# Patient Record
Sex: MALE | Race: WHITE | NOT HISPANIC OR LATINO | Employment: UNEMPLOYED | URBAN - METROPOLITAN AREA
[De-identification: names, ages, dates, MRNs, and addresses within clinical notes are randomized per-mention and may not be internally consistent; named-entity substitution may affect disease eponyms.]

---

## 2019-11-04 ENCOUNTER — ANCILLARY PROCEDURE (OUTPATIENT)
Dept: GENERAL RADIOLOGY | Facility: CLINIC | Age: 24
End: 2019-11-04
Attending: FAMILY MEDICINE
Payer: COMMERCIAL

## 2019-11-04 ENCOUNTER — OFFICE VISIT (OUTPATIENT)
Dept: ORTHOPEDICS | Facility: CLINIC | Age: 24
End: 2019-11-04
Payer: COMMERCIAL

## 2019-11-04 VITALS — BODY MASS INDEX: 33.33 KG/M2 | HEIGHT: 69 IN | WEIGHT: 225 LBS

## 2019-11-04 DIAGNOSIS — M79.645 THUMB PAIN, LEFT: Primary | ICD-10-CM

## 2019-11-04 DIAGNOSIS — M79.645 THUMB PAIN, LEFT: ICD-10-CM

## 2019-11-04 RX ORDER — METHYLPHENIDATE HYDROCHLORIDE 54 MG/1
72 TABLET ORAL
COMMUNITY
End: 2022-01-05

## 2019-11-04 ASSESSMENT — MIFFLIN-ST. JEOR: SCORE: 2005.97

## 2019-11-04 NOTE — PROGRESS NOTES
SPORTS & ORTHOPEDIC WALK-IN VISIT 11/4/2019    Primary Care Physician: Dr. Dunn      Here today for left MCP pain.  10 years ago he fell while snowboarding and feels like he dislocated the thumb at the MCP.  Does not recall any significant swelling or bruising at that time.  He did have some soreness for a few weeks afterwards but has had no problems since that time.  However a few weeks ago he felt a shifting in the MCP while leaning back on his left hand.  Since that time he has had some very mild soreness in the MCP of the left thumb.  However he is more bothered by the feeling of instability in the joint.  He does not feel like he can put full pressure on the thumb and has some apprehension with grasping.  He has not had any swelling or bruising.  He has not tried any bracing, home exercise or OTC medications.      Reason for visit:     What part of your body is injured / painful?  left hand    What caused the injury /pain? Unsure    How long ago did your injury occur or pain begin? several years ago    What are your most bothersome symptoms? Feels like it could dislocate again    How would you characterize your symptom?      What makes your symptoms better? Nothing    What makes your symptoms worse? Other: Gripping heavy objects    Have you been previously seen for this problem? No    Medical History:    Any recent changes to your medical history? No    Any new medication prescribed since last visit? No    Have you had surgery on this body part before? No    Social History:    Occupation: Student    Handedness: Right    Exercise: 4-5 days/week    Review of Systems:    Do you have fever, chills, weight loss? No    Do you have any vision problems? No    Do you have any chest pain or edema? No    Do you have any shortness of breath or wheezing?  No    Do you have stomach problems? No    Do you have any numbness or focal weakness? No    Do you have diabetes? No    Do you have problems with bleeding or  "clotting? No    Do you have an rashes or other skin lesions? No         Past Medical History, Current Medications, and Allergies are reviewed in the electronic medical record as appropriate.       EXAM:Ht 1.753 m (5' 9\")   Wt 102.1 kg (225 lb)   BMI 33.23 kg/m    General  - alert, pleasant, no distress  CV  - normal radial pulse, cap refill brisk  Musculoskeletal - left thumb  - inspection: no atrophy, normal joint alignment, no swelling  - palpation: no CMC tenderness, no soft tissue tenderness, no tenderness at the anatomical snuffbox  - ROM:  MCP 70 deg flexion   0 deg extension   IP 90 deg flexion   0 deg extension  - strength: 5/5  strength, 5/5 wrist abduction, 5/5 flexion, extension, adduction  - special tests:  Reports some discomfort with UCL stress but no laxity is noted.  Neuro  - no numbness, no motor deficit, grossly normal coordination, normal muscle tone  Skin  - no ecchymosis, erythema, warmth, or induration, no obvious rash      Imaging: X-rays of the left, performed and reviewed independently demonstrating no acute fracture dislocation.  No significant degenerative disease.  See EMR for formal radiology report.      Assessment: Patient is a 24 year old male with left thumb MCP sprain.  No significant structural injury suspected acutely, though possibly has some residual instability because of injury 10 years ago.  Nonetheless he has had relatively few symptoms for the last 10 years until recently.    Recommendations:   Reviewed imaging and assessment the patient detail  I recommended splinting from hand therapy as well as hand therapy visits if he desires.  Have given home exercises.  If he continues to have recurrent episodes of MCP pain or instability could consider MRI.    Sanchez Barry MD                "

## 2020-03-11 ENCOUNTER — TELEPHONE (OUTPATIENT)
Dept: INTERNAL MEDICINE | Facility: CLINIC | Age: 25
End: 2020-03-11

## 2020-03-11 DIAGNOSIS — Z00.00 ROUTINE HISTORY AND PHYSICAL EXAMINATION OF ADULT: Primary | ICD-10-CM

## 2020-03-11 NOTE — TELEPHONE ENCOUNTER
[facility] Call Center    Phone Message    May a detailed message be left on voicemail: yes     Reason for Call: Other:   Pt says that his whole family sees Too and pt does have an appt with Too in June but is wondering if he can come in to be seen sooner than that before he leaves the country for the next couple of years. Pt will be leaving sometime between May 10-20th.     Please call pt back if able to accomodate. Pt will continue to call in to check for cancellations.     Action Taken: Other:  pcc    Travel Screening: Not Applicable   Pt called and informed that due to the coronavirus Dr appointments are not being made. Encourage pt to call 1st part of April and May for updates.  Lucy Perera RN 11:36 AM on 3/17/2020.

## 2020-06-16 ENCOUNTER — OFFICE VISIT (OUTPATIENT)
Dept: INTERNAL MEDICINE | Facility: CLINIC | Age: 25
End: 2020-06-16
Payer: COMMERCIAL

## 2020-06-16 VITALS
HEART RATE: 70 BPM | SYSTOLIC BLOOD PRESSURE: 117 MMHG | WEIGHT: 243.1 LBS | DIASTOLIC BLOOD PRESSURE: 76 MMHG | BODY MASS INDEX: 35.9 KG/M2 | OXYGEN SATURATION: 98 %

## 2020-06-16 DIAGNOSIS — R23.9 SKIN CHANGE: Primary | ICD-10-CM

## 2020-06-16 DIAGNOSIS — Z11.3 SCREEN FOR STD (SEXUALLY TRANSMITTED DISEASE): ICD-10-CM

## 2020-06-16 DIAGNOSIS — R23.9 SKIN CHANGE: ICD-10-CM

## 2020-06-16 LAB
ALBUMIN SERPL-MCNC: 4.1 G/DL (ref 3.4–5)
ALP SERPL-CCNC: 81 U/L (ref 40–150)
ALT SERPL W P-5'-P-CCNC: 113 U/L (ref 0–70)
ANION GAP SERPL CALCULATED.3IONS-SCNC: 4 MMOL/L (ref 3–14)
AST SERPL W P-5'-P-CCNC: 43 U/L (ref 0–45)
BASOPHILS # BLD AUTO: 0.1 10E9/L (ref 0–0.2)
BASOPHILS NFR BLD AUTO: 0.8 %
BILIRUB SERPL-MCNC: 0.5 MG/DL (ref 0.2–1.3)
BUN SERPL-MCNC: 16 MG/DL (ref 7–30)
CALCIUM SERPL-MCNC: 8.8 MG/DL (ref 8.5–10.1)
CHLORIDE SERPL-SCNC: 110 MMOL/L (ref 94–109)
CHOLEST SERPL-MCNC: 190 MG/DL
CO2 SERPL-SCNC: 28 MMOL/L (ref 20–32)
CREAT SERPL-MCNC: 1.04 MG/DL (ref 0.66–1.25)
DIFFERENTIAL METHOD BLD: NORMAL
EOSINOPHIL # BLD AUTO: 0.4 10E9/L (ref 0–0.7)
EOSINOPHIL NFR BLD AUTO: 5.6 %
ERYTHROCYTE [DISTWIDTH] IN BLOOD BY AUTOMATED COUNT: 12.7 % (ref 10–15)
GFR SERPL CREATININE-BSD FRML MDRD: >90 ML/MIN/{1.73_M2}
GLUCOSE SERPL-MCNC: 99 MG/DL (ref 70–99)
HCT VFR BLD AUTO: 47.5 % (ref 40–53)
HDLC SERPL-MCNC: 52 MG/DL
HGB BLD-MCNC: 16 G/DL (ref 13.3–17.7)
IMM GRANULOCYTES # BLD: 0 10E9/L (ref 0–0.4)
IMM GRANULOCYTES NFR BLD: 0.6 %
LDLC SERPL CALC-MCNC: 125 MG/DL
LYMPHOCYTES # BLD AUTO: 2.8 10E9/L (ref 0.8–5.3)
LYMPHOCYTES NFR BLD AUTO: 43.2 %
MCH RBC QN AUTO: 29.5 PG (ref 26.5–33)
MCHC RBC AUTO-ENTMCNC: 33.7 G/DL (ref 31.5–36.5)
MCV RBC AUTO: 88 FL (ref 78–100)
MONOCYTES # BLD AUTO: 0.5 10E9/L (ref 0–1.3)
MONOCYTES NFR BLD AUTO: 7 %
NEUTROPHILS # BLD AUTO: 2.8 10E9/L (ref 1.6–8.3)
NEUTROPHILS NFR BLD AUTO: 42.8 %
NONHDLC SERPL-MCNC: 138 MG/DL
NRBC # BLD AUTO: 0 10*3/UL
NRBC BLD AUTO-RTO: 0 /100
PLATELET # BLD AUTO: 236 10E9/L (ref 150–450)
POTASSIUM SERPL-SCNC: 4.3 MMOL/L (ref 3.4–5.3)
PROT SERPL-MCNC: 7.1 G/DL (ref 6.8–8.8)
RBC # BLD AUTO: 5.43 10E12/L (ref 4.4–5.9)
SODIUM SERPL-SCNC: 142 MMOL/L (ref 133–144)
TRIGL SERPL-MCNC: 62 MG/DL
WBC # BLD AUTO: 6.6 10E9/L (ref 4–11)

## 2020-06-16 RX ORDER — ALBUTEROL SULFATE 90 UG/1
AEROSOL, METERED RESPIRATORY (INHALATION)
COMMUNITY
Start: 2020-03-17 | End: 2020-08-03

## 2020-06-16 RX ORDER — ALBUTEROL SULFATE 90 UG/1
1-2 AEROSOL, METERED RESPIRATORY (INHALATION)
COMMUNITY
Start: 2018-10-05 | End: 2022-10-04

## 2020-06-16 RX ORDER — DEXAMETHASONE 4 MG/1
TABLET ORAL
COMMUNITY
Start: 2020-03-17 | End: 2022-10-04

## 2020-06-16 ASSESSMENT — PAIN SCALES - GENERAL: PAINLEVEL: NO PAIN (0)

## 2020-06-16 NOTE — LETTER
Patient:  Miguel Angel Hernandez  :   1995  MRN:     [id number]        Mr. Miguel Angel Hernandez  [address] [address]        2020    Dear Mr. Hernandez,    Thank you for choosing the [facility] for your healthcare needs.  We appreciate the opportunity to serve you.    The following are your recent test results.     Dear Miguel Angel;     This testing is negative     GENE Gage     Resulted Orders   N. gonorrhea PCR - Urine, Clinic Collect   Result Value Ref Range    Specimen Descrip Urine     N Gonorrhea PCR Negative NEG^Negative      Comment:      Negative for N. gonorrhoeae rRNA by transcription mediated amplification.  A negative result by transcription mediated amplification does not preclude   the presence of N. gonorrhoeae infection because results are dependent on   proper and adequate collection, absence of inhibitors, and sufficient rRNA to   be detected.         ***    Please contact your provider if you have any questions or concerns.  We look forward to serving your needs in the future.      Sincerely,        Primary Care Center Staff

## 2020-06-16 NOTE — NURSING NOTE
Chief Complaint   Patient presents with     Establish Care     Pt would like to establish care today      JUAN Rizo at 11:09 AM sign on 6/16/2020

## 2020-06-16 NOTE — PROGRESS NOTES
HPI:    Pt. Comes into establish care. He does not abuse EtOH. He does not use tobacco but smokes marijuana. Family h/o for HTN. He may have had slightly elevated glucose in the past. He has some skin lesions on his waist line for several months. He tries to exercise but has gained some wt. Recently. He has been on Concerta for many years and sees an outside provider for this. He uses PRN inhalers but does not have any recent acute pulmonary sxs. He states he is probably up to date on immunizations but does not have information in front of him.  Otherwise, no other HEENT, cardiopulmonary, abdominal, , neurological, systemic, psychiatric, lymphatic, vascular endocrine complaints. No hernias or testicular masses.     PE:    Vitals noted, gen, nad, cooperative alert, HEENT, oropharynx clear no exudate, he came in wearing a mask, neck supple, nl rom, lungs with good air  Movement, RRR, S1, S2, no MRG, abdomen, no acute findings. He has follicular like skin findings on his waist line more on L than R. Grossly normal neurological exam    A/P:    1. ADHD; he remain on Concerta and followed by another provider  2. No real sxs. Of RAD but if worse could consider formal PFT's and will continue to renew MDI's  3. Will check fasting glucose and lipids  4. He would like to be screened for STD's but considers his risk low  5. Referred to dermatology for skin examination  6. He will check to see his immunization record. If he needs update he can make a nurse visit    Total time spent 30  minutes.  More than 50% of the time spent with Mr. Hernandez on counseling / coordinating his care

## 2020-06-16 NOTE — LETTER
6/16/2020      RE: Miguel Angel Hernandez  [address] [address]       HPI:    Pt. Comes into establish care. He does not abuse EtOH. He does not use tobacco but smokes marijuana. Family h/o for HTN. He may have had slightly elevated glucose in the past. He has some skin lesions on his waist line for several months. He tries to exercise but has gained some wt. Recently. He has been on Concerta for many years and sees an outside provider for this. He uses PRN inhalers but does not have any recent acute pulmonary sxs. He states he is probably up to date on immunizations but does not have information in front of him.  Otherwise, no other HEENT, cardiopulmonary, abdominal, , neurological, systemic, psychiatric, lymphatic, vascular endocrine complaints. No hernias or testicular masses.     PE:    Vitals noted, gen, nad, cooperative alert, HEENT, oropharynx clear no exudate, he came in wearing a mask, neck supple, nl rom, lungs with good air  Movement, RRR, S1, S2, no MRG, abdomen, no acute findings. He has follicular like skin findings on his waist line more on L than R. Grossly normal neurological exam    A/P:    1. ADHD; he remain on Concerta and followed by another provider  2. No real sxs. Of RAD but if worse could consider formal PFT's and will continue to renew MDI's  3. Will check fasting glucose and lipids  4. He would like to be screened for STD's but considers his risk low  5. Referred to dermatology for skin examination  6. He will check to see his immunization record. If he needs update he can make a nurse visit    Total time spent 30  minutes.  More than 50% of the time spent with Mr. Hernandez on counseling / coordinating his care          Layo Gage MD

## 2020-06-17 LAB
C TRACH DNA SPEC QL NAA+PROBE: NEGATIVE
HCV AB SERPL QL IA: NONREACTIVE
HIV 1+2 AB+HIV1 P24 AG SERPL QL IA: NONREACTIVE
N GONORRHOEA DNA SPEC QL NAA+PROBE: NEGATIVE
SPECIMEN SOURCE: NORMAL
SPECIMEN SOURCE: NORMAL

## 2020-06-18 ENCOUNTER — TELEPHONE (OUTPATIENT)
Dept: INTERNAL MEDICINE | Facility: CLINIC | Age: 25
End: 2020-06-18

## 2020-06-18 DIAGNOSIS — K76.9 DISORDER OF LIVER: Primary | ICD-10-CM

## 2020-06-18 NOTE — TELEPHONE ENCOUNTER
"Placed future lab orders this encounter      Dear Miguel Angel;    Your laboratory tests are mostly normal. One of the \"liver tests\" is slightly elevated and I recommend we just repeat the test in about one month. I placed a future laboratory order for this and you can call [phone] to schedule this appointment.    GENE Gage MD    "

## 2020-06-26 ENCOUNTER — VIRTUAL VISIT (OUTPATIENT)
Dept: INTERNAL MEDICINE | Facility: CLINIC | Age: 25
End: 2020-06-26
Payer: COMMERCIAL

## 2020-06-26 DIAGNOSIS — N52.8 OTHER MALE ERECTILE DYSFUNCTION: Primary | ICD-10-CM

## 2020-06-26 NOTE — NURSING NOTE
Chief Complaint   Patient presents with     Recheck Medication     discuss medications. Concerta Kimberly Nissen, EMT at 12:34 PM on 6/26/2020

## 2020-06-26 NOTE — PROGRESS NOTES
Telephone visit    Mr. Hernandez agrees to a telephone visit. Last in person visit with me was 6/16/2020. He had laboratory testing on that day and slight elevation in ALT (113); no other abnormalities. Will repeat liver tests and if abnormal further lab and imaging testing. He is not abusing EtOH or other drugs. He has some more recent erectile dysfunction. He has been on methylphenidate for about 12 years. Will review medication with [facility] pharmacy and have him evaluated by Dr. De Oliveira, Urology    GENE Gage MD    Total time more than 5 mintues

## 2020-06-27 ENCOUNTER — TELEPHONE (OUTPATIENT)
Dept: INTERNAL MEDICINE | Facility: CLINIC | Age: 25
End: 2020-06-27

## 2020-06-27 DIAGNOSIS — N52.8 OTHER MALE ERECTILE DYSFUNCTION: Primary | ICD-10-CM

## 2020-06-27 NOTE — TELEPHONE ENCOUNTER
Dear Lucy;    I had telephone visit with Mr. Hernandez yesterday and he needs Urology referral to Dr. De Oliveira. Order placed this encounter    ThanksGENE    Referral to Urology for scheduling.  Lucy Perera RN 11:28 AM on 6/29/2020.

## 2020-06-29 ENCOUNTER — TELEPHONE (OUTPATIENT)
Dept: INTERNAL MEDICINE | Facility: CLINIC | Age: 25
End: 2020-06-29

## 2020-06-30 ENCOUNTER — NURSE TRIAGE (OUTPATIENT)
Dept: NURSING | Facility: CLINIC | Age: 25
End: 2020-06-30

## 2020-06-30 ENCOUNTER — TELEPHONE (OUTPATIENT)
Dept: INTERNAL MEDICINE | Facility: CLINIC | Age: 25
End: 2020-06-30

## 2020-06-30 NOTE — TELEPHONE ENCOUNTER
"Patient has been trying to leave a voice mail for Dr. Layo Gage.  Offered to leave a routed message for provider to call patient back, but patient declined and said he would \"try a different number.\"    Routed to [facility] primary care [facility]    Jennifer Ramirez RN  [facility]      "

## 2020-06-30 NOTE — TELEPHONE ENCOUNTER
"Patient has been trying to leave a voice mail for Dr. Layo Gage.  Offered to leave a routed message for provider to call patient back, but patient declined and said he would \"try a different number.\"    Routed to [facility] primary care [facility]    Jennifer Ramirez RN  [facility]        "

## 2020-07-07 NOTE — TELEPHONE ENCOUNTER
MEDICAL RECORDS REQUEST   [facility]  Urology Clinic  [address] [place] [address]  PHONE: [phone]  Fax: [phone]        FUTURE VISIT INFORMATION                                                   Miguel Angel Hernandez, : 1995 scheduled for future visit at [facility]    APPOINTMENT INFORMATION:    Date: 20 1:15PM    Provider:  Celestino De Oliveira MD    Reason for Visit/Diagnosis: ED Issues     REFERRAL INFORMATION:    Referring provider:  LAUREN GUO    Specialty: MD    Referring providers clinic:  [facility]    Clinic contact number:  [phone]    RECORDS REQUESTED FOR VISIT                                                     NOTES  STATUS/DETAILS   OFFICE NOTE from referring provider  yes   OFFICE NOTE from other specialist  no   DISCHARGE SUMMARY from hospital  no   DISCHARGE REPORT from the ER  no   OPERATIVE REPORT  no   MEDICATION LIST  no     PRE-VISIT CHECKLIST      Record collection complete Yes   Appointment appropriately scheduled           (right time/right provider) Yes   MyChart activation Yes   Questionnaire complete If no, please explain: In process      Completed by: Taylor Savage

## 2020-07-08 ENCOUNTER — PRE VISIT (OUTPATIENT)
Dept: UROLOGY | Facility: CLINIC | Age: 25
End: 2020-07-08

## 2020-07-08 NOTE — TELEPHONE ENCOUNTER
Reason for Visit: Consult    Diagnosis: Erectile Dysfunction    Orders/Procedures/Records: in system    Contact Patient: n/a    Rooming Requirements: Dank Cage LPN  07/08/20  1:18 PM

## 2020-07-16 ENCOUNTER — VIRTUAL VISIT (OUTPATIENT)
Dept: UROLOGY | Facility: CLINIC | Age: 25
End: 2020-07-16
Payer: COMMERCIAL

## 2020-07-16 ENCOUNTER — PRE VISIT (OUTPATIENT)
Dept: UROLOGY | Facility: CLINIC | Age: 25
End: 2020-07-16

## 2020-07-16 DIAGNOSIS — N52.9 ERECTILE DYSFUNCTION, UNSPECIFIED ERECTILE DYSFUNCTION TYPE: Primary | ICD-10-CM

## 2020-07-16 RX ORDER — SILDENAFIL CITRATE 20 MG/1
20-40 TABLET ORAL DAILY PRN
Qty: 30 TABLET | Refills: 12 | Status: SHIPPED | OUTPATIENT
Start: 2020-07-16 | End: 2022-06-20

## 2020-07-16 NOTE — NURSING NOTE
"Miguel Angel Hernandez has given verbal permission for a phone visit 07/16/20 1:08 PM and would like to be reached at # [phone]     Called the pt 07/16/20, and 1:08 PM and reviewed their medications, history, and allergies. I then asked that they be in a quiet location with minimal distractions so they can hear the provider well.    Chief Complaint   Patient presents with     Consult For     ED       There were no vitals taken for this visit. There is no height or weight on file to calculate BMI.    There is no problem list on file for this patient.      Allergies   Allergen Reactions     Seasonal Allergies        Current Outpatient Medications   Medication Sig Dispense Refill     albuterol (PROAIR HFA/PROVENTIL HFA/VENTOLIN HFA) 108 (90 Base) MCG/ACT inhaler Inhale 1-2 puffs into the lungs       FLOVENT  MCG/ACT inhaler INHALE 2 PUFFS TWICE A DAY FOR TWO WEEKS AT ONSET OF URI. RINSE AFTER USE.       methylphenidate (CONCERTA) 54 MG CR tablet Take 72 mg by mouth       VENTOLIN  (90 Base) MCG/ACT inhaler INL 1 TO 2 PFS VIA SPACER Q 4 TO 6 H PRN         Social History     Tobacco Use     Smoking status: Current Some Day Smoker     Types: Other     Smokeless tobacco: Never Used     Tobacco comment: \"recreational smoker--marijuana\"    Substance Use Topics     Alcohol use: Not on file     Drug use: Not on file       Vaslie Lloyd, EMT,  7/16/2020  1:08 PM      "

## 2020-07-16 NOTE — LETTER
"7/16/2020       RE: Miguel Angel Hernandez  [address] [address]     Dear Colleague,    Thank you for referring your patient, Miguel Angel Hernandez, to the [facility] UROLOGY AND INST FOR PROSTATE AND UROLOGIC CANCERS at [facility]. Please see a copy of my visit note below.    Miguel Angel Hernandez is a 24 year old male who is being evaluated via a billable telephone visit.      The patient has been notified of following:     \"This telephone visit will be conducted via a call between you and your physician/provider. We have found that certain health care needs can be provided without the need for a physical exam.  This service lets us provide the care you need with a short phone conversation.  If a prescription is necessary we can send it directly to your pharmacy.  If lab work is needed we can place an order for that and you can then stop by our lab to have the test done at a later time.    Telephone visits are billed at different rates depending on your insurance coverage. During this emergency period, for some insurers they may be billed the same as an in-person visit.  Please reach out to your insurance provider with any questions.    If during the course of the call the physician/provider feels a telephone visit is not appropriate, you will not be charged for this service.\"    Patient has given verbal consent for Telephone visit?  Yes    What phone number would you like to be contacted at? [phone]    How would you like to obtain your AVS? Melania      I am seeing Miguel Angel Hernandez in consultation from Dr. Gage for evaluation of erectile dysfunction.    HPI:  Miguel Angel Hernandez is a 24 year old male with history of ED.  He's noticed issues maintaining erections with intercourse.  This has been last year or so. He's wondering if this is side effect from ADHD medications.  ED is distressing for him.      ED/Vascular disease risk factors:  HTN:   No  Hyperlipidemia: " "no  Smoking: no   DM: no  Cardiovascular disease: None  known  Meds associated with ED that he's taking: maybe ADHD medication.  Anxiety/anger/depression:  No    Occasionally marijuana- advised he abstain.      REVIEW OF SYSTEMS:  General: negative  Skin: negative  Eyes: negative  Ears/Nose/Throat: negative  Respiratory: negative  Cardiovascular: negative  Gastrointestinal: negative  Genitourinary: see HPI  Musculoskeletal: negative  Neurologic: negative  Psychiatric: negative  Hematologic/Lymphatic/Immunologic: negative  Endocrine: negative    PAST MEDICAL HX:  ADHD    PAST SURG HX:  No history of surgery, other than tonsils out.    FAMILY HX:  DM  HTN      SOCIAL HX:  Social History     Tobacco Use     Smoking status: Former Smoker     Types: Other     Smokeless tobacco: Never Used     Tobacco comment: \"recreational smoker--marijuana\"    Substance Use Topics     Alcohol use: None     Drug use: None       MEDICATIONS:  Current Outpatient Medications   Medication Sig     albuterol (PROAIR HFA/PROVENTIL HFA/VENTOLIN HFA) 108 (90 Base) MCG/ACT inhaler Inhale 1-2 puffs into the lungs     FLOVENT  MCG/ACT inhaler INHALE 2 PUFFS TWICE A DAY FOR TWO WEEKS AT ONSET OF URI. RINSE AFTER USE.     methylphenidate (CONCERTA) 54 MG CR tablet Take 72 mg by mouth     VENTOLIN  (90 Base) MCG/ACT inhaler INL 1 TO 2 PFS VIA SPACER Q 4 TO 6 H PRN     No current facility-administered medications for this visit.        ALLERGIES:  Seasonal allergies      GENERAL PHYSICAL EXAM:     Exam deferred over the phone.  Attitude and demeanor is pleasant, alert & oriented and conversant.  No gross thought process defects during casual conversation.     Imaging/labs:  Lab Results   Component Value Date    CR 1.04 06/16/2020       ASSESSMENT:     Trouble to maintain erections.    PLAN:    Sildenafil prescription, 20mg dose.  Discussed side effects and how to take.    Future labs for testosterone, E2, prolactin., he has an upcomming " blood draw and will add on to that draw.    PRN follow-up with urology. I'll contact him on MyChart with lab results.    Celestino De Oliveira MD     Urological Surgeon      16min phone call, ending 1:40 PM

## 2020-07-16 NOTE — PROGRESS NOTES
"Miguel Angel Hernandez is a 24 year old male who is being evaluated via a billable telephone visit.      The patient has been notified of following:     \"This telephone visit will be conducted via a call between you and your physician/provider. We have found that certain health care needs can be provided without the need for a physical exam.  This service lets us provide the care you need with a short phone conversation.  If a prescription is necessary we can send it directly to your pharmacy.  If lab work is needed we can place an order for that and you can then stop by our lab to have the test done at a later time.    Telephone visits are billed at different rates depending on your insurance coverage. During this emergency period, for some insurers they may be billed the same as an in-person visit.  Please reach out to your insurance provider with any questions.    If during the course of the call the physician/provider feels a telephone visit is not appropriate, you will not be charged for this service.\"    Patient has given verbal consent for Telephone visit?  Yes    What phone number would you like to be contacted at? [phone]    How would you like to obtain your AVS? Melania      I am seeing Miguel Angel Hernandez in consultation from Dr. Gage for evaluation of erectile dysfunction.    HPI:  Miguel Angel Hernandez is a 24 year old male with history of ED.  He's noticed issues maintaining erections with intercourse.  This has been last year or so. He's wondering if this is side effect from ADHD medications.  ED is distressing for him.      ED/Vascular disease risk factors:  HTN:   No  Hyperlipidemia: no  Smoking: no   DM: no  Cardiovascular disease: None  known  Meds associated with ED that he's taking: maybe ADHD medication.  Anxiety/anger/depression:  No    Occasionally marijuana- advised he abstain.      REVIEW OF SYSTEMS:  General: negative  Skin: negative  Eyes: negative  Ears/Nose/Throat: negative  Respiratory: " "negative  Cardiovascular: negative  Gastrointestinal: negative  Genitourinary: see HPI  Musculoskeletal: negative  Neurologic: negative  Psychiatric: negative  Hematologic/Lymphatic/Immunologic: negative  Endocrine: negative    PAST MEDICAL HX:  ADHD    PAST SURG HX:  No history of surgery, other than tonsils out.    FAMILY HX:  DM  HTN      SOCIAL HX:  Social History     Tobacco Use     Smoking status: Former Smoker     Types: Other     Smokeless tobacco: Never Used     Tobacco comment: \"recreational smoker--marijuana\"    Substance Use Topics     Alcohol use: None     Drug use: None       MEDICATIONS:  Current Outpatient Medications   Medication Sig     albuterol (PROAIR HFA/PROVENTIL HFA/VENTOLIN HFA) 108 (90 Base) MCG/ACT inhaler Inhale 1-2 puffs into the lungs     FLOVENT  MCG/ACT inhaler INHALE 2 PUFFS TWICE A DAY FOR TWO WEEKS AT ONSET OF URI. RINSE AFTER USE.     methylphenidate (CONCERTA) 54 MG CR tablet Take 72 mg by mouth     VENTOLIN  (90 Base) MCG/ACT inhaler INL 1 TO 2 PFS VIA SPACER Q 4 TO 6 H PRN     No current facility-administered medications for this visit.        ALLERGIES:  Seasonal allergies      GENERAL PHYSICAL EXAM:     Exam deferred over the phone.  Attitude and demeanor is pleasant, alert & oriented and conversant.  No gross thought process defects during casual conversation.     Imaging/labs:  Lab Results   Component Value Date    CR 1.04 06/16/2020       ASSESSMENT:     Trouble to maintain erections.    PLAN:    Sildenafil prescription, 20mg dose.  Discussed side effects and how to take.    Future labs for testosterone, E2, prolactin., he has an upcomming blood draw and will add on to that draw.    PRN follow-up with urology. I'll contact him on MyChart with lab results.      Celestino De Oliveira MD     Urological Surgeon      16min phone call, ending 1:40 PM   "

## 2020-07-16 NOTE — PATIENT INSTRUCTIONS
Prescription has been sent to your pharmacy.    Schedule lab appointment.  Dr. De Oliveira will notify you of the results.    It was a pleasure meeting with you today.  Thank you for allowing me and my team the privilege of caring for you today.  YOU are the reason we are here, and I truly hope we provided you with the excellent service you deserve.  Please let us know if there is anything else we can do for you so that we can be sure you are leaving completely satisfied with your care experience.        Laura Washington, CMA

## 2020-07-21 DIAGNOSIS — K76.9 DISORDER OF LIVER: ICD-10-CM

## 2020-07-21 DIAGNOSIS — N52.9 ERECTILE DYSFUNCTION, UNSPECIFIED ERECTILE DYSFUNCTION TYPE: ICD-10-CM

## 2020-07-21 LAB
ALBUMIN SERPL-MCNC: 4.1 G/DL (ref 3.4–5)
ALP SERPL-CCNC: 84 U/L (ref 40–150)
ALT SERPL W P-5'-P-CCNC: 160 U/L (ref 0–70)
AST SERPL W P-5'-P-CCNC: 88 U/L (ref 0–45)
BILIRUB DIRECT SERPL-MCNC: 0.1 MG/DL (ref 0–0.2)
BILIRUB SERPL-MCNC: 0.4 MG/DL (ref 0.2–1.3)
PROLACTIN SERPL-MCNC: 7 UG/L (ref 2–18)
PROT SERPL-MCNC: 7.4 G/DL (ref 6.8–8.8)

## 2020-07-22 ENCOUNTER — TELEPHONE (OUTPATIENT)
Dept: UROLOGY | Facility: CLINIC | Age: 25
End: 2020-07-22

## 2020-07-23 ENCOUNTER — TELEPHONE (OUTPATIENT)
Dept: INTERNAL MEDICINE | Facility: CLINIC | Age: 25
End: 2020-07-23

## 2020-07-23 DIAGNOSIS — R74.8 ELEVATED LIVER ENZYMES: Primary | ICD-10-CM

## 2020-07-23 LAB
SHBG SERPL-SCNC: 36 NMOL/L (ref 11–80)
TESTOST FREE SERPL-MCNC: 11.96 NG/DL (ref 4.7–24.4)
TESTOST SERPL-MCNC: 585 NG/DL (ref 240–950)

## 2020-07-23 NOTE — TELEPHONE ENCOUNTER
Dear Lucy;    Please see results note I sent to Miguel Angel and help coordinate future labs, abdominal U/S, and hepatic GI appt. (all ordered this encounter)    Thanks, GENE Gage    Dear Miguel Angel;    Your liver tests are still abnormal and I recommend we perform some additional testing. I placed orders today for laboratory tests, an liver ultrasound, and a visit with the hepatologist.  My nurse Lucy will help coordinate this.    GENE Gage MD    Pt called and plan of care discussed. Clinic coordinators to assist in scheduling appts. Clinic numbers given for questions or concerns.  Lucy Perera RN 12:40 PM on 7/23/2020.

## 2020-07-27 DIAGNOSIS — R74.8 ELEVATED LIVER ENZYMES: ICD-10-CM

## 2020-07-27 LAB
HBV CORE AB SERPL QL IA: NONREACTIVE
HBV SURFACE AB SERPL IA-ACNC: 0.51 M[IU]/ML
HBV SURFACE AG SERPL QL IA: NONREACTIVE

## 2020-07-27 NOTE — TELEPHONE ENCOUNTER
RECORDS RECEIVED FROM: Elevated liver enzymes [R74.8]   Appt Date: 08.03.2020   NOTES STATUS DETAILS   OFFICE NOTE from referring provider Internal 07.23.2020 Consult Layo Gage MD [facility]   OFFICE NOTES from other specialists N/A    DISCHARGE SUMMARY from hospital N/A    MEDICATION LIST Internal    LIVER BIOSPY (IF APPLICABLE)      PATHOLOGY REPORTS  N/A    IMAGING     ENDOSCOPY (IF AVAILABLE) N/A    COLONOSCOPY (IF AVAILABLE) N/A    ULTRASOUND LIVER Scheduled  08.01.2020 US Abdomen Complete      CT OF ABDOMEN N/A    MRI OF LIVER N/A    FIBROSCAN, US ELASTOGRAPHY, FIBROSIS SCAN, MR ELASTOGRAPHY N/A    LABS     HEPATIC PANEL (LIVER PANEL) Internal 07.21.2020   BASIC METABOLIC PANEL N/A    COMPLETE METABOLIC PANEL Internal 06.16.2020   COMPLETE BLOOD COUNT (CBC) Internal 065.16.2020   INTERNATIONAL NORMALIZED RATIO (INR) N/A    HEPATITIS C ANTIBODY N/A    HEPATITIS C VIRAL LOAD/PCR N/A    HEPATITIS C GENOTYPE N/A    HEPATITIS B SURFACE ANTIGEN In process 07.27.2020   HEPATITIS B SURFACE ANTIBODY In process 07.27.2020   HEPATITIS B DNA QUANT LEVEL N/A    HEPATITIS B CORE ANTIBODY In process 07.27.2020

## 2020-07-28 LAB
ANA SER QL IF: NEGATIVE
ESTRADIOL SERPL HS-MCNC: 27 PG/ML (ref 10–40)
SMA IGG SER-ACNC: 3 UNITS (ref 0–19)

## 2020-08-01 ENCOUNTER — ANCILLARY PROCEDURE (OUTPATIENT)
Dept: ULTRASOUND IMAGING | Facility: CLINIC | Age: 25
End: 2020-08-01
Attending: INTERNAL MEDICINE
Payer: COMMERCIAL

## 2020-08-01 DIAGNOSIS — R74.8 ELEVATED LIVER ENZYMES: ICD-10-CM

## 2020-08-03 ENCOUNTER — VIRTUAL VISIT (OUTPATIENT)
Dept: GASTROENTEROLOGY | Facility: CLINIC | Age: 25
End: 2020-08-03
Attending: INTERNAL MEDICINE
Payer: COMMERCIAL

## 2020-08-03 ENCOUNTER — PRE VISIT (OUTPATIENT)
Dept: GASTROENTEROLOGY | Facility: CLINIC | Age: 25
End: 2020-08-03

## 2020-08-03 DIAGNOSIS — R74.8 ELEVATED LIVER ENZYMES: ICD-10-CM

## 2020-08-03 ASSESSMENT — PAIN SCALES - GENERAL: PAINLEVEL: NO PAIN (0)

## 2020-08-03 NOTE — PATIENT INSTRUCTIONS
Plan  1.  Additional blood work- this can be done at your next visit  2.  Follow-up on liver ultrasound report- I will send you a [employer] message  3.  Weight loss with diet and exercise  4.  Follow-up in 6 months    Lisandro Cotto MD  Hepatology  [facility]

## 2020-08-03 NOTE — PROGRESS NOTES
Left voicemail for patient to call back to set up telemedicine visit, will call again before appointment time.  Cindy Rogers CMA on 8/3/2020 at 8:18 AM

## 2020-08-03 NOTE — LETTER
"    8/3/2020         RE: Miguel Angel Hernandez  [address] [address]        Dear Colleague,    Thank you for referring your patient, Miguel Angel Hernandez, to the [facility] HEPATOLOGY. Please see a copy of my visit note below.    Left voicemail for patient to call back to set up telemedicine visit, will call again before appointment time.  Cindy Rogers CMA on 8/3/2020 at 8:18 AM      Left voicemail for patient to call back to set up telemedicine visit, will call again before appointment time.  Cindy Rogers CMA on 8/3/2020 at 8:38 AM      Miguel Angel Hernandez is a 24 year old male who is being evaluated via a billable telephone visit.      The patient has been notified of following:     \"This telephone visit will be conducted via a call between you and your physician/provider. We have found that certain health care needs can be provided without the need for a physical exam.  This service lets us provide the care you need with a short phone conversation.  If a prescription is necessary we can send it directly to your pharmacy.  If lab work is needed we can place an order for that and you can then stop by our lab to have the test done at a later time.    Telephone visits are billed at different rates depending on your insurance coverage. During this emergency period, for some insurers they may be billed the same as an in-person visit.  Please reach out to your insurance provider with any questions.    If during the course of the call the physician/provider feels a telephone visit is not appropriate, you will not be charged for this service.\"    Patient has given verbal consent for Telephone visit?  Yes    What phone number would you like to be contacted at? [phone]    How would you like to obtain your AVS? Mail a copy    Phone call duration: 25 minutes  _______________________________________________________________________________    [facility]    Hepatology New Patient " Visit    Referring provider:  Layo Gage      24 year old male    Chief complaint:  Abnormal liver function tests     HPI:  The patient presents for further evaluation and management of abnormal liver function tests.  The patient recently transitioned from pediatric to adult primary care when routine liver function tests were checked and noted to be abnormal.  Testing for hepatitis B, C and autoimmune hepatitis returned negative.  Abdominal ultrasound was performed on Friday with no report back yet.      The patient is well today.  He denies any symptoms specific to liver disease.      The patient denies any jaundice, itch, lower extremity edema, abdominal distention, lethargy or confusion.      The patient denies melena, hematemesis or hematochezia.      The patient denies any fevers, sweats or chills.  No cough or dyspnea.      The patient denies any recent weight gain.  He is overweight- patient weighs 235lbs and is 5 feet 8 inches in height.  Appetite is normal.      The patient drinks alcohol once to twice per week and will drink 2-4 beers on these occasions.  He denies any history of alcohol abuse or DUIs.      The patient has never smoked.  He currently uses marijuana 2-3 times per week.  No history of intranasal or intravenous drug use.       Medical hx Surgical hx   Past Medical History:   Diagnosis Date     ADHD      Asthma      Dyslipidemia      Obesity       Past Surgical History:   Procedure Laterality Date     TONSILLECTOMY            Medications  Prior to Admission medications    Medication Sig Start Date End Date Taking? Authorizing Provider   albuterol (PROAIR HFA/PROVENTIL HFA/VENTOLIN HFA) 108 (90 Base) MCG/ACT inhaler Inhale 1-2 puffs into the lungs 10/5/18  Yes Reported, Patient   FLOVENT  MCG/ACT inhaler INHALE 2 PUFFS TWICE A DAY FOR TWO WEEKS AT ONSET OF URI. RINSE AFTER USE. 3/17/20  Yes Reported, Patient   methylphenidate (CONCERTA) 54 MG CR tablet Take 72 mg by mouth   Yes  "Reported, Patient   sildenafil (REVATIO) 20 MG tablet Take 1-2 tablets (20-40 mg) by mouth daily as needed (take 1 hour before intercourse as needed.) May increase up to 100mg dose ( 5 tablets) if needed.  Use lowest effective dose.  Patient not taking: Reported on 8/3/2020 7/16/20   Celestino De Oliveira MD       Allergies  Allergies   Allergen Reactions     Seasonal Allergies        Family hx Social hx   Family History   Problem Relation Age of Onset     Diabetes Maternal Grandmother      Liver Disease No family hx of      Colon Cancer No family hx of       Social History     Tobacco Use     Smoking status: Former Smoker     Types: Other     Smokeless tobacco: Never Used     Tobacco comment: \"recreational smoker--marijuana\"    Substance Use Topics     Alcohol use: None     Drug use: None     Lives in [place] with parents.  2 younger brothers, both healthy.  No children.  Works as analyst at hep desk.       Review of systems  A 10-point review of systems was negative.    Examination  N/A    Laboratory  Lab Results   Component Value Date     06/16/2020    POTASSIUM 4.3 06/16/2020    CHLORIDE 110 06/16/2020    CO2 28 06/16/2020    BUN 16 06/16/2020    CR 1.04 06/16/2020       Lab Results   Component Value Date    BILITOTAL 0.4 07/21/2020     07/21/2020    AST 88 07/21/2020    ALKPHOS 84 07/21/2020       Lab Results   Component Value Date    ALBUMIN 4.1 07/21/2020    PROTTOTAL 7.4 07/21/2020        Lab Results   Component Value Date    WBC 6.6 06/16/2020    HGB 16.0 06/16/2020    MCV 88 06/16/2020     06/16/2020       Radiology  Abd U/S 7/31/2020 (P)    Assessment  24-year-old male with a history of metabolic syndrome who presents for further evaluation and management of abnormal liver function tests most likely secondary to non-alcoholic fatty liver disease.  No biochemical evidence of cirrhosis.  Will rule out other etiologies of chronic liver disease including celiac disease and " hemochromatosis.  We discussed the natural history, complications and management of non-alcoholic fatty liver disease, which is primarily through weight loss via diet and exercise.     Plan  1.  Check TTG Ab, iron studies, HAV Ab  2.  Follow-up on pending abd U/S  3.  Weight loss with diet and exercise  4.  Follow-up in 6 months    Lisandro Cotto MD  Hepatology  [facility]

## 2020-08-03 NOTE — PROGRESS NOTES
"Miguel Angel Hernandez is a 24 year old male who is being evaluated via a billable telephone visit.      The patient has been notified of following:     \"This telephone visit will be conducted via a call between you and your physician/provider. We have found that certain health care needs can be provided without the need for a physical exam.  This service lets us provide the care you need with a short phone conversation.  If a prescription is necessary we can send it directly to your pharmacy.  If lab work is needed we can place an order for that and you can then stop by our lab to have the test done at a later time.    Telephone visits are billed at different rates depending on your insurance coverage. During this emergency period, for some insurers they may be billed the same as an in-person visit.  Please reach out to your insurance provider with any questions.    If during the course of the call the physician/provider feels a telephone visit is not appropriate, you will not be charged for this service.\"    Patient has given verbal consent for Telephone visit?  Yes    What phone number would you like to be contacted at? [phone]    How would you like to obtain your AVS? Mail a copy    Phone call duration: 25 minutes  _______________________________________________________________________________    [facility]    Hepatology New Patient Visit    Referring provider:  Layo Gage      24 year old male    Chief complaint:  Abnormal liver function tests     HPI:  The patient presents for further evaluation and management of abnormal liver function tests.  The patient recently transitioned from pediatric to adult primary care when routine liver function tests were checked and noted to be abnormal.  Testing for hepatitis B, C and autoimmune hepatitis returned negative.  Abdominal ultrasound was performed on Friday with no report back yet.      The patient is well today.  He denies any " symptoms specific to liver disease.      The patient denies any jaundice, itch, lower extremity edema, abdominal distention, lethargy or confusion.      The patient denies melena, hematemesis or hematochezia.      The patient denies any fevers, sweats or chills.  No cough or dyspnea.      The patient denies any recent weight gain.  He is overweight- patient weighs 235lbs and is 5 feet 8 inches in height.  Appetite is normal.      The patient drinks alcohol once to twice per week and will drink 2-4 beers on these occasions.  He denies any history of alcohol abuse or DUIs.      The patient has never smoked.  He currently uses marijuana 2-3 times per week.  No history of intranasal or intravenous drug use.       Medical hx Surgical hx   Past Medical History:   Diagnosis Date     ADHD      Asthma      Dyslipidemia      Obesity       Past Surgical History:   Procedure Laterality Date     TONSILLECTOMY            Medications  Prior to Admission medications    Medication Sig Start Date End Date Taking? Authorizing Provider   albuterol (PROAIR HFA/PROVENTIL HFA/VENTOLIN HFA) 108 (90 Base) MCG/ACT inhaler Inhale 1-2 puffs into the lungs 10/5/18  Yes Reported, Patient   FLOVENT  MCG/ACT inhaler INHALE 2 PUFFS TWICE A DAY FOR TWO WEEKS AT ONSET OF URI. RINSE AFTER USE. 3/17/20  Yes Reported, Patient   methylphenidate (CONCERTA) 54 MG CR tablet Take 72 mg by mouth   Yes Reported, Patient   sildenafil (REVATIO) 20 MG tablet Take 1-2 tablets (20-40 mg) by mouth daily as needed (take 1 hour before intercourse as needed.) May increase up to 100mg dose ( 5 tablets) if needed.  Use lowest effective dose.  Patient not taking: Reported on 8/3/2020 7/16/20   Celestino De Oliveira MD       Allergies  Allergies   Allergen Reactions     Seasonal Allergies        Family hx Social hx   Family History   Problem Relation Age of Onset     Diabetes Maternal Grandmother      Liver Disease No family hx of      Colon Cancer No family hx of   "     Social History     Tobacco Use     Smoking status: Former Smoker     Types: Other     Smokeless tobacco: Never Used     Tobacco comment: \"recreational smoker--marijuana\"    Substance Use Topics     Alcohol use: None     Drug use: None     Lives in [place] with parents.  2 younger brothers, both healthy.  No children.  Works as analyst at hep desk.       Review of systems  A 10-point review of systems was negative.    Examination  N/A    Laboratory  Lab Results   Component Value Date     06/16/2020    POTASSIUM 4.3 06/16/2020    CHLORIDE 110 06/16/2020    CO2 28 06/16/2020    BUN 16 06/16/2020    CR 1.04 06/16/2020       Lab Results   Component Value Date    BILITOTAL 0.4 07/21/2020     07/21/2020    AST 88 07/21/2020    ALKPHOS 84 07/21/2020       Lab Results   Component Value Date    ALBUMIN 4.1 07/21/2020    PROTTOTAL 7.4 07/21/2020        Lab Results   Component Value Date    WBC 6.6 06/16/2020    HGB 16.0 06/16/2020    MCV 88 06/16/2020     06/16/2020       Radiology  Abd U/S 7/31/2020 (P)    Assessment  24-year-old male with a history of metabolic syndrome who presents for further evaluation and management of abnormal liver function tests most likely secondary to non-alcoholic fatty liver disease.  No biochemical evidence of cirrhosis.  Will rule out other etiologies of chronic liver disease including celiac disease and hemochromatosis.  We discussed the natural history, complications and management of non-alcoholic fatty liver disease, which is primarily through weight loss via diet and exercise.     Plan  1.  Check TTG Ab, iron studies, HAV Ab  2.  Follow-up on pending abd U/S  3.  Weight loss with diet and exercise  4.  Follow-up in 6 months    Lisandro Cotto MD  Hepatology  [facility]      "

## 2020-08-03 NOTE — PROGRESS NOTES
Left voicemail for patient to call back to set up telemedicine visit, will call again before appointment time.  Cindy Rogers CMA on 8/3/2020 at 8:38 AM

## 2020-10-29 ENCOUNTER — OFFICE VISIT (OUTPATIENT)
Dept: UROLOGY | Facility: CLINIC | Age: 25
End: 2020-10-29
Payer: COMMERCIAL

## 2020-10-29 VITALS
BODY MASS INDEX: 34.07 KG/M2 | HEIGHT: 69 IN | SYSTOLIC BLOOD PRESSURE: 148 MMHG | WEIGHT: 230 LBS | DIASTOLIC BLOOD PRESSURE: 76 MMHG | HEART RATE: 92 BPM

## 2020-10-29 DIAGNOSIS — N50.812 PAIN IN LEFT TESTICLE: Primary | ICD-10-CM

## 2020-10-29 PROCEDURE — 99213 OFFICE O/P EST LOW 20 MIN: CPT | Performed by: UROLOGY

## 2020-10-29 ASSESSMENT — PAIN SCALES - GENERAL: PAINLEVEL: NO PAIN (0)

## 2020-10-29 ASSESSMENT — MIFFLIN-ST. JEOR: SCORE: 2023.65

## 2020-10-29 NOTE — PROGRESS NOTES
"Pt was using a constriction band and felt a pull in the left testis when removing the ring.  Feels a dull ache in the left groin. That he wanted to get checked out.  I saw him 7/2020 on virtual visit for ED.  Had recommended sildenafil at that time.    Component      Latest Ref Rng & Units 7/21/2020   Testosterone Total      240 - 950 ng/dL 585   Sex Hormone Binding Globulin      11 - 80 nmol/L 36   Free Testosterone Calculated      4.7 - 24.4 ng/dL 11.96   Estradiol Ultrasensitive      10 - 40 pg/mL 27   Prolactin      2 - 18 ug/L 7       BP (!) 148/76   Pulse 92   Ht 1.753 m (5' 9\")   Wt 104.3 kg (230 lb)   BMI 33.97 kg/m      General: Alert, oriented, nad  Eyes: anicteric, EOMI.  Pulse: regular  Resps: normal, non-labored.  Abdomen:  nondistended.   exam   Normal circ'ed phallus.  Testes ++, anodular, nontender.  Vas and epididymis present and normal bilaterally.  Cord structures not remarkable.  No inguinal hernias     A-  Normal genital exam.    Plan  PRN follow-up.  No tests or medication recommendations at this time.  Observe pain symptoms which seem to be improving.  Declines fertility testing at this time.    Sherly STEWARD      15min visit, over 50% face to face in counseling/discussion of above issues.       "

## 2020-10-29 NOTE — NURSING NOTE
"Chief Complaint   Patient presents with     Consult     testicular injury       Blood pressure (!) 148/76, pulse 92, height 1.753 m (5' 9\"), weight 104.3 kg (230 lb). Body mass index is 33.97 kg/m .    There is no problem list on file for this patient.      Allergies   Allergen Reactions     Seasonal Allergies        Current Outpatient Medications   Medication Sig Dispense Refill     albuterol (PROAIR HFA/PROVENTIL HFA/VENTOLIN HFA) 108 (90 Base) MCG/ACT inhaler Inhale 1-2 puffs into the lungs       FLOVENT  MCG/ACT inhaler INHALE 2 PUFFS TWICE A DAY FOR TWO WEEKS AT ONSET OF URI. RINSE AFTER USE.       methylphenidate (CONCERTA) 54 MG CR tablet Take 72 mg by mouth       sildenafil (REVATIO) 20 MG tablet Take 1-2 tablets (20-40 mg) by mouth daily as needed (take 1 hour before intercourse as needed.) May increase up to 100mg dose ( 5 tablets) if needed.  Use lowest effective dose. (Patient not taking: Reported on 8/3/2020) 30 tablet 12       Social History     Tobacco Use     Smoking status: Former Smoker     Types: Other     Smokeless tobacco: Never Used     Tobacco comment: \"recreational smoker--marijuana\"    Substance Use Topics     Alcohol use: None     Drug use: None       Tammy Cage LPN  10/29/2020  8:24 AM  "

## 2020-10-29 NOTE — LETTER
"10/29/2020       RE: Miguel Angel Hernandez  [address] [address]     Dear Colleague,    Thank you for referring your patient, Miguel Angel Hernandez, to the [facility] [place] at [facility]. Please see a copy of my visit note below.    Pt was using a constriction band and felt a pull in the left testis when removing the ring.  Feels a dull ache in the left groin. That he wanted to get checked out.  I saw him 7/2020 on virtual visit for ED.  Had recommended sildenafil at that time.    Component      Latest Ref Rng & Units 7/21/2020   Testosterone Total      240 - 950 ng/dL 585   Sex Hormone Binding Globulin      11 - 80 nmol/L 36   Free Testosterone Calculated      4.7 - 24.4 ng/dL 11.96   Estradiol Ultrasensitive      10 - 40 pg/mL 27   Prolactin      2 - 18 ug/L 7       BP (!) 148/76   Pulse 92   Ht 1.753 m (5' 9\")   Wt 104.3 kg (230 lb)   BMI 33.97 kg/m      General: Alert, oriented, nad  Eyes: anicteric, EOMI.  Pulse: regular  Resps: normal, non-labored.  Abdomen:  nondistended.   exam   Normal circ'ed phallus.  Testes ++, anodular, nontender.  Vas and epididymis present and normal bilaterally.  Cord structures not remarkable.  No inguinal hernias     A-  Normal genital exam.    Plan  PRN follow-up.  No tests or medication recommendations at this time.  Observe pain symptoms which seem to be improving.  Declines fertility testing at this time.    Sherly STEWARD      15min visit, over 50% face to face in counseling/discussion of above issues.     "

## 2020-11-29 ENCOUNTER — HEALTH MAINTENANCE LETTER (OUTPATIENT)
Age: 25
End: 2020-11-29

## 2021-04-26 ENCOUNTER — IMMUNIZATION (OUTPATIENT)
Dept: NURSING | Facility: CLINIC | Age: 26
End: 2021-04-26
Payer: COMMERCIAL

## 2021-04-26 PROCEDURE — 0001A PR COVID VAC PFIZER DIL RECON 30 MCG/0.3 ML IM: CPT

## 2021-04-26 PROCEDURE — 91300 PR COVID VAC PFIZER DIL RECON 30 MCG/0.3 ML IM: CPT

## 2021-05-17 ENCOUNTER — IMMUNIZATION (OUTPATIENT)
Dept: NURSING | Facility: CLINIC | Age: 26
End: 2021-05-17
Attending: INTERNAL MEDICINE
Payer: COMMERCIAL

## 2021-05-17 PROCEDURE — 0002A PR COVID VAC PFIZER DIL RECON 30 MCG/0.3 ML IM: CPT

## 2021-05-17 PROCEDURE — 91300 PR COVID VAC PFIZER DIL RECON 30 MCG/0.3 ML IM: CPT

## 2021-09-13 ENCOUNTER — TELEPHONE (OUTPATIENT)
Dept: INTERNAL MEDICINE | Facility: CLINIC | Age: 26
End: 2021-09-13

## 2021-09-13 NOTE — TELEPHONE ENCOUNTER
[facility] Call Center    Phone Message    May a detailed message be left on voicemail: yes     Reason for Call: Other: Patient is calling to see about getting a referral or suggestion on a new psychiatrist.      Action Taken: Message routed to:  [facility] OLIVIA    Travel Screening: Not Applicable                                                                       Follow Up:  right leg cellulitis    Inverval History/ROS: slight improvement in right lower leg pain    Allergies  No Known Allergies        ANTIMICROBIALS:  vancomycin  IVPB 1250 every 12 hours      OTHER MEDS:  metoprolol 50 milliGRAM(s) Oral two times a day  amLODIPine   Tablet 5 milliGRAM(s) Oral daily  heparin  Injectable 5000 Unit(s) SubCutaneous every 8 hours  acetaminophen   Tablet. 650 milliGRAM(s) Oral every 6 hours PRN  oxyCODONE    IR 5 milliGRAM(s) Oral every 6 hours PRN  furosemide    Tablet 20 milliGRAM(s) Oral daily  polyethylene glycol 3350 17 Gram(s) Oral daily  senna 2 Tablet(s) Oral at bedtime      Vital Signs Last 24 Hrs  T(C): 36.7 (11 Jul 2017 15:50), Max: 37.6 (10 Jul 2017 22:03)  T(F): 98.1 (11 Jul 2017 15:50), Max: 99.6 (10 Jul 2017 22:03)  HR: 71 (11 Jul 2017 15:50) (70 - 92)  BP: 108/56 (11 Jul 2017 15:50) (108/56 - 159/78)  BP(mean): --  RR: 18 (11 Jul 2017 15:50) (16 - 18)  SpO2: 97% (11 Jul 2017 15:50) (97% - 100%)    PHYSICAL EXAM:  General: [x ] non-toxic  HEAD/EYES: [ ] PERRL [x ] white sclera   ENT:  [ ] normal [x ] supple   Cardiovascular:   [ ] murmur [x ] normal   Respiratory:  [x ] clear to ausculation bilaterally  GI:  [x ] soft, non-tender, normal bowel sounds  :  [ ] lowe [x ] no CVA tenderness   Musculoskeletal:  right lower leg with tenderness around shin slight less warm around ankle hyperpigmentation both lower legs  Neurologic:  x[ ] non-focal exam   Skin:  [x ] no rash  Lymph: [ ] no lymphadenopathy  Psychiatric:  [ ] appropriate affect [ ] alert & oriented  Lines:  [x ] no phlebitis [ ] central line                                10.1   8.09  )-----------( 180      ( 10 Jul 2017 06:17 )             29.6       07-10    141  |  104  |  10  ----------------------------<  102<H>  3.9   |  25  |  0.73    Ca    9.2      10 Jul 2017 06:17  Phos  3.2     07-10  Mg     2.1     07-10            MICROBIOLOGY:  Vancomycin Level, Trough: 9.0 ug/mL (07-11-17 @ 01:26)  Vanco dose adjusted    Culture - Blood (07.09.17 @ 14:05)    Culture - Blood:   NO ORGANISMS ISOLATED  NO ORGANISMS ISOLATED AT 48 HRS.    Specimen Source: BLOOD VENOUS    Culture - Blood (07.09.17 @ 14:05)    Culture - Blood:   NO ORGANISMS ISOLATED  NO ORGANISMS ISOLATED AT 48 HRS.    Specimen Source: BLOOD VENOUS    Culture - Blood (07.09.17 @ 14:05)    Culture - Blood:   NO ORGANISMS ISOLATED  NO ORGANISMS ISOLATED AT 48 HRS.    Specimen Source: BLOOD PERIPHERAL      < from: NM 3-Phase Bone Scan (07.11.17 @ 13:33) >    IMPRESSION:  Abnormal three phase bone scan. In light of the patient   historyof recent fall, focally increased radiopharmaceutical   accumulation in the medial malleolar region of the right ankle raises the   possibility of trauma at this site. In view of negative x-rays, CT of the   ankle may be helpful for further evaluation.    The findings are not suggestive of complex regional pain syndrome.    < end of copied text >

## 2021-09-14 NOTE — TELEPHONE ENCOUNTER
Current mental health referral not accepting patients due to high wait time (8+ months) back up is Behavioral access number- [phone]    Previous psychiatry clinic have an obligation to continue care until they are able to see a prescriber per Jana. There are not enough psych providers in existence at this time to be able to get to all of the people who need services which is why there is a wait.      I spoke with Dr. Gage, who said he would be willing to bridge the patient until he is able to get in to see another provider with the understanding that he would have previous 2 psych locations send records to us, regular check ins, as well as possible short term consultation with Dr. Trinidad.     The patient was very upset with this, states he cannot believe we would tell this to a psychiatric patient. I let him know that this is unfortunately what we have to work with and the options are out of our hands.     I suggested that he has the option of staying with his current practice until he is able to re-establish, they have a responsibility to cover the prescriptions until he is able to find a new provider. He does not want to do this.     He is welcome to look for other options before going this route, however it may be just as difficult to find a provider elsewhere.     If he decides to go with us on this he will need:  1. A visit with Dr. Gage in person prior to any prescriptions  2. Records sent from his previous providers  3. Make an appointment for first available through behavioral access number [phone]    Patient did not want to continue with the call, states he is feeling overwhelmed. He requested that I call tomorrow at 10AM. Will call and offer options again tomorrow.   Nava Dickens, EMT at 3:49 PM on 9/14/2021.  [facility]  [facility]  [place]  [phone]

## 2021-09-14 NOTE — TELEPHONE ENCOUNTER
I spoke with the patient on the phone about previous call below.     Patient previously saw ADHD psych provider for x10 years, could not see her anymore as of 4 months ago due to changes in covered conditions in their clinic. He then established with a new provider x4 months ago and says his experience has been horrible, very expensive with insurance and he is distrustful of the provider and will not be returning.     I advised the patient that it may be some time to get in to see a psych provider, patient became very upset, states he cannot wait x6 months to see a provider and asked if his case could be prioritized as it had previously with other referrals. I explained that unfortunately psych services are a bit different due to the limited resources and strain on the system during the pandemic. We do not have control over this scheduling and I could not guarantee that he could get in to see a provider in the next few weeks.     I asked if it was a possiblility to see another provider in his current provider practice as it may be some time before he can get in to see a new one. Patient was dissatisfied with the suggestion, stating he would not be willing to return due to distrusting everyone at that practice. He expresses frustration that getting mental health resources is not straightforward.     Patient asking if Dr. Gage will prescribe his controlled medication while he is waiting to get in to a provider, states he will be out of medication before his October visit currently scheduled. I let him know this is provider specific and would have to be discussed with Dr. Gage. Patient asking for sooner add on appointment with Dr. Gage for this.     Nava Dickens, EMT at 12:51 PM on 9/14/2021.  [facility]  [facility]  [place]  [phone]

## 2021-09-14 NOTE — TELEPHONE ENCOUNTER
Called and left patient VM.  If patient is requesting a referral to a new psychiatrist for ADHD, let us know.      If patient requesting a referral to a psychiatrist for something else, patient will need to schedule a telephone visit with Dr. Gage to discuss.      Bry Savage CMA ([facility]) at 12:03 PM on 9/14/2021

## 2021-09-15 NOTE — TELEPHONE ENCOUNTER
Called, unable to reach patient. Will send mychart with all relevant information   Nava Dickens, EMT at 9:58 AM on 9/15/2021.  [facility]  [facility]  [place]  [phone]

## 2021-09-19 ENCOUNTER — HEALTH MAINTENANCE LETTER (OUTPATIENT)
Age: 26
End: 2021-09-19

## 2021-10-17 NOTE — PROGRESS NOTES
HPI:    Pt. Comes in for a physical today. Last telephone visit with me 6/26/2020. Overall he is doing well. Somewhat elevated BP that he would like to follow. He does snore and would like sleep Medicine evaluation and possibly ENT (he did have tonsillectomy). He does not smoke nor abuse EtOH. Family h/o HTN. He would like to be checked for STD's but considers he is low risk. No other HEENT, cardiopulmonary, abdominal, , neurological, systemic, psychiatric, lymphatic, endocrine complaints.     Past Medical History:   Diagnosis Date     ADHD      Asthma      Dyslipidemia      Obesity      Past Surgical History:   Procedure Laterality Date     TONSILLECTOMY       PE:    Vitals noted, gen, nad, cooperative alert, neck supple, oropharynx clear, no tonsil hypertrophy, nl rom, lungs with good air movement, RRR, S1, S2, no MRG, abdomen, no acute findings. Grossly normal neurological exam.     A/P:    1. Immunizations; he has gotten the Pfizer COVID vaccination series. Influenza vaccine today 10/18/2021.   2. On Flovent and Albuterol on as needed when he has an URI. Does not need to uses this routinely.   3. On  for ADHD and sees an outside psychiatrist. He is on a new medication for about 3 weeks.   4. Seen Dr. De Oliveira, Urology for ED and testicular/groin complaints. 10/29/2020  5. Seen GI 8/3/2020, Dr. Cotto for abnormal liver tests. He had abdominal U/S 8/1/2020 with hepatic steatosis. Ordered labs today.   6. Ordered STD tests today.   7. Placed Sleep Medicine referral He does snore. He had his tonsils removed.   8. Borderline HTN; he does exercise.Some family h/o HTN. Recommend checking at home. Discussed could do a 24 hour BP monitor as well.    I will see him back in about 3 months follow up on BP, labs, liver tests and sleep medicine appt.

## 2021-10-18 ENCOUNTER — LAB (OUTPATIENT)
Dept: LAB | Facility: CLINIC | Age: 26
End: 2021-10-18
Payer: COMMERCIAL

## 2021-10-18 ENCOUNTER — OFFICE VISIT (OUTPATIENT)
Dept: INTERNAL MEDICINE | Facility: CLINIC | Age: 26
End: 2021-10-18
Payer: COMMERCIAL

## 2021-10-18 VITALS
HEART RATE: 89 BPM | SYSTOLIC BLOOD PRESSURE: 148 MMHG | WEIGHT: 238.2 LBS | OXYGEN SATURATION: 98 % | HEIGHT: 69 IN | BODY MASS INDEX: 35.28 KG/M2 | DIASTOLIC BLOOD PRESSURE: 88 MMHG

## 2021-10-18 DIAGNOSIS — R74.8 ELEVATED LIVER ENZYMES: Primary | ICD-10-CM

## 2021-10-18 DIAGNOSIS — Z11.3 SCREEN FOR STD (SEXUALLY TRANSMITTED DISEASE): ICD-10-CM

## 2021-10-18 DIAGNOSIS — R74.8 ELEVATED LIVER ENZYMES: ICD-10-CM

## 2021-10-18 DIAGNOSIS — R06.83 SNORING: ICD-10-CM

## 2021-10-18 LAB
ALBUMIN SERPL-MCNC: 4.4 G/DL (ref 3.4–5)
ALP SERPL-CCNC: 77 U/L (ref 40–150)
ALT SERPL W P-5'-P-CCNC: 113 U/L (ref 0–70)
ANION GAP SERPL CALCULATED.3IONS-SCNC: 5 MMOL/L (ref 3–14)
AST SERPL W P-5'-P-CCNC: 44 U/L (ref 0–45)
BASOPHILS # BLD AUTO: 0.1 10E3/UL (ref 0–0.2)
BASOPHILS NFR BLD AUTO: 1 %
BILIRUB SERPL-MCNC: 0.4 MG/DL (ref 0.2–1.3)
BUN SERPL-MCNC: 18 MG/DL (ref 7–30)
CALCIUM SERPL-MCNC: 9.2 MG/DL (ref 8.5–10.1)
CHLORIDE BLD-SCNC: 109 MMOL/L (ref 94–109)
CHOLEST SERPL-MCNC: 209 MG/DL
CO2 SERPL-SCNC: 29 MMOL/L (ref 20–32)
CREAT SERPL-MCNC: 1.04 MG/DL (ref 0.66–1.25)
EOSINOPHIL # BLD AUTO: 0.5 10E3/UL (ref 0–0.7)
EOSINOPHIL NFR BLD AUTO: 6 %
ERYTHROCYTE [DISTWIDTH] IN BLOOD BY AUTOMATED COUNT: 12.8 % (ref 10–15)
FASTING STATUS PATIENT QL REPORTED: ABNORMAL
GFR SERPL CREATININE-BSD FRML MDRD: >90 ML/MIN/1.73M2
GLUCOSE BLD-MCNC: 100 MG/DL (ref 70–99)
HCT VFR BLD AUTO: 47.2 % (ref 40–53)
HDLC SERPL-MCNC: 55 MG/DL
HGB BLD-MCNC: 16.4 G/DL (ref 13.3–17.7)
IMM GRANULOCYTES # BLD: 0 10E3/UL
IMM GRANULOCYTES NFR BLD: 0 %
LDLC SERPL CALC-MCNC: 141 MG/DL
LYMPHOCYTES # BLD AUTO: 3 10E3/UL (ref 0.8–5.3)
LYMPHOCYTES NFR BLD AUTO: 39 %
MCH RBC QN AUTO: 29.5 PG (ref 26.5–33)
MCHC RBC AUTO-ENTMCNC: 34.7 G/DL (ref 31.5–36.5)
MCV RBC AUTO: 85 FL (ref 78–100)
MONOCYTES # BLD AUTO: 0.5 10E3/UL (ref 0–1.3)
MONOCYTES NFR BLD AUTO: 7 %
NEUTROPHILS # BLD AUTO: 3.6 10E3/UL (ref 1.6–8.3)
NEUTROPHILS NFR BLD AUTO: 47 %
NONHDLC SERPL-MCNC: 154 MG/DL
NRBC # BLD AUTO: 0 10E3/UL
NRBC BLD AUTO-RTO: 0 /100
PLATELET # BLD AUTO: 273 10E3/UL (ref 150–450)
POTASSIUM BLD-SCNC: 3.8 MMOL/L (ref 3.4–5.3)
PROT SERPL-MCNC: 7.5 G/DL (ref 6.8–8.8)
RBC # BLD AUTO: 5.55 10E6/UL (ref 4.4–5.9)
SODIUM SERPL-SCNC: 143 MMOL/L (ref 133–144)
TRIGL SERPL-MCNC: 64 MG/DL
WBC # BLD AUTO: 7.6 10E3/UL (ref 4–11)

## 2021-10-18 PROCEDURE — 36415 COLL VENOUS BLD VENIPUNCTURE: CPT | Performed by: PATHOLOGY

## 2021-10-18 PROCEDURE — 85025 COMPLETE CBC W/AUTO DIFF WBC: CPT | Performed by: PATHOLOGY

## 2021-10-18 PROCEDURE — 80053 COMPREHEN METABOLIC PANEL: CPT | Performed by: PATHOLOGY

## 2021-10-18 PROCEDURE — 86803 HEPATITIS C AB TEST: CPT | Mod: 90 | Performed by: PATHOLOGY

## 2021-10-18 PROCEDURE — 86780 TREPONEMA PALLIDUM: CPT | Mod: 90 | Performed by: PATHOLOGY

## 2021-10-18 PROCEDURE — 99000 SPECIMEN HANDLING OFFICE-LAB: CPT | Performed by: PATHOLOGY

## 2021-10-18 PROCEDURE — 90686 IIV4 VACC NO PRSV 0.5 ML IM: CPT | Performed by: INTERNAL MEDICINE

## 2021-10-18 PROCEDURE — 87591 N.GONORRHOEAE DNA AMP PROB: CPT | Mod: 90 | Performed by: PATHOLOGY

## 2021-10-18 PROCEDURE — 87389 HIV-1 AG W/HIV-1&-2 AB AG IA: CPT | Mod: 90 | Performed by: PATHOLOGY

## 2021-10-18 PROCEDURE — 90471 IMMUNIZATION ADMIN: CPT | Performed by: INTERNAL MEDICINE

## 2021-10-18 PROCEDURE — 99395 PREV VISIT EST AGE 18-39: CPT | Mod: 25 | Performed by: INTERNAL MEDICINE

## 2021-10-18 PROCEDURE — 80061 LIPID PANEL: CPT | Performed by: PATHOLOGY

## 2021-10-18 PROCEDURE — 87491 CHLMYD TRACH DNA AMP PROBE: CPT | Mod: 90 | Performed by: PATHOLOGY

## 2021-10-18 ASSESSMENT — MIFFLIN-ST. JEOR: SCORE: 2055.85

## 2021-10-18 ASSESSMENT — PAIN SCALES - GENERAL: PAINLEVEL: NO PAIN (0)

## 2021-10-18 NOTE — NURSING NOTE
Miguel Angel Hernandez is a 26 year old male patient that presents today in clinic for the following:    Chief Complaint   Patient presents with     Physical     The patient's allergies and medications were reviewed as noted. A set of vitals were recorded as noted without incident. The patient does not have any other questions for the provider.    Jose Plascencia, EMT at 3:30 PM on 10/18/2021

## 2021-10-18 NOTE — PROGRESS NOTES
At the request of Dr. Layo Gage, Miguel Angel Hernandez received the Influenza Vaccine Fluzone High-Dose Quadrivalent [id number] Formula For 65 yrs of age and older vaccine today in clinic. The flu shot was given under the supervision of Dr. Layo Gage if assistance was needed. The immunization site was cleaned with an alcohol prep wipe. The flu shot was given without incident--see immunization list for administration details. No swelling or redness was observed at the site of injection after the immunization was given. The patient reported to the first floor laboratory of the [facility] for further evaluation.    Andrew Canas, EMT at 4:14 PM on 10/18/2021

## 2021-10-19 LAB
C TRACH DNA SPEC QL NAA+PROBE: NEGATIVE
HCV AB SERPL QL IA: NONREACTIVE
HIV 1+2 AB+HIV1 P24 AG SERPL QL IA: NONREACTIVE
N GONORRHOEA DNA SPEC QL NAA+PROBE: NEGATIVE
T PALLIDUM AB SER QL: NONREACTIVE

## 2021-11-14 ENCOUNTER — HEALTH MAINTENANCE LETTER (OUTPATIENT)
Age: 26
End: 2021-11-14

## 2022-01-05 ENCOUNTER — VIRTUAL VISIT (OUTPATIENT)
Dept: SLEEP MEDICINE | Facility: CLINIC | Age: 27
End: 2022-01-05
Payer: COMMERCIAL

## 2022-01-05 VITALS — HEIGHT: 68 IN | BODY MASS INDEX: 33.34 KG/M2 | WEIGHT: 220 LBS

## 2022-01-05 DIAGNOSIS — R06.83 SNORING: Primary | ICD-10-CM

## 2022-01-05 DIAGNOSIS — R03.0 BORDERLINE BLOOD PRESSURE: ICD-10-CM

## 2022-01-05 DIAGNOSIS — E66.09 CLASS 1 OBESITY DUE TO EXCESS CALORIES WITH BODY MASS INDEX (BMI) OF 33.0 TO 33.9 IN ADULT, UNSPECIFIED WHETHER SERIOUS COMORBIDITY PRESENT: ICD-10-CM

## 2022-01-05 DIAGNOSIS — E66.811 CLASS 1 OBESITY DUE TO EXCESS CALORIES WITH BODY MASS INDEX (BMI) OF 33.0 TO 33.9 IN ADULT, UNSPECIFIED WHETHER SERIOUS COMORBIDITY PRESENT: ICD-10-CM

## 2022-01-05 DIAGNOSIS — R53.83 OTHER FATIGUE: ICD-10-CM

## 2022-01-05 PROCEDURE — 99204 OFFICE O/P NEW MOD 45 MIN: CPT | Mod: 95 | Performed by: INTERNAL MEDICINE

## 2022-01-05 RX ORDER — GUANFACINE 1 MG/1
TABLET ORAL
COMMUNITY
Start: 2021-12-30 | End: 2024-06-04

## 2022-01-05 RX ORDER — METHYLPHENIDATE HYDROCHLORIDE 36 MG/1
72 TABLET, EXTENDED RELEASE ORAL
COMMUNITY
End: 2024-06-10

## 2022-01-05 RX ORDER — ZOLPIDEM TARTRATE 10 MG/1
TABLET ORAL
Qty: 1 TABLET | Refills: 0 | Status: SHIPPED | OUTPATIENT
Start: 2022-01-05 | End: 2022-01-18

## 2022-01-05 ASSESSMENT — SLEEP AND FATIGUE QUESTIONNAIRES
HOW LIKELY ARE YOU TO NOD OFF OR FALL ASLEEP WHILE SITTING INACTIVE IN A PUBLIC PLACE: WOULD NEVER DOZE
HOW LIKELY ARE YOU TO NOD OFF OR FALL ASLEEP WHILE LYING DOWN TO REST IN THE AFTERNOON WHEN CIRCUMSTANCES PERMIT: WOULD NEVER DOZE
HOW LIKELY ARE YOU TO NOD OFF OR FALL ASLEEP WHILE SITTING AND TALKING TO SOMEONE: WOULD NEVER DOZE
HOW LIKELY ARE YOU TO NOD OFF OR FALL ASLEEP IN A CAR, WHILE STOPPED FOR A FEW MINUTES IN TRAFFIC: WOULD NEVER DOZE
HOW LIKELY ARE YOU TO NOD OFF OR FALL ASLEEP WHEN YOU ARE A PASSENGER IN A CAR FOR AN HOUR WITHOUT A BREAK: WOULD NEVER DOZE
HOW LIKELY ARE YOU TO NOD OFF OR FALL ASLEEP WHILE WATCHING TV: WOULD NEVER DOZE
HOW LIKELY ARE YOU TO NOD OFF OR FALL ASLEEP WHILE SITTING AND READING: WOULD NEVER DOZE
HOW LIKELY ARE YOU TO NOD OFF OR FALL ASLEEP WHILE SITTING QUIETLY AFTER LUNCH WITHOUT ALCOHOL: WOULD NEVER DOZE

## 2022-01-05 ASSESSMENT — ENCOUNTER SYMPTOMS
SORE THROAT: 1
SMELL DISTURBANCE: 0
TROUBLE SWALLOWING: 0
HOARSE VOICE: 0
NECK MASS: 0
TASTE DISTURBANCE: 0
SINUS CONGESTION: 1
SINUS PAIN: 0

## 2022-01-05 ASSESSMENT — MIFFLIN-ST. JEOR: SCORE: 1957.41

## 2022-01-05 ASSESSMENT — PAIN SCALES - GENERAL: PAINLEVEL: NO PAIN (0)

## 2022-01-05 NOTE — LETTER
1/5/2022         RE: Miguel Angel Hernandez  [address] [id number]        Dear Colleague,    Thank you for referring your patient, Miguel Angel Hernandez, to the [facility]. Please see a copy of my visit note below.    Lenard is a 26 year old who is being evaluated via a billable video visit.      How would you like to obtain your AVS? MyChart  If the video visit is dropped, the invitation should be resent by: Text to cell phone: [phone]  Will anyone else be joining your video visit? No      Video Start Time: 12:43 PM  Video-Visit Details    Type of service:  Video Visit    Video End Time:1:29 PM    Originating Location (pt. Location): Home    Distant Location (provider location):  [facility]     Platform used 26-year-old for Video Visit: Damian     Chief complaint: Referred by Dr. Gage for evaluation of snoring, elevated blood pressure measurements    History of Present Illness: 26-year-old gentleman with long history of snoring, obesity, elevated blood pressure measurements without diagnosis of hypertension.  He had tonsillectomy as a child but he is not sure the indication.  Otherwise he reports  that he has snored for very long time.  He has been told by multiple people about loud snoring and it is often disruptive to anybody else in the room.  Snoring can be heard outside the room.  No one has offered that they have observed apneas.  However, he has not actually asked.  He has woken himself up with a snort on occasion.  He denies waking up with headaches or jaw pain.      Typical bedtime is between 11 om and 1 AM with rise time between 7 and 9 AM.  He is not taking any sleep aids at this time.  He is not taking naps.  He is little fatigue during the day but takes stimulants for ADHD.  If he tries to lay down he notes he cannot fall asleep.  He has been taking the stimulants for at least 15 years.  He also drinks 1 to 2 cups of  coffee in the morning.  His weight has been stable.      No history of sleepwalking as an adult.  He did do some sleepwalking as a child.  He also has been told about sleep talking.  Otherwise, no restless legs, dream enactment behavior nightmares.  He recently is recovering from an asthma exacerbation.  At baseline he does not need to routinely use albuterol inhaler at night.    [place] Sleepiness Scale   Sitting and reading: Would never doze   Watching TV: Would never doze   Sitting, inactive in a public place (e.g. a theatre or a meeting): Would never doze   As a passenger in a car for an hour without a break: Would never doze   Lying down to rest in the afternoon when circumstances permit: Would never doze   Sitting and talking to someone: Would never doze   Sitting quietly after a lunch without alcohol: Would never doze   In a car, while stopped for a few minutes in traffic: Would never doze   Total score - [place]: 0   (Less than 10 normal)    Insomnia Severity Scale  KENNETH Total Score: 10  Total score categories:  0-7 = No clinically significant insomnia   8-14 = Subthreshold insomnia   15-21 = Clinical insomnia (moderate severity)  22-28 = Clinical insomnia (severe)    STOP-BANG  Loud Snore   1  Excessively Tired/Sleepy   0  Observed apnea   0  Hypertension   0-1  BMI> 35 kg/m2   0  Age >50   0  Neck >16 in/40cm   0  Male Gender   1  Total =   2-3  (0-2 low, 3-4 intermediate, 5-8 high risk of DANIELLA)      Past Medical History:   Diagnosis Date     ADHD      Asthma      Dyslipidemia      Obesity        Allergies   Allergen Reactions     Seasonal Allergies        Current Outpatient Medications   Medication     albuterol (PROAIR HFA/PROVENTIL HFA/VENTOLIN HFA) 108 (90 Base) MCG/ACT inhaler     FLOVENT  MCG/ACT inhaler     methylphenidate (CONCERTA) 54 MG CR tablet     sildenafil (REVATIO) 20 MG tablet     No current facility-administered medications for this visit.       Social History     Socioeconomic  "History     Marital status: Single     Spouse name: Not on file     Number of children: Not on file     Years of education: Not on file     Highest education level: Not on file   Occupational History     Not on file   Tobacco Use     Smoking status: Former Smoker     Types: Other     Smokeless tobacco: Never Used     Tobacco comment: \"recreational smoker--marijuana\"    Substance and Sexual Activity     Alcohol use: Not on file     Drug use: Not on file     Sexual activity: Not on file   Other Topics Concern     Not on file   Social History Narrative     Not on file     Social Determinants of Health     Financial Resource Strain: Not on file   Food Insecurity: Not on file   Transportation Needs: Not on file   Physical Activity: Not on file   Stress: Not on file   Social Connections: Not on file   Intimate Partner Violence: Not on file   Housing Stability: Not on file       Family History   Problem Relation Age of Onset     Diabetes Maternal Grandmother      Liver Disease No family hx of      Colon Cancer No family hx of        Review of Systems:   Answers for HPI/ROS submitted by the patient on 1/5/2022  General Symptoms: No  Skin Symptoms: No  HENT Symptoms: Yes  EYE SYMPTOMS: No  HEART SYMPTOMS: No  LUNG SYMPTOMS: No  INTESTINAL SYMPTOMS: No  URINARY SYMPTOMS: No  REPRODUCTIVE SYMPTOMS: No  SKELETAL SYMPTOMS: No  BLOOD SYMPTOMS: No  NERVOUS SYSTEM SYMPTOMS: No  MENTAL HEALTH SYMPTOMS: No  Ear pain: No  Ear discharge: No  Hearing loss: No  Tinnitus: No  Nosebleeds: No  Congestion: Yes  Sinus pain: No  Trouble swallowing: No   Voice hoarseness: No  Mouth sores: No  Sore throat: Yes  Tooth pain: No  Gum tenderness: No  Bleeding gums: No  Change in taste: No  Change in sense of smell: No  Dry mouth: No  Hearing aid used: No  Neck lump: No        EXAM:  Ht 1.727 m (5' 8\")   Wt 99.8 kg (220 lb)   BMI 33.45 kg/m    GENERAL: Healthy, alert and no distress  EYES: Eyes grossly normal to inspection.  No discharge or " erythema, or obvious scleral/conjunctival abnormalities.  RESP: No audible wheeze, cough, or visible cyanosis.  No visible retractions or increased work of breathing.    SKIN: Visible skin clear. No significant rash, abnormal pigmentation or lesions.  NEURO: Cranial nerves grossly intact.  Mentation and speech appropriate for age.  PSYCH: Mentation appears normal, affect normal, judgement and insight intact, normal speech and appearance well-groomed.     No results found for: TSH      ASSESSMENT:  26-year-old gentleman with history of ADHD on stimulants, loud disruptive snoring with snort arousals, daytime fatigue but not drowsiness.  Stimulants could be masking some drowsiness associated with untreated sleep disordered breathing.  It also may be challenge to identify whether fatigue is related to any sleep disruption associated with arousals from medication versus sleep disordered breathing, therefore in lab sleep study would be beneficial.    PLAN:  Recommended in lab PSG for better recognition of arousals and their etiology.  We reviewed treatment options of obstructive sleep apnea.  Patient would find appliances more acceptable.  We reviewed the pathophysiology of obstructive sleep apnea and the importance of treating it should he have it.  Also reviewed the potential benefits of weight loss.  If no sleep apnea identified treatments for snoring were also reviewed.  Please see patient instructions for further details of counseling provided.  On the day of the sleep study recommended patient take less stimulants if possible and a prescription was generated for a sleep aid to take at the lab if needed.  Patient prefers video visit for follow-up of results.      49 minutes spent on the date of the encounter doing chart review, history and exam, documentation and further activities per the note    Marleni Zuniga M.D.  Pulmonary/Critical Care/Sleep Medicine    [facility]  Professional Building   Floor 1, [address]   [address] [place] [id number]   Appointments: [phone]    The above note was dictated using voice recognition software and may include typographical errors. Please contact the author for any clarifications.                Again, thank you for allowing me to participate in the care of your patient.        Sincerely,        Marleni Zuniga MD

## 2022-01-05 NOTE — PATIENT INSTRUCTIONS
"      MY TREATMENT INFORMATION FOR SLEEP APNEA-  Miguel Angel Hernandez    DOCTOR : Marlein Zuniga MD    Am I having a sleep study at a sleep center? ordered  --->Due to insurance clearance delays, you will be contacted within 2-4 weeks to schedule    Am I having a home sleep study? Not ordered  --->Watch the video for the device you are using:    -/drop off device-   https://www.TheFriendMail.com/watch?v=yGGFBdELGhk    -Disposable device sent out require phone/computer application-   https://www.TheFriendMail.com/watch?v=BCce_vbiwxE      Frequently asked questions:  1. What is Obstructive Sleep Apnea (DANIELLA)? DANIELLA is the most common type of sleep apnea. Apnea means, \"without breath.\"  Apnea is most often caused by narrowing or collapse of the upper airway as muscles relax during sleep.   Almost everyone has occasional apneas. Most people with sleep apnea have had brief interruptions at night frequently for many years.  The severity of sleep apnea is related to how frequent and severe the events are.   2. What are the consequences of DANIELLA? Symptoms include: feeling sleepy during the day, snoring loudly, gasping or stopping of breathing, trouble sleeping, and occasionally morning headaches or heartburn at night.  Sleepiness can be serious and even increase the risk of falling asleep while driving. Other health consequences may include development of high blood pressure and other cardiovascular disease in persons who are susceptible. Untreated DANIELLA  can contribute to heart disease, stroke and diabetes.   3. What are the treatment options? In most situations, sleep apnea is a lifelong disease that must be managed with daily therapy. Medications are not effective for sleep apnea and surgery is generally not considered until other therapies have been tried. Your treatment is your choice . Continuous Positive Airway (CPAP) works right away and is the therapy that is effective in nearly everyone. An oral device to hold your jaw " forward is usually the next most reliable option. Other options include postioning devices (to keep you off your back), weight loss, and surgery including a tongue pacing device. There is more detail about some of these options below.  4. Are my sleep studies covered by insurance? Although we will request verification of coverage, we advise you also check in advance of the study to ensure there is coverage.    Important tips for those choosing CPAP and similar devices   Know your equipment:  CPAP is continuous positive airway pressure that prevents obstructive sleep apnea by keeping the throat from collapsing while you are sleeping. In most cases, the device is  smart  and can slowly self-adjusts if your throat collapses and keeps a record every day of how well you are treated-this information is available to you and your care team.  BPAP is bilevel positive airway pressure that keeps your throat open and also assists each breath with a pressure boost to maintain adequate breathing.  Special kinds of BPAP are used in patients who have inadequate breathing from lung or heart disease. In most cases, the device is  smart  and can slowly self-adjusts to assist breathing. Like CPAP, the device keeps a record of how well you are treated.  Your mask is your connection to the device. You get to choose what feels most comfortable and the staff will help to make sure if fits. Here: are some examples of the different masks that are available:       Key points to remember on your journey with sleep apnea:  1. Sleep study.  PAP devices often need to be adjusted during a sleep study to show that they are effective and adjusted right.  2. Good tips to remember: Try wearing just the mask during a quiet time during the day so your body adapts to wearing it. A humidifier is recommended for comfort in most cases to prevent drying of your nose and throat. Allergy medication from your provider may help you if you are having nasal  congestion.  3. Getting settled-in. It takes more than one night for most of us to get used to wearing a mask. Try wearing just the mask during a quiet time during the day so your body adapts to wearing it. A humidifier is recommended for comfort in most cases. Our team will work with you carefully on the first day and will be in contact within 4 days and again at 2 and 4 weeks for advice and remote device adjustments. Your therapy is evaluated by the device each day.   4. Use it every night. The more you are able to sleep naturally for 7-8 hours, the more likely you will have good sleep and to prevent health risks or symptoms from sleep apnea. Even if you use it 4 hours it helps. Occasionally all of us are unable to use a medical therapy, in sleep apnea, it is not dangerous to miss one night.   5. Communicate. Call our skilled team on the number provided on the first day if your visit for problems that make it difficult to wear the device. Over 2 out of 3 patients can learn to wear the device long-term with help from our team. Remember to call our team or your sleep providers if you are unable to wear the device as we may have other solutions for those who cannot adapt to mask CPAP therapy. It is recommended that you sleep your sleep provider within the first 3 months and yearly after that if you are not having problems.   6. Use it for your health. We encourage use of CPAP masks during daytime quiet periods to allow your face and brain to adapt to the sensation of CPAP so that it will be a more natural sensation to awaken to at night or during naps. This can be very useful during the first few weeks or months of adapting to CPAP though it does not help medically to wear CPAP during wakefulness and  should not be used as a strategy just to meet guidelines.  7. Take care of your equipment. Make sure you clean your mask and tubing using directions every day and that your filter and mask are replaced as recommended or  if they are not working.     BESIDES CPAP, WHAT OTHER THERAPIES ARE THERE?    Positioning Device  Positioning devices are generally used when sleep apnea is mild and only occurs on your back.This example shows a pillow that straps around the waist. It may be appropriate for those whose sleep study shows milder sleep apnea that occurs primarily when lying flat on one's back. Preliminary studies have shown benefit but effectiveness at home may need to be verified by a home sleep test. These devices are generally not covered by medical insurance.  Examples of devices that maintain sleeping on the back to prevent snoring and mild sleep apnea.    Belt type body positioner  http://[employer]/    Electronic reminder  http://nightshifttherapy.com/  http://www.[employer].EventRadar.au/      Oral Appliance  What is oral appliance therapy?  An oral appliance device fits on your teeth at night like a retainer used after having braces. The device is made by a specialized dentist and requires several visits over 1-2 months before a manufactured device is made to fit your teeth and is adjusted to prevent your sleep apnea. Once an oral device is working properly, snoring should be improved. A home sleep test may be recommended at that time if to determine whether the sleep apnea is adequately treated.       Some things to remember:  -Oral devices are often, but not always, covered by your medical insurance. Be sure to check with your insurance provider.   -If you are referred for oral therapy, you will be given a list of specialized dentists to consider or you may choose to visit the Web site of the American Academy of Dental Sleep Medicine  -Oral devices are less likely to work if you have severe sleep apnea or are extremely overweight.     More detailed information  An oral appliance is a small acrylic device that fits over the upper and lower teeth  (similar to a retainer or a mouth guard). This device slightly moves jaw forward, which  moves the base of the tongue forward, opens the airway, improves breathing for effective treat snoring and obstructive sleep apnea in perhaps 7 out of 10 people .  The best working devices are custom-made by a dental device  after a mold is made of the teeth 1, 2, 3.  When is an oral appliance indicated?  Oral appliance therapy is recommended as a first-line treatment for patients with primary snoring, mild sleep apnea, and for patients with moderate sleep apnea who prefer appliance therapy to use of CPAP4, 5. Severity of sleep apnea is determined by sleep testing and is based on the number of respiratory events per hour of sleep.   How successful is oral appliance therapy?  The success rate of oral appliance therapy in patients with mild sleep apnea is 75-80% while in patients with moderate sleep apnea it is 50-70%. The chance of success in patients with severe sleep apnea is 40-50%. The research also shows that oral appliances have a beneficial effect on the cardiovascular health of DANIELLA patients at the same magnitude as CPAP therapy7.  Oral appliances should be a second-line treatment in cases of severe sleep apnea, but if not completely successful then a combination therapy utilizing CPAP plus oral appliance therapy may be effective. Oral appliances tend to be effective in a broad range of patients although studies show that the patients who have the highest success are females, younger patients, those with milder disease, and less severe obesity. 3, 6.   Finding a dentist that practices dental sleep medicine  Specific training is available through the American Academy of Dental Sleep Medicine for dentists interested in working in the field of sleep. To find a dentist who is educated in the field of sleep and the use of oral appliances, near you, visit the Web site of the American Academy of Dental Sleep Medicine.    References  1. akanksha Crocker al. Objectively measured vs self-reported compliance  during oral appliance therapy for sleep-disordered breathing. Chest 2013; 144(5): 7172-6010.  2. Oracio, et al. Objective measurement of compliance during oral appliance therapy for sleep-disordered breathing. Thorax 2013; 68(1): 91-96.  3. Roselyn et al. Mandibular advancement devices in 620 men and women with DANIELLA and snoring: tolerability and predictors of treatment success. Chest 2004; 125: 2378-1954.  4. Lyric et al. Oral appliances for snoring and DANIELLA: a review. Sleep 2006; 29: 244-262.  5. Naeem et al. Oral appliance treatment for DANIELLA: an update. J Clin Sleep Med 2014; 10(2): 215-227.  6. Werner et al. Predictors of OSAH treatment outcome. J Dent Res 2007; 86: 1239-3031.      Weight Loss:    Weight loss is a long-term strategy that may improve sleep apnea in some patients.    Weight management is a personal decision and the decision should be based on your interest and the potential benefits.  If you are interested in exploring weight loss strategies, the following discussion covers the impact on weight loss on sleep apnea and the approaches that may be successful.    Being overweight does not necessarily mean you will have health consequences.  Those who have BMI over 35 or over 27 with existing medical conditions carries greater risk.   Weight loss decreases severity of sleep apnea in most people with obesity. For those with mild obesity who have developed snoring with weight gain, even 15-30 pound weight loss can improve and occasionally eliminate sleep apnea.  Structured and life-long dietary and health habits are necessary to lose weight and keep healthier weight levels.     Though there may be significant health benefits from weight loss, long-term weight loss is very difficult to achieve- studies show success with dietary management in less than 10% of people. In addition, substantial weight loss may require years of dietary control and may be difficult if patients have severe  obesity. In these cases, surgical management may be considered.  Finally, older individuals who have tolerated obesity without health complications may be less likely to benefit from weight loss strategies.        Your BMI is Body mass index is 33.45 kg/m .  Weight management is a personal decision.  If you are interested in exploring weight loss strategies, the following discussion covers the approaches that may be successful. Body mass index (BMI) is one way to tell whether you are at a healthy weight, overweight, or obese. It measures your weight in relation to your height.  A BMI of 18.5 to 24.9 is in the healthy range. A person with a BMI of 25 to 29.9 is considered overweight, and someone with a BMI of 30 or greater is considered obese. More than two-thirds of American adults are considered overweight or obese.  Being overweight or obese increases the risk for further weight gain. Excess weight may lead to heart disease and diabetes.  Creating and following plans for healthy eating and physical activity may help you improve your health.  Weight control is part of healthy lifestyle and includes exercise, emotional health, and healthy eating habits. Careful eating habits lifelong are the mainstay of weight control. Though there are significant health benefits from weight loss, long-term weight loss with diet alone may be very difficult to achieve- studies show long-term success with dietary management in less than 10% of people. Attaining a healthy weight may be especially difficult to achieve in those with severe obesity. In some cases, medications, devices and surgical management might be considered.  What can you do?  If you are overweight or obese and are interested in methods for weight loss, you should discuss this with your provider.     Consider reducing daily calorie intake by 500 calories.     Keep a food journal.     Avoiding skipping meals, consider cutting portions instead.    Diet combined with  exercise helps maintain muscle while optimizing fat loss. Strength training is particularly important for building and maintaining muscle mass. Exercise helps reduce stress, increase energy, and improves fitness. Increasing exercise without diet control, however, may not burn enough calories to loose weight.       Start walking three days a week 10-20 minutes at a time    Work towards walking thirty minutes five days a week     Eventually, increase the speed of your walking for 1-2 minutes at time      And look into health and wellness programs that may be available through your health insurance provider, employer, local community center, or [employer] club.    Weight management plan: Patient was referred to their PCP to discuss a diet and exercise plan.      Surgery:    Surgery for obstructive sleep apnea is considered generally only when other therapies fail to work. Surgery may be discussed with you if you are having a difficult time tolerating CPAP and or when there is an abnormal structure that requires surgical correction.  Nose and throat surgeries often enlarge the airway to prevent collapse.  Most of these surgeries create pain for 1-2 weeks and up to half of the most common surgeries are not effective throughout life.  You should carefully discuss the benefits and drawbacks to surgery with your sleep provider and surgeon to determine if it is the best solution for you.   More information  Surgery for DANIELLA is directed at areas that are responsible for narrowing or complete obstruction of the airway during sleep.  There are a wide range of procedures available to enlarge and/or stabilize the airway to prevent blockage of breathing in the three major areas where it can occur: the palate, tongue, and nasal regions.  Successful surgical treatment depends on the accurate identification of the factors responsible for obstructive sleep apnea in each person.  A personalized approach is required because there is no single  treatment that works well for everyone.  Because of anatomic variation, consultation with an examination by a sleep surgeon is a critical first step in determining what surgical options are best for each patient.  In some cases, examination during sedation may be recommended in order to guide the selection of procedures.  Patients will be counseled about risks and benefits as well as the typical recovery course after surgery. Surgery is typically not a cure for a person s DANIELLA.  However, surgery will often significantly improve one s DANIELLA severity (termed  success rate ).  Even in the absence of a cure, surgery will decrease the cardiovascular risk associated with OSA7; improve overall quality of life8 (sleepiness, functionality, sleep quality, etc).      Palate Procedures:  Patients with DANIELLA often have narrowing of their airway in the region of their tonsils and uvula.  The goals of palate procedures are to widen the airway in this region as well as to help the tissues resist collapse.  Modern palate procedure techniques focus on tissue conservation and soft tissue rearrangement, rather than tissue removal.  Often the uvula is preserved in this procedure. Residual sleep apnea is common in patient after pharyngoplasty with an average reduction in sleep apnea events of 33%2.      Tongue Procedures:  ExamWhile patients are awake, the muscles that surround the throat are active and keep this region open for breathing. These muscles relax during sleep, allowing the tongue and other structures to collapse and block breathing.  There are several different tongue procedures available.  Selection of a tongue base procedure depends on characteristics seen on physical exam.  Generally, procedures are aimed at removing bulky tissues in this area or preventing the back of the tongue from falling back during sleep.  Success rates for tongue surgery range from 50-62%3.    Hypoglossal Nerve Stimulation:  Hypoglossal nerve  stimulation has recently received approval from the [place] Food and Drug Administration for the treatment of obstructive sleep apnea.  This is based on research showing that the system was safe and effective in treating sleep apnea6.  Results showed that the median AHI score decreased 68%, from 29.3 to 9.0. This therapy uses an implant system that senses breathing patterns and delivers mild stimulation to airway muscles, which keeps the airway open during sleep.  The system consists of three fully implanted components: a small generator (similar in size to a pacemaker), a breathing sensor, and a stimulation lead.  Using a small handheld remote, a patient turns the therapy on before bed and off upon awakening.    Candidates for this device must be greater than 22 years of age, have moderate to severe DANIELLA (AHI between 20-65), BMI less than 32, have tried CPAP/oral appliance without success, and have appropriate upper airway anatomy (determined by a sleep endoscopy performed by Dr. Slaughter).    Hypoglossal Nerve Stimulation Pathway:    The sleep surgeon s office will work with the patient through the insurance prior-authorization process (including communications and appeals).    Nasal Procedures:  Nasal obstruction can interfere with nasal breathing during the day and night.  Studies have shown that relief of nasal obstruction can improve the ability of some patients to tolerate positive airway pressure therapy for obstructive sleep apnea1.  Treatment options include medications such as nasal saline, topical corticosteroid and antihistamine sprays, and oral medications such as antihistamines or decongestants. Non-surgical treatments can include external nasal dilators for selected patients. If these are not successful by themselves, surgery can improve the nasal airway either alone or in combination with these other options.      Combination Procedures:  Combination of surgical procedures and other treatments may  be recommended, particularly if patients have more than one area of narrowing or persistent positional disease.  The success rate of combination surgery ranges from 66-80%2,3.    References  1. Reyes PAYNE. The Role of the Nose in Snoring and Obstructive Sleep Apnoea: An Update.  Eur Arch Otorhinolaryngol. 2011; 268: 1365-73.  2.  Marga SM; Remington JA; Manoj JR; Pallanch JF; Maxwell MB; Tommy SG; Leif CADENA. Surgical modifications of the upper airway for obstructive sleep apnea in adults: a systematic review and meta-analysis. SLEEP 2010;33(10):6816-1548. Octavia ECHAVARRIA. Hypopharyngeal surgery in obstructive sleep apnea: an evidence-based medicine review.  Arch Otolaryngol Head Neck Surg. 2006 Feb;132(2):206-13.  3. José YH1, Michael Y, Leobardo CASIMIRO. The efficacy of anatomically based multilevel surgery for obstructive sleep apnea. Otolaryngol Head Neck Surg. 2003 Oct;129(4):327-35.  4. Octavia ECHAVARRIA, Goldberg A. Hypopharyngeal Surgery in Obstructive Sleep Apnea: An Evidence-Based Medicine Review. Arch Otolaryngol Head Neck Surg. 2006 Feb;132(2):206-13.  5. Cristina LÓPEZ et al. Upper-Airway Stimulation for Obstructive Sleep Apnea.  N Engl J Med. 2014 Jan 9;370(2):139-49.  6. Nir Y et al. Increased Incidence of Cardiovascular Disease in Middle-aged Men with Obstructive Sleep Apnea. Am J Respir Crit Care Med; 2002 166: 159-165  7. Jefferson EM et al. Studying Life Effects and Effectiveness of Palatopharyngoplasty (SLEEP) study: Subjective Outcomes of Isolated Uvulopalatopharyngoplasty. Otolaryngol Head Neck Surg. 2011; 144: 623-631.        WHAT IF I ONLY HAVE SNORING?      Mandibular advancement devices, lateral sleep positioning, long-term weight loss and treatment of nasal allergies has been shown to improve snoring.    Exercising tongue muscles with a game (https://www.ncbi.nlm.nih.gov/pubmed/51696416) or stimulating the tongue during the day with a device (https://doi.org/10.[facility]0/aur21239642) have t snoring    Remember to Drive  Safe... Drive Alive     Sleep health profoundly affects your health, mood, and your safety.  Thirty three percent of the population (one in three of us) is not getting enough sleep and many have a sleep disorder. Not getting enough sleep or having an untreated / undertreated sleep condition may make us sleepy without even knowing it. In fact, our driving could be dramatically impaired due to our sleep health. As your provider, here are some things I would like you to know about driving:     Here are some warning signs for impairment and dangerous drowsy driving:              -Having been awake more than 16 hours               -Looking tired               -Eyelid drooping              -Head nodding (it could be too late at this point)              -Driving for more than 30 minutes     Some things you could do to make the driving safer if you are experiencing some drowsiness:              -Stop driving and rest              -Call for transportation              -Make sure your sleep disorder is adequately treated     Some things that have been shown NOT to work when experiencing drowsiness while driving:              -Turning on the radio              -Opening windows              -Eating any  distracting  /  entertaining  foods (e.g., sunflower seeds, candy, or any other)              -Talking on the phone      Your decision may not only impact your life, but also the life of others. Please, remember to drive safe for yourself and all of us.

## 2022-01-05 NOTE — PROGRESS NOTES
Lenard is a 26 year old who is being evaluated via a billable video visit.      How would you like to obtain your AVS? MyChart  If the video visit is dropped, the invitation should be resent by: Text to cell phone: [phone]  Will anyone else be joining your video visit? No      Video Start Time: 12:43 PM  Video-Visit Details    Type of service:  Video Visit    Video End Time:1:29 PM    Originating Location (pt. Location): Home    Distant Location (provider location):  [facility]     Platform used 26-year-old for Video Visit: Damian     Chief complaint: Referred by Dr. Gage for evaluation of snoring, elevated blood pressure measurements    History of Present Illness: 26-year-old gentleman with long history of snoring, obesity, elevated blood pressure measurements without diagnosis of hypertension.  He had tonsillectomy as a child but he is not sure the indication.  Otherwise he reports  that he has snored for very long time.  He has been told by multiple people about loud snoring and it is often disruptive to anybody else in the room.  Snoring can be heard outside the room.  No one has offered that they have observed apneas.  However, he has not actually asked.  He has woken himself up with a snort on occasion.  He denies waking up with headaches or jaw pain.      Typical bedtime is between 11 om and 1 AM with rise time between 7 and 9 AM.  He is not taking any sleep aids at this time.  He is not taking naps.  He is little fatigue during the day but takes stimulants for ADHD.  If he tries to lay down he notes he cannot fall asleep.  He has been taking the stimulants for at least 15 years.  He also drinks 1 to 2 cups of coffee in the morning.  His weight has been stable.      No history of sleepwalking as an adult.  He did do some sleepwalking as a child.  He also has been told about sleep talking.  Otherwise, no restless legs, dream enactment behavior nightmares.  He recently is  recovering from an asthma exacerbation.  At baseline he does not need to routinely use albuterol inhaler at night.    Lamoni Sleepiness Scale   Sitting and reading: Would never doze   Watching TV: Would never doze   Sitting, inactive in a public place (e.g. a theatre or a meeting): Would never doze   As a passenger in a car for an hour without a break: Would never doze   Lying down to rest in the afternoon when circumstances permit: Would never doze   Sitting and talking to someone: Would never doze   Sitting quietly after a lunch without alcohol: Would never doze   In a car, while stopped for a few minutes in traffic: Would never doze   Total score - Lamoni: 0   (Less than 10 normal)    Insomnia Severity Scale  KENNETH Total Score: 10  Total score categories:  0-7 = No clinically significant insomnia   8-14 = Subthreshold insomnia   15-21 = Clinical insomnia (moderate severity)  22-28 = Clinical insomnia (severe)    STOP-BANG  Loud Snore   1  Excessively Tired/Sleepy   0  Observed apnea   0  Hypertension   0-1  BMI> 35 kg/m2   0  Age >50   0  Neck >16 in/40cm   0  Male Gender   1  Total =   2-3  (0-2 low, 3-4 intermediate, 5-8 high risk of DANIELLA)      Past Medical History:   Diagnosis Date     ADHD      Asthma      Dyslipidemia      Obesity        Allergies   Allergen Reactions     Seasonal Allergies        Current Outpatient Medications   Medication     albuterol (PROAIR HFA/PROVENTIL HFA/VENTOLIN HFA) 108 (90 Base) MCG/ACT inhaler     FLOVENT  MCG/ACT inhaler     methylphenidate (CONCERTA) 54 MG CR tablet     sildenafil (REVATIO) 20 MG tablet     No current facility-administered medications for this visit.       Social History     Socioeconomic History     Marital status: Single     Spouse name: Not on file     Number of children: Not on file     Years of education: Not on file     Highest education level: Not on file   Occupational History     Not on file   Tobacco Use     Smoking status: Former Smoker      "Types: Other     Smokeless tobacco: Never Used     Tobacco comment: \"recreational smoker--marijuana\"    Substance and Sexual Activity     Alcohol use: Not on file     Drug use: Not on file     Sexual activity: Not on file   Other Topics Concern     Not on file   Social History Narrative     Not on file     Social Determinants of Health     Financial Resource Strain: Not on file   Food Insecurity: Not on file   Transportation Needs: Not on file   Physical Activity: Not on file   Stress: Not on file   Social Connections: Not on file   Intimate Partner Violence: Not on file   Housing Stability: Not on file       Family History   Problem Relation Age of Onset     Diabetes Maternal Grandmother      Liver Disease No family hx of      Colon Cancer No family hx of        Review of Systems:   Answers for HPI/ROS submitted by the patient on 1/5/2022  General Symptoms: No  Skin Symptoms: No  HENT Symptoms: Yes  EYE SYMPTOMS: No  HEART SYMPTOMS: No  LUNG SYMPTOMS: No  INTESTINAL SYMPTOMS: No  URINARY SYMPTOMS: No  REPRODUCTIVE SYMPTOMS: No  SKELETAL SYMPTOMS: No  BLOOD SYMPTOMS: No  NERVOUS SYSTEM SYMPTOMS: No  MENTAL HEALTH SYMPTOMS: No  Ear pain: No  Ear discharge: No  Hearing loss: No  Tinnitus: No  Nosebleeds: No  Congestion: Yes  Sinus pain: No  Trouble swallowing: No   Voice hoarseness: No  Mouth sores: No  Sore throat: Yes  Tooth pain: No  Gum tenderness: No  Bleeding gums: No  Change in taste: No  Change in sense of smell: No  Dry mouth: No  Hearing aid used: No  Neck lump: No        EXAM:  Ht 1.727 m (5' 8\")   Wt 99.8 kg (220 lb)   BMI 33.45 kg/m    GENERAL: Healthy, alert and no distress  EYES: Eyes grossly normal to inspection.  No discharge or erythema, or obvious scleral/conjunctival abnormalities.  RESP: No audible wheeze, cough, or visible cyanosis.  No visible retractions or increased work of breathing.    SKIN: Visible skin clear. No significant rash, abnormal pigmentation or lesions.  NEURO: Cranial nerves " grossly intact.  Mentation and speech appropriate for age.  PSYCH: Mentation appears normal, affect normal, judgement and insight intact, normal speech and appearance well-groomed.     No results found for: TSH      ASSESSMENT:  26-year-old gentleman with history of ADHD on stimulants, loud disruptive snoring with snort arousals, daytime fatigue but not drowsiness.  Stimulants could be masking some drowsiness associated with untreated sleep disordered breathing.  It also may be challenge to identify whether fatigue is related to any sleep disruption associated with arousals from medication versus sleep disordered breathing, therefore in lab sleep study would be beneficial.    PLAN:  Recommended in lab PSG for better recognition of arousals and their etiology.  We reviewed treatment options of obstructive sleep apnea.  Patient would find appliances more acceptable.  We reviewed the pathophysiology of obstructive sleep apnea and the importance of treating it should he have it.  Also reviewed the potential benefits of weight loss.  If no sleep apnea identified treatments for snoring were also reviewed.  Please see patient instructions for further details of counseling provided.  On the day of the sleep study recommended patient take less stimulants if possible and a prescription was generated for a sleep aid to take at the lab if needed.  Patient prefers video visit for follow-up of results.      49 minutes spent on the date of the encounter doing chart review, history and exam, documentation and further activities per the note    Marleni Zuniga M.D.  Pulmonary/Critical Care/Sleep Medicine    [facility]   [address], [address]   [address] [address]   Appointments: [phone]    The above note was dictated using voice recognition software and may include typographical errors. Please contact the author for any  clarifications.

## 2022-01-09 NOTE — PROGRESS NOTES
HPI:    Last visit with me 10/18/2021 and additional details in that note. Overall doing well. He has seen Sleep Medicine for snoring. Still mildly elevated liver tests and he has seen RHEA Petersen in the past. No other HEENT, cardiopulmonary, abdominal, , neurological, systemic, psychiatric, lymphatic, endocrine complaints.     Past Medical History:   Diagnosis Date     ADHD      Asthma      Dyslipidemia      Obesity      Past Surgical History:   Procedure Laterality Date     TONSILLECTOMY Bilateral     childhood     PE:    Vitals noted, gen, nad, cooperative, alert, neck supple nl rom, lungs with good air movement, RRR, S1, S2, no MRG, abdomen, no acute findings. Grossly normal neurological exam.     A/P:    1.  Immunizations; Pfizer COVID vaccine x 3. Influenza vaccine 10/18/2021. Tdap 7/13/2015  2. Seen Dr. Zuniga, Sleep Medicine 1/5/2022 for snoring.   3. Lipids 10/18/2021 with  and HDL 55. These labs were non-fasting. Last year LDL was 125. He continues to exercise w/o problems.   4. Labs from 10/18/2021 Still elevated  (was 160 7/21/2020). He was seen by Dr. Yadiel WILKERSON 8/3/2020 for dx of hepatic steatosis and he recommended GI follow up and placed referral 1/9/2022    20 minutes spent on the date of the encounter doing chart review, history and exam, documentation and further activities as noted above

## 2022-01-10 ENCOUNTER — OFFICE VISIT (OUTPATIENT)
Dept: INTERNAL MEDICINE | Facility: CLINIC | Age: 27
End: 2022-01-10
Payer: COMMERCIAL

## 2022-01-10 VITALS
HEIGHT: 68 IN | OXYGEN SATURATION: 95 % | WEIGHT: 233 LBS | BODY MASS INDEX: 35.31 KG/M2 | DIASTOLIC BLOOD PRESSURE: 75 MMHG | RESPIRATION RATE: 16 BRPM | SYSTOLIC BLOOD PRESSURE: 129 MMHG | HEART RATE: 82 BPM

## 2022-01-10 DIAGNOSIS — R74.8 ELEVATED LIVER ENZYMES: Primary | ICD-10-CM

## 2022-01-10 PROCEDURE — 99213 OFFICE O/P EST LOW 20 MIN: CPT | Performed by: INTERNAL MEDICINE

## 2022-01-10 ASSESSMENT — MIFFLIN-ST. JEOR: SCORE: 2016.38

## 2022-01-10 NOTE — NURSING NOTE
"Miguel Angel Hernandez is a 26 year old male patient that presents today in clinic for the following:    Chief Complaint   Patient presents with     RECHECK     Three month follow-up--he reports no new health concerns today.     The patient's allergies and medications were reviewed as noted. A set of vitals were recorded as noted without incident: /75 (BP Location: Right arm, Patient Position: Sitting, Cuff Size: Adult Regular)   Pulse 82   Resp 16   Ht 1.727 m (5' 8\")   Wt 105.7 kg (233 lb)   SpO2 95%   BMI 35.43 kg/m  . The patient was asked if they had any of the following symptoms in the last forty-eight hours: (1) fever or chills, (2) cough, (3) shortness of breath or difficulty breathing, (4) fatigue, (5) muscle or body aches, (6) headache, (7) new loss of taste or smell, (8) sore throat, (9) congestion or runny nose, (10) nausea or vomiting, and (11) diarrhea. Miguel Angel Hernandez denies having any of the following symptoms in the last forty-eight hours: (1) fever or chills, (2) cough, (3) shortness of breath or difficulty breathing, (4) fatigue, (5) muscle or body aches, (6) headache, (7) new loss of taste or smell, (8) sore throat, (9) congestion or runny nose, (10) nausea or vomiting, and (11) diarrhea. The patient does not have any other questions for the provider.    Andrew Canas, EMT at 8:55 AM on 1/10/2022  "

## 2022-01-18 ENCOUNTER — VIRTUAL VISIT (OUTPATIENT)
Dept: GASTROENTEROLOGY | Facility: CLINIC | Age: 27
End: 2022-01-18
Attending: INTERNAL MEDICINE
Payer: COMMERCIAL

## 2022-01-18 DIAGNOSIS — E66.812 OBESITY, CLASS II, BMI 35-39.9: ICD-10-CM

## 2022-01-18 DIAGNOSIS — R74.8 ELEVATED LIVER ENZYMES: ICD-10-CM

## 2022-01-18 DIAGNOSIS — K76.0 NAFLD (NONALCOHOLIC FATTY LIVER DISEASE): Primary | ICD-10-CM

## 2022-01-18 PROCEDURE — 99214 OFFICE O/P EST MOD 30 MIN: CPT | Mod: 95 | Performed by: INTERNAL MEDICINE

## 2022-01-18 PROCEDURE — G0463 HOSPITAL OUTPT CLINIC VISIT: HCPCS | Mod: PN,RTG | Performed by: INTERNAL MEDICINE

## 2022-01-18 ASSESSMENT — PAIN SCALES - GENERAL: PAINLEVEL: NO PAIN (0)

## 2022-01-18 NOTE — LETTER
"  1/18/2022     RE: Miguel Angel Hernandez  [address] [address]    Dear Colleague,    Thank you for referring your patient, Miguel Angel Hernandez, to the [facility] [place]. Please see a copy of my visit note below.    Lenard is a 26 year old who is being evaluated via a billable video visit.      How would you like to obtain your AVS? MyChart  If the video visit is dropped, the invitation should be resent by: Send to e-mail at: [email or web address]  Will anyone else be joining your video visit? No      Video Start Time: 1109  Video-Visit Details    Type of service:  Video Visit    Video End Time:11:22 AM    Originating Location (pt. Location): Home    Distant Location (provider location):  [facility] [place]     Platform used for Video Visit: Couple   ______________________________________________________________________________    [facility] Hepatology    Follow-up Visit    Follow-up visit for NAFLD    Subjective:  26 year old male    Last visit Aug 2020.  Abdominal ultrasound in Aug 2020 showed increased echogenicity.  Patient has been started on guanfacine for ADHD since last visit.    Patient is well today.  He has no symptoms related to liver disease.    Patient denies jaundice, lower extremity edema, abdominal distension, lethargy or confusion.    Patient denies melena, hematemesis or hematochezia.    Patient denies fevers, sweats or chills.  Patient is fully vaccinated and boosted against COVID-19.    Weight is stable.  Patient currently weighs 230lbs and is 5'8\" in height.  (BMI= 35).  Appetite is good.    Patient does not drink alcohol regularly.      Medical hx Surgical hx   Past Medical History:   Diagnosis Date     ADHD      Asthma      Dyslipidemia      Obesity       Past Surgical History:   Procedure Laterality Date     TONSILLECTOMY Bilateral     childhood          Medications  Prior to Admission medications    Medication Sig " Start Date End Date Taking? Authorizing Provider   albuterol (PROAIR HFA/PROVENTIL HFA/VENTOLIN HFA) 108 (90 Base) MCG/ACT inhaler Inhale 1-2 puffs into the lungs 10/5/18  Yes Reported, Patient   FLOVENT  MCG/ACT inhaler INHALE 2 PUFFS TWICE A DAY FOR TWO WEEKS AT ONSET OF URI. RINSE AFTER USE. 3/17/20  Yes Reported, Patient   guanFACINE (TENEX) 1 MG tablet TAKE 1 TABLET BY MOUTH EVERY DAY IN THE MORNING 12/30/21  Yes Reported, Patient   Methylphenidate HCl ER 36 MG 24H tablet Take 72 mg by mouth   Yes Reported, Patient   sildenafil (REVATIO) 20 MG tablet Take 1-2 tablets (20-40 mg) by mouth daily as needed (take 1 hour before intercourse as needed.) May increase up to 100mg dose ( 5 tablets) if needed.  Use lowest effective dose.  Patient not taking: Reported on 8/3/2020 7/16/20   Celestino De Oliveira MD       Allergies  Allergies   Allergen Reactions     Seasonal Allergies        Review of systems  A 10-point review of systems was negative    Examination  Gen- well, NAD, A+Ox3, normal color  Eye- EOMI, no scleral icterus  Skin- no rash or jaundice  Psych- normal mood    Laboratory  Lab Results   Component Value Date     10/18/2021     06/16/2020    POTASSIUM 3.8 10/18/2021    POTASSIUM 4.3 06/16/2020    CHLORIDE 109 10/18/2021    CHLORIDE 110 06/16/2020    CO2 29 10/18/2021    CO2 28 06/16/2020    BUN 18 10/18/2021    BUN 16 06/16/2020    CR 1.04 10/18/2021    CR 1.04 06/16/2020       Lab Results   Component Value Date    BILITOTAL 0.4 10/18/2021    BILITOTAL 0.4 07/21/2020     10/18/2021     07/21/2020    AST 44 10/18/2021    AST 88 07/21/2020    ALKPHOS 77 10/18/2021    ALKPHOS 84 07/21/2020       Lab Results   Component Value Date    ALBUMIN 4.4 10/18/2021    ALBUMIN 4.1 07/21/2020    PROTTOTAL 7.5 10/18/2021    PROTTOTAL 7.4 07/21/2020        Lab Results   Component Value Date    WBC 7.6 10/18/2021    WBC 6.6 06/16/2020    HGB 16.4 10/18/2021    HGB 16.0 06/16/2020    MCV 85  10/18/2021    MCV 88 06/16/2020     10/18/2021     06/16/2020       No results found for: INR    Radiology  Abd U/S Aug 2020 personally reviewed- fatty infiltration    Assessment  26 year old male who presents for follow-up of abnormal liver function tests secondary to non-alcoholic fatty liver disease.  Will need TTG Ab and iron studies to complete serological evaluation of abnormal liver function tests.  Will refer to medical weight management clinic for multidisciplinary approach to weight loss to prevent long-term development of cirrhosis and other complications of metabolic syndrome such as diabetes.    Plan  1.  Check LFTs  2.  Check TTG Ab, iron studies  3.  Weight management clinic referral  4.  Follow-up in 6 months    Lisandro Cotto MD  Hepatology  [facility]

## 2022-01-18 NOTE — PROGRESS NOTES
"Lenard is a 26 year old who is being evaluated via a billable video visit.      How would you like to obtain your AVS? MyChart  If the video visit is dropped, the invitation should be resent by: Send to e-mail at: [email or web address]  Will anyone else be joining your video visit? No      Video Start Time: 1109  Video-Visit Details    Type of service:  Video Visit    Video End Time:11:22 AM    Originating Location (pt. Location): Home    Distant Location (provider location):  [facility] [place]     Platform used for Video Visit: Real [employer]   ______________________________________________________________________________    [facility] Hepatology    Follow-up Visit    Follow-up visit for NAFLD    Subjective:  26 year old male    Last visit Aug 2020.  Abdominal ultrasound in Aug 2020 showed increased echogenicity.  Patient has been started on guanfacine for ADHD since last visit.    Patient is well today.  He has no symptoms related to liver disease.    Patient denies jaundice, lower extremity edema, abdominal distension, lethargy or confusion.    Patient denies melena, hematemesis or hematochezia.    Patient denies fevers, sweats or chills.  Patient is fully vaccinated and boosted against COVID-19.    Weight is stable.  Patient currently weighs 230lbs and is 5'8\" in height.  (BMI= 35).  Appetite is good.    Patient does not drink alcohol regularly.      Medical hx Surgical hx   Past Medical History:   Diagnosis Date     ADHD      Asthma      Dyslipidemia      Obesity       Past Surgical History:   Procedure Laterality Date     TONSILLECTOMY Bilateral     childhood          Medications  Prior to Admission medications    Medication Sig Start Date End Date Taking? Authorizing Provider   albuterol (PROAIR HFA/PROVENTIL HFA/VENTOLIN HFA) 108 (90 Base) MCG/ACT inhaler Inhale 1-2 puffs into the lungs 10/5/18  Yes Reported, Patient   FLOVENT  MCG/ACT inhaler INHALE 2 PUFFS TWICE A DAY FOR TWO " WEEKS AT ONSET OF URI. RINSE AFTER USE. 3/17/20  Yes Reported, Patient   guanFACINE (TENEX) 1 MG tablet TAKE 1 TABLET BY MOUTH EVERY DAY IN THE MORNING 12/30/21  Yes Reported, Patient   Methylphenidate HCl ER 36 MG 24H tablet Take 72 mg by mouth   Yes Reported, Patient   sildenafil (REVATIO) 20 MG tablet Take 1-2 tablets (20-40 mg) by mouth daily as needed (take 1 hour before intercourse as needed.) May increase up to 100mg dose ( 5 tablets) if needed.  Use lowest effective dose.  Patient not taking: Reported on 8/3/2020 7/16/20   Celestino De Oliveira MD       Allergies  Allergies   Allergen Reactions     Seasonal Allergies        Review of systems  A 10-point review of systems was negative    Examination  Gen- well, NAD, A+Ox3, normal color  Eye- EOMI, no scleral icterus  Skin- no rash or jaundice  Psych- normal mood    Laboratory  Lab Results   Component Value Date     10/18/2021     06/16/2020    POTASSIUM 3.8 10/18/2021    POTASSIUM 4.3 06/16/2020    CHLORIDE 109 10/18/2021    CHLORIDE 110 06/16/2020    CO2 29 10/18/2021    CO2 28 06/16/2020    BUN 18 10/18/2021    BUN 16 06/16/2020    CR 1.04 10/18/2021    CR 1.04 06/16/2020       Lab Results   Component Value Date    BILITOTAL 0.4 10/18/2021    BILITOTAL 0.4 07/21/2020     10/18/2021     07/21/2020    AST 44 10/18/2021    AST 88 07/21/2020    ALKPHOS 77 10/18/2021    ALKPHOS 84 07/21/2020       Lab Results   Component Value Date    ALBUMIN 4.4 10/18/2021    ALBUMIN 4.1 07/21/2020    PROTTOTAL 7.5 10/18/2021    PROTTOTAL 7.4 07/21/2020        Lab Results   Component Value Date    WBC 7.6 10/18/2021    WBC 6.6 06/16/2020    HGB 16.4 10/18/2021    HGB 16.0 06/16/2020    MCV 85 10/18/2021    MCV 88 06/16/2020     10/18/2021     06/16/2020       No results found for: INR    Radiology  Abd U/S Aug 2020 personally reviewed- fatty infiltration    Assessment  26 year old male who presents for follow-up of abnormal liver function  tests secondary to non-alcoholic fatty liver disease.  Will need TTG Ab and iron studies to complete serological evaluation of abnormal liver function tests.  Will refer to medical weight management clinic for multidisciplinary approach to weight loss to prevent long-term development of cirrhosis and other complications of metabolic syndrome such as diabetes.    Plan  1.  Check LFTs  2.  Check TTG Ab, iron studies  3.  Weight management clinic referral  4.  Follow-up in 6 months    Lisandro Cotto MD  Hepatology  [facility]

## 2022-01-19 ENCOUNTER — TELEPHONE (OUTPATIENT)
Dept: SLEEP MEDICINE | Facility: CLINIC | Age: 27
End: 2022-01-19
Payer: COMMERCIAL

## 2022-01-19 NOTE — TELEPHONE ENCOUNTER
Reason for call:  Other   Patient called regarding (reason for call): call back  Additional comments: patient calling for sleep study scheduling    Phone number to reach patient:  Home number on file [phone] (home)    Best Time:  Any time    Can we leave a detailed message on this number?  YES    Travel screening: Not Applicable

## 2022-01-25 ENCOUNTER — TELEPHONE (OUTPATIENT)
Dept: GASTROENTEROLOGY | Facility: CLINIC | Age: 27
End: 2022-01-25
Payer: COMMERCIAL

## 2022-01-25 NOTE — TELEPHONE ENCOUNTER
Called [facility] insurance and was told needed to download form to submit prior authorization.  Prior auth submitted and faxed.    [facility]    Phone Message    May a detailed message be left on voicemail: yes     Reason for Call: Other: Pt needs someone from Dr. Cotto's care team to call a # for insurance so they will cover the referral for weight management - they need referring provider (Dr. Cotto) to call this # for requirement verification, phone # [phone], please call Pt with questions, thanks     Action Taken: Other: Hep    Travel Screening: Not Applicable

## 2022-02-10 NOTE — PROGRESS NOTES
"Miguel Angel Hernandez is a 26 year old male who is being evaluated via a billable video visit.      The patient has been notified of following:     \"This video visit will be conducted via a call between you and your physician/provider. We have found that certain health care needs can be provided without the need for an in-person physical exam.  This service lets us provide the care you need with a video conversation.  If a prescription is necessary we can send it directly to your pharmacy.  If lab work is needed we can place an order for that and you can then stop by our lab to have the test done at a later time.    Video visits are billed at different rates depending on your insurance coverage.  Please reach out to your insurance provider with any questions.    If during the course of the call the physician/provider feels a video visit is not appropriate, you will not be charged for this service.\"    Patient has given verbal consent for Video visit? Yes  How would you like to obtain your AVS? MyChart  Will anyone else be joining your video visit? No  {If patient encounters technical issues they should call [phone]      Video-Visit Details    Type of service:  Video Visit    Video Start Time: 9:58 AM   Video End Time: 10:25 AM    Originating Location (pt. Location): Home    Distant Location (provider location):  [facility] [place]     Platform used for Video Visit: MetaCert    During this virtual visit the patient is located in [place], patient verifies this as the location during the entirety of this visit.     New Weight Management Nutrition Consultation    Miguel Angel Hernandez is a 26 year old male presents today for new weight management nutrition consultation.  Patient referred by Dr. Chin on February 14, 2022.    Patient with Co-morbidities of obesity including:  Type II DM no  Renal Failure no  Sleep apnea no  Hypertension no   Dyslipidemia no  Joint pain no  Back pain yes  GERD " "no     H/o prediabetes, fatty liver and asthma    Anthropometrics:  Estimated body mass index is 34.97 kg/m  as calculated from the following:    Height as of an earlier encounter on 2/14/22: 1.727 m (5' 8\").    Weight as of an earlier encounter on 2/14/22: 104.3 kg (230 lb).    Medications for Weight Loss:  None    NUTRITION HISTORY  See MD note for details.  NKFA/intolerances  Previous experiences with diet modification for wt loss: college was able to sustain more structured eating, increased exercise - lost 25-30 lbs. Schedule got really busy afterwards.     Info extracted from questionnaire:   Often skip meals, eat at random times, have no regular eating times. Almost Everyday   Rarely sit down for a meal but snack or graze throughout.  Almost Everyday   Eat extra snacks between meals. A Few Times a Week   Eat most of my food at the end of the day. Almost Everyday     Sometimes wants something convienent, that he doesn't have to prep/cook.  WFH   Exercise after work, then dinner. Long period between lunch and dinner often.     Recent food recall:  Breakfast: 9-10 am Egg, toast, fruit; byerlys across the street egg and cheese croissant   Lunch: skip; sandwich or salad (homemade or )  Dinner: 6-9 pm Good variety, greens, protein (beef chicken fish); sheet pan recipes - chicken/beef + veggies   Snacks: often replaces lunch, and after dinner - lunch meat, gold fish, fruit, sweets  Beverages: coffee, water, sparkling    Alcohol: not very often - couple times   Dining out: [employer] 50/50 for breakfast, couple times for lunch and dinner through out the week.     Pt identified nutritional goals - snack less, especially in the evening.    Physical Activity:  3-5 times per week - snowboarding, yoga, cross country skiing, biking.     Nutrition Prescription  Recommended energy/nutrient modification.    Nutrition Diagnosis  Obesity r/t long history of positive energy balance aeb BMI >30.    Nutrition " "Intervention  Materials/education provided on hypocaloric diet for weight loss. Discussed Volumetric eating to help satiety level on fewer calories; portion control and healthy food choices (Plate Method and Volumetrics handouts), 100 calorie snack choices, meal and snack planning and websites, sample meal plans. Discussed importance of establishing a structured meal pattern. Patient demonstrates understanding.    Expected Engagement: good  Follow-Up Plans: meal/snack planning     Nutrition Goals  1) Reduce night-time snacking  2) Work on consuming 3 structured meal per day    Healthy Recipe Resources:  \"The Volumetrics Eating Plan\" by Aide Briceno, Ph.D.  https://www.[employer]Box [employer].BPG [employer]ks/  www.[employer].BPG Werks  www.[employer].org  Dash Diet Recipes [facility]  www.extension.[facility] - the recipe box  \"Cooking that Counts\" by editors of [facility]  https://www.[facility]/communityculinary/KC_Cookbook_KT_Final_11-6_NoCrops-compressed.pdf  Https://www.choosemyplate.gov/myplatekitchen  https://snaped.fns.usda.gov/recipes-menus - SNAP recipes  https://www.diabetesfoodhub.org/all-recipes.html  https://www.AppCard.BPG [employer]ks/      Avoid snacking if able. If snack is needed use lean protein and/or fruit/vegetable. Examples:   - 2 cup popcorn   - 1 cup mixed berries   - 15 almonds, walnuts, cashews   - carrot/celery sticks and 2 tbsp low-fat ranch   - 1 hard boiled egg   - Part-skim mozzarella cheese stick   - Low-fat, low-sugar greek yogurt with 1/2 cup berries   - Med apple or pear   - sliced bell peppers with 1/2 cup salsa   - 1/2 cup roasted chickpeas   - sliced cucumbers with vinegar    Snack ideas:  - Banana and creamy PB dip (https://www.diabetesfoodhub.org/recipes/sweet-peanut-buttery-dip.html?home-category_id=23)  - Roasted chickpeas (https://www.diabetesfoodhub.org/recipes/roasted-and-spiced-chickpeas.html?home-category_id=23)  - Lemon Raspberry bella seed pudding " (https://www.diabetesfoodPlacelyb.org/recipes/lemon-raspberry- bella-seed-pudding.html?home-category_id=23)  - Black bean hummus with carrot and celery sticks (https://www.diabetes"[employer]Se[employer]".org/recipes/black-bean-hummus.html?home-category_id=23)  - Greek yogurt chocolate mouse (https://www.diabetesfoPedidosYa / PedidosJÃ¡.org/recipes/greek-yogurt-chocolate-mousse.html?home-category_id=23)   - Broccoli Cheese Bites  (https://www.diabetes"[employer]".org/recipes/broccoli-cheese-bites.html?home-category_id=20)  - Chicken Satay with peanut sauce  (https://www.diabetes"[employer]".org/recipes/blueberry-almond-chicken-salad-lettuce-wraps.html?home-category_id=20)  - Deviled Eggs  (https://www.eziCONEX.org/recipes/devilled-eggs.html?home-category_id=20)    The Plate Method:  https://www.cdc.gov/diabetes/images/managing/Diabetes-Manage-Eat-Well-Plate-Graphic_600px.jpg    https://www.cdc.gov/diabetes/managing/eat-well/meal-plan-method.html#plate    Protein Sources   [email or web address]://[email or web address]     Carbohydrates  [email or web address]     Mindful Eating  [email or web address]://[email or web address]     Summary of Volumetrics Eating Plan  [email or web address]     Follow-Up:  1 month, PRN    Time spent with patient: 27 minutes.  Roselyn Morgan RD, LD

## 2022-02-14 ENCOUNTER — VIRTUAL VISIT (OUTPATIENT)
Dept: ENDOCRINOLOGY | Facility: CLINIC | Age: 27
End: 2022-02-14
Payer: COMMERCIAL

## 2022-02-14 VITALS — WEIGHT: 230 LBS | BODY MASS INDEX: 34.86 KG/M2 | HEIGHT: 68 IN

## 2022-02-14 DIAGNOSIS — E66.9 OBESITY: ICD-10-CM

## 2022-02-14 DIAGNOSIS — E66.812 OBESITY, CLASS II, BMI 35-39.9: Primary | ICD-10-CM

## 2022-02-14 DIAGNOSIS — Z71.3 NUTRITIONAL COUNSELING: Primary | ICD-10-CM

## 2022-02-14 PROCEDURE — 99204 OFFICE O/P NEW MOD 45 MIN: CPT | Mod: 95 | Performed by: INTERNAL MEDICINE

## 2022-02-14 PROCEDURE — 97802 MEDICAL NUTRITION INDIV IN: CPT | Mod: 95 | Performed by: DIETITIAN, REGISTERED

## 2022-02-14 ASSESSMENT — PAIN SCALES - GENERAL: PAINLEVEL: NO PAIN (0)

## 2022-02-14 ASSESSMENT — MIFFLIN-ST. JEOR: SCORE: 2002.77

## 2022-02-14 NOTE — PROGRESS NOTES
Lenard is a 26 year old who is being evaluated via a billable video visit.      How would you like to obtain your AVS? MyChart  If the video visit is dropped, the invitation should be resent by: Send to e-mail at: [email or web address]  Will anyone else be joining your video visit? No    Video-Visit Details    Type of service:  Video Visit    Start: 02/14/2022 09:17 am  Stop: 02/14/2022 09:37 am    Originating Location (pt. Location): Home    Distant Location (provider location):  [facility] [place]     Platform used for Video Visit: Nitero

## 2022-02-14 NOTE — LETTER
"2/14/2022       RE: Miguel Angel Hernandez  [address] [address]     Dear Colleague,    Thank you for referring your patient, Miguel Angel Hernandez, to the [facility] [place] [facility]. Please see a copy of my visit note below.    Miguel Angel Hernandez is a 26 year old male who is being evaluated via a billable video visit.      The patient has been notified of following:     \"This video visit will be conducted via a call between you and your physician/provider. We have found that certain health care needs can be provided without the need for an in-person physical exam.  This service lets us provide the care you need with a video conversation.  If a prescription is necessary we can send it directly to your pharmacy.  If lab work is needed we can place an order for that and you can then stop by our lab to have the test done at a later time.    Video visits are billed at different rates depending on your insurance coverage.  Please reach out to your insurance provider with any questions.    If during the course of the call the physician/provider feels a video visit is not appropriate, you will not be charged for this service.\"    Patient has given verbal consent for Video visit? Yes  How would you like to obtain your AVS? MyChart  Will anyone else be joining your video visit? No  {If patient encounters technical issues they should call [phone]      Video-Visit Details    Type of service:  Video Visit    Video Start Time: 9:58 AM   Video End Time: 10:25 AM    Originating Location (pt. Location): Home    Distant Location (provider location):  [facility] [place]     Platform used for Video Visit: I Read Books    During this virtual visit the patient is located in [place], patient verifies this as the location during the entirety of this visit.     New Weight Management Nutrition " "Consultation    Miguel Angel Hernandez is a 26 year old male presents today for new weight management nutrition consultation.  Patient referred by Dr. Chin on February 14, 2022.    Patient with Co-morbidities of obesity including:  Type II DM no  Renal Failure no  Sleep apnea no  Hypertension no   Dyslipidemia no  Joint pain no  Back pain yes  GERD no     H/o prediabetes, fatty liver and asthma    Anthropometrics:  Estimated body mass index is 34.97 kg/m  as calculated from the following:    Height as of an earlier encounter on 2/14/22: 1.727 m (5' 8\").    Weight as of an earlier encounter on 2/14/22: 104.3 kg (230 lb).    Medications for Weight Loss:  None    NUTRITION HISTORY  See MD note for details.  NKFA/intolerances  Previous experiences with diet modification for wt loss: college was able to sustain more structured eating, increased exercise - lost 25-30 lbs. Schedule got really busy afterwards.     Info extracted from questionnaire:   Often skip meals, eat at random times, have no regular eating times. Almost Everyday   Rarely sit down for a meal but snack or graze throughout.  Almost Everyday   Eat extra snacks between meals. A Few Times a Week   Eat most of my food at the end of the day. Almost Everyday     Sometimes wants something convienent, that he doesn't have to prep/cook.  WFH   Exercise after work, then dinner. Long period between lunch and dinner often.     Recent food recall:  Breakfast: 9-10 am Egg, toast, fruit; byerlys across the street egg and cheese croissant   Lunch: skip; sandwich or salad (homemade or )  Dinner: 6-9 pm Good variety, greens, protein (beef chicken fish); sheet pan recipes - chicken/beef + veggies   Snacks: often replaces lunch, and after dinner - lunch meat, gold fish, fruit, sweets  Beverages: coffee, water, sparkling    Alcohol: not very often - couple times   Dining out: [employer] 50/50 for breakfast, couple times for lunch and dinner through out the week.     Pt " "identified nutritional goals - snack less, especially in the evening.    Physical Activity:  3-5 times per week - snowboarding, yoga, cross country skiing, biking.     Nutrition Prescription  Recommended energy/nutrient modification.    Nutrition Diagnosis  Obesity r/t long history of positive energy balance aeb BMI >30.    Nutrition Intervention  Materials/education provided on hypocaloric diet for weight loss. Discussed Volumetric eating to help satiety level on fewer calories; portion control and healthy food choices (Plate Method and Volumetrics handouts), 100 calorie snack choices, meal and snack planning and websites, sample meal plans. Discussed importance of establishing a structured meal pattern. Patient demonstrates understanding.    Expected Engagement: good  Follow-Up Plans: meal/snack planning     Nutrition Goals  1) Reduce night-time snacking  2) Work on consuming 3 structured meal per day    Healthy Recipe Resources:  \"The Volumetrics Eating Plan\" by Aide Briceno, Ph.D.  https://www.[employer].Muut/  www.DZZOM  www.Adduplex.HESIODO  Dash Diet Recipes [facility]  www.extension.[facility] - the recipe box  \"Cooking that Counts\" by editors of [employer]  https://www.[facility]/communityculinary/[facility]_Cookbook_KT_Final_11-6_NoCrops-compressed.pdf  Https://www.choosemyplate.gov/myplatekitchen  https://snaped.fns.usda.gov/recipes-menus - SNAP recipes  https://www.diabetesfoodhub.org/all-recipes.html  https://www.Plura Processing.Muut/      Avoid snacking if able. If snack is needed use lean protein and/or fruit/vegetable. Examples:   - 2 cup popcorn   - 1 cup mixed berries   - 15 almonds, walnuts, cashews   - carrot/celery sticks and 2 tbsp low-fat ranch   - 1 hard boiled egg   - Part-skim mozzarella cheese stick   - Low-fat, low-sugar greek yogurt with 1/2 cup berries   - Med apple or pear   - sliced bell peppers with 1/2 cup salsa   - 1/2 cup roasted chickpeas   - sliced cucumbers with vinegar    Snack " ideas:  - Banana and creamy PB dip (https://www.diabetesfoodGeoLearningb.org/recipes/sweet-peanut-buttery-dip.html?home-category_id=23)  - Roasted chickpeas (https://www.diabetesfoimport2.org/recipes/roasted-and-spiced-chickpeas.html?home-category_id=23)  - Lemon Raspberry bella seed pudding (https://www.diabetesfoodVeebox.org/recipes/lemon-raspberry- bella-seed-pudding.html?home-category_id=23)  - Black bean hummus with carrot and celery sticks (https://www.diabetesfoimport2.org/recipes/black-bean-hummus.html?home-category_id=23)  - Greek yogurt chocolate mouse (https://www.diabetesfoodGeoLearningb.org/recipes/greek-yogurt-chocolate-mousse.html?home-category_id=23)   - Broccoli Cheese Bites  (https://www.diabetesfoodVeebox.org/recipes/broccoli-cheese-bites.html?home-category_id=20)  - Chicken Satay with peanut sauce  (https://www.diabetesfoimport2.org/recipes/blueberry-almond-chicken-salad-lettuce-wraps.html?home-category_id=20)  - Deviled Eggs  (https://www.diabetesfoodVeebox.org/recipes/devilled-eggs.html?home-category_id=20)    The Plate Method:  https://www.cdc.gov/diabetes/images/managing/Diabetes-Manage-Eat-Well-Plate-Graphic_600px.jpg    https://www.cdc.gov/diabetes/managing/eat-well/meal-plan-method.html#plate    Protein Sources   http://Excel Energy/975270.pdf     Carbohydrates  [email or web address]://[email or web address]     Mindful Eating  http://Excel Energy/024742.pdf     Summary of Volumetrics Eating Plan  [email or web address]     Follow-Up:  1 month, PRN    Time spent with patient: 27 minutes.  Roselyn Morgan RD, LD

## 2022-02-14 NOTE — NURSING NOTE
"(   Chief Complaint   Patient presents with     New Patient     New weight management consult.    )    ( Weight: 230 lb )  ( Height: 5' 8\" )  ( BMI (Calculated): 34.97 )  (   )  ( Cumulative weight loss (lbs): 0 )  (   )  (   )  (   )  (   )    ( There is no problem list on file for this patient.   )  (   Current Outpatient Medications   Medication Sig Dispense Refill     albuterol (PROAIR HFA/PROVENTIL HFA/VENTOLIN HFA) 108 (90 Base) MCG/ACT inhaler Inhale 1-2 puffs into the lungs       FLOVENT  MCG/ACT inhaler INHALE 2 PUFFS TWICE A DAY FOR TWO WEEKS AT ONSET OF URI. RINSE AFTER USE.       guanFACINE (TENEX) 1 MG tablet TAKE 1 TABLET BY MOUTH EVERY DAY IN THE MORNING       Methylphenidate HCl ER 36 MG 24H tablet Take 72 mg by mouth       sildenafil (REVATIO) 20 MG tablet Take 1-2 tablets (20-40 mg) by mouth daily as needed (take 1 hour before intercourse as needed.) May increase up to 100mg dose ( 5 tablets) if needed.  Use lowest effective dose. (Patient not taking: Reported on 8/3/2020) 30 tablet 12    )  ( Diabetes Eval:    )    ( Pain Eval:  No Pain (0) )    ( Wound Eval:       )    (   History   Smoking Status     Former Smoker     Types: Other   Smokeless Tobacco     Never Used     Comment: \"recreational smoker--marijuana\"     )    ( Signed By:  Mag Shaffer [facility]; February 14, 2022; 8:57 AM )    "

## 2022-02-14 NOTE — LETTER
2022       RE: Miguel Angel Hernandez  [address] [id number]     Dear Colleague,    Thank you for referring your patient, Miguel Angel Hernandez, to the [facility] [place] at [facility]. Please see a copy of my visit note below.    Lenard is a 26 year old who is being evaluated via a billable video visit.      How would you like to obtain your AVS? MyChart  If the video visit is dropped, the invitation should be resent by: Send to e-mail at: [email or web address]  Will anyone else be joining your video visit? No    Video-Visit Details    Type of service:  Video Visit    Start: 2022 09:17 am  Stop: 2022 09:37 am    Originating Location (pt. Location): Home    Distant Location (provider location):  [facility] [place]     Platform used for Video Visit: [facility] Weight Management Consult    PATIENT:  Miguel Angel Hernandez  MRN:         [id number]  :         1995  SUZANNE:         2022    Dear Layo Gage MD,    I had the pleasure of seeing your patient, Miguel Angel Hernandez.  Full intake/assessment done to determine barriers to weight loss success and develop a treatment plan.  Miguel Angel Hernandez is a 26 year old male interested in treatment of medical problems associated with weight.  His weight today is 230 lbs 0 oz, Body mass index is 34.97 kg/m ., and he has the following co-morbidities:     2022   I have the following health issues associated with obesity: Pre-Diabetes, Asthma   I have the following symptoms associated with obesity: Back Pain       Patient Goals 2022   I am interested in having a healthier weight to diminish current health problems: Yes   I am interested in having a healthier weight in order to prevent future health problems: Yes   I am interested in having a healthier weight in order to have a future surgery: No       Referring  "Provider 2/11/2022   Please name the provider who referred you to Medical Weight Management.  If you do not know, please answer: \"I Don't Know\". Dr Cotto       Wt Readings from Last 4 Encounters:   02/14/22 104.3 kg (230 lb)   01/10/22 105.7 kg (233 lb)   01/05/22 99.8 kg (220 lb)   10/18/21 108 kg (238 lb 3.2 oz)       Weight History 2/11/2022   How concerned are you about your weight? Somewhat Concerned   Would you describe your weight gain as gradual? No   I became overweight: As a Teenager   The following factors have contributed to my weight gain:  Change in Schedule, Eating Wrong Types of Food, Eating Too Much, Genetic (Runs in the Family), Stress   I have tried the following methods to lose weight: Watching Portions or Calories, Exercise   My lowest weight since age 19 was: 200   My highest weight since age 19 was: 245   The most weight I have ever lost was: (lbs) 25   I have the following family history of obesity/being overweight:  My mother is overweight   Has anyone in your family had weight loss surgery? No   How has your weight changed over the last year?  Gained   How many pounds? 8       Diet Recall 2/11/2022   Glass juice/day 0   Glass milk/day 0   Glass sugary drink/day 0   How many cans/bottles of sugar pop/soda/tea/sports drinks do you drink in a day? 0   Diet drink/day 0   Alcohol 2-4 Times a Month   Drinks/day 1 or 2       Eating Habits 2/11/2022   Generally, my meals include foods like these: bread, pasta, rice, potatoes, corn, crackers, sweet dessert, pop, or juice. A Few Times a Week   Generally, my meals include foods like these: fried meats, brats, burgers, french fries, pizza, cheese, chips, or ice cream. A Few Times a Week   Eat fast food (like [employer], [employer], Taco Bell). Less Than Weekly   Eat at a buffet or sit-down restaurant. Once a Week   Eat most of my meals in front of the TV or computer. A Few Times a Week   Often skip meals, eat at random times, have no regular eating " times. Almost Everyday   Rarely sit down for a meal but snack or graze throughout.  Almost Everyday   Eat extra snacks between meals. A Few Times a Week   Eat most of my food at the end of the day. Almost Everyday   Eat in the middle of the night or wake up at night to eat. Never   Eat extra snacks to prevent or correct low blood sugar. Never   Eat to prevent acid reflux or stomach pain. Never   Worry about not having enough food to eat. Never   Have you been to the food shelf at least a few times this year? No   I eat when I am depressed. Less Than Weekly   I eat when I am stressed. Less Than Weekly   I eat when I am bored. Less Than Weekly   I eat when I am anxious. Less Than Weekly   I eat when I am happy or as a reward. Less Than Weekly   I feel hungry all the time even if I just have eaten. Never   Feeling full is important to me. Less Than Weekly   I finish all the food on my plate even if I am already full. Less Than Weekly   I can't resist eating delicious food or walk past the good food/smell. Never   I eat/snack without noticing that I am eating. Never   I eat when I am preparing the meal. Less Than Weekly   I eat more than usual when I see others eating. Never   I have trouble not eating sweets, ice cream, cookies, or chips if they are around the house. A Few Times a Week   I think about food all day. Never       Activity/Exercise History 2/11/2022   How much of a typical 12 hour day do you spend sitting? Most of the Day   How much of a typical 12 hour day do you spend lying down? Less Than Half the Day   How much of a typical day do you spend walking/standing? Half the Day   How many hours (not including work) do you spend on the TV/Video Games/Computer/Tablet/Phone? 6 Hours or More   How many times a week are you active for the purpose of exercise? 4-5 TImes a Week   What keeps you from being more active? Lack of Time   How many total minutes do you spend doing some activity for the purpose of  exercising when you exercise? More Than 30 Minutes       PAST MEDICAL HISTORY:  Past Medical History:   Diagnosis Date     ADHD      Asthma      Dyslipidemia      Obesity        Work/Social History Reviewed With Patient 2/11/2022   My employment status is: Full-Time   My job is:    How much of your job is spent on the computer or phone? 100%   How many hours do you spend commuting to work daily?  0   What is your marital status? Single   If in a relationship, is your significant other overweight? No   Do you have children? No   If you have children, are they overweight? No   Who do you live with?  Alone   Are they supportive of your health goals? Yes   Who does the food shopping?  Me       Mental Health History Reviewed With Patient 2/11/2022   Have you ever been physically or sexually abused? No   If yes, do you feel that the abuse is affecting your weight? N/A   If yes, would you like to talk to a counselor about the abuse? N/A   How often in the past 2 weeks have you felt little interest or pleasure in doing things? Not at all   Over the past 2 weeks how often have you felt down, depressed, or hopeless? Not at all       Sleep History Reviewed With Patient 2/11/2022   How many hours do you sleep at night? 7   Do you think that you snore loudly or has anybody ever heard you snore loudly (louder than talking or so loud it can be heard behind a shut door)? Yes   Has anyone seen or heard you stop breathing during your sleep? No   Do you often feel tired, fatigued, or sleepy during the day? No   Do you have a TV/Computer in your bedroom? No       MEDICATIONS:   Current Outpatient Medications   Medication Sig Dispense Refill     albuterol (PROAIR HFA/PROVENTIL HFA/VENTOLIN HFA) 108 (90 Base) MCG/ACT inhaler Inhale 1-2 puffs into the lungs       FLOVENT  MCG/ACT inhaler INHALE 2 PUFFS TWICE A DAY FOR TWO WEEKS AT ONSET OF URI. RINSE AFTER USE.       guanFACINE (TENEX) 1 MG tablet TAKE 1  "TABLET BY MOUTH EVERY DAY IN THE MORNING       Methylphenidate HCl ER 36 MG 24H tablet Take 72 mg by mouth       sildenafil (REVATIO) 20 MG tablet Take 1-2 tablets (20-40 mg) by mouth daily as needed (take 1 hour before intercourse as needed.) May increase up to 100mg dose ( 5 tablets) if needed.  Use lowest effective dose. (Patient not taking: Reported on 8/3/2020) 30 tablet 12       ALLERGIES:   Allergies   Allergen Reactions     Seasonal Allergies        PHYSICAL EXAM:  Ht 1.727 m (5' 8\")   Wt 104.3 kg (230 lb)   BMI 34.97 kg/m     A & O x 3  HEENT: NCAT, mucous membranes moist  Respirations unlabored  Location of obesity: Central Obesity    ASSESSMENT:  Miguel Angel is a patient with mature onset obesity without significant element of familial/genetic influence and with current health consequences. He does need aggressive weight loss plan due to Fatty Liver disease, Pre-Diabetes, Asthma.  Has tried to lose weight by exercising. Has trouble sticking to diets    Miguel Angel Hernandez eats a high carb diet, eats a high fat diet, eats fast food once or more per week and has a disorganized meal pattern. Breakfast, snacking at lunch and between lunch and dinner and then after dinner. Sometimes healthy, sometimes processed.    His problem is complicated by poor lifestyle choices    His ability to lose weight is impacted by lack of confidence and lack of education on nutrition and dietary needs.    PLAN:    Dietician visit of education    Programmatic/Healthy Living  Ancillary testing:  N/A.  Food Plan:  Volumetrics and High protein/low carbohydrate.  Activity Plan:  Activity journal.  Supplementary:   N/A.    Medication:  Deferred    RTC:    12 weeks.  I spent 45 minutes with this patient face to face and explained the conditions and plans (more than 50% of time was counseling/coordination of weight management).    Sincerely,    Stefan Chin MD      "

## 2022-02-14 NOTE — PROGRESS NOTES
"    New Medical Weight Management Consult    PATIENT:  Miguel Angel Hernandez  MRN:         [id number]  :         1995  SUZANNE:         2022    Dear Layo Gage MD,    I had the pleasure of seeing your patient, Miguel Angel Hernandez.  Full intake/assessment done to determine barriers to weight loss success and develop a treatment plan.  Miguel Angel Hernandez is a 26 year old male interested in treatment of medical problems associated with weight.  His weight today is 230 lbs 0 oz, Body mass index is 34.97 kg/m ., and he has the following co-morbidities:     2022   I have the following health issues associated with obesity: Pre-Diabetes, Asthma   I have the following symptoms associated with obesity: Back Pain       Patient Goals 2022   I am interested in having a healthier weight to diminish current health problems: Yes   I am interested in having a healthier weight in order to prevent future health problems: Yes   I am interested in having a healthier weight in order to have a future surgery: No       Referring Provider 2022   Please name the provider who referred you to Medical Weight Management.  If you do not know, please answer: \"I Don't Know\". Dr Cotto       Wt Readings from Last 4 Encounters:   22 104.3 kg (230 lb)   01/10/22 105.7 kg (233 lb)   22 99.8 kg (220 lb)   10/18/21 108 kg (238 lb 3.2 oz)       Weight History 2022   How concerned are you about your weight? Somewhat Concerned   Would you describe your weight gain as gradual? No   I became overweight: As a Teenager   The following factors have contributed to my weight gain:  Change in Schedule, Eating Wrong Types of Food, Eating Too Much, Genetic (Runs in the Family), Stress   I have tried the following methods to lose weight: Watching Portions or Calories, Exercise   My lowest weight since age 19 was: 200   My highest weight since age 19 was: 245   The most weight I have ever lost was: (lbs) 25   I have the following " family history of obesity/being overweight:  My mother is overweight   Has anyone in your family had weight loss surgery? No   How has your weight changed over the last year?  Gained   How many pounds? 8       Diet Recall 2/11/2022   Glass juice/day 0   Glass milk/day 0   Glass sugary drink/day 0   How many cans/bottles of sugar pop/soda/tea/sports drinks do you drink in a day? 0   Diet drink/day 0   Alcohol 2-4 Times a Month   Drinks/day 1 or 2       Eating Habits 2/11/2022   Generally, my meals include foods like these: bread, pasta, rice, potatoes, corn, crackers, sweet dessert, pop, or juice. A Few Times a Week   Generally, my meals include foods like these: fried meats, brats, burgers, french fries, pizza, cheese, chips, or ice cream. A Few Times a Week   Eat fast food (like [employer]onalds, [employer], Taco Bell). Less Than Weekly   Eat at a buffet or sit-down restaurant. Once a Week   Eat most of my meals in front of the TV or computer. A Few Times a Week   Often skip meals, eat at random times, have no regular eating times. Almost Everyday   Rarely sit down for a meal but snack or graze throughout.  Almost Everyday   Eat extra snacks between meals. A Few Times a Week   Eat most of my food at the end of the day. Almost Everyday   Eat in the middle of the night or wake up at night to eat. Never   Eat extra snacks to prevent or correct low blood sugar. Never   Eat to prevent acid reflux or stomach pain. Never   Worry about not having enough food to eat. Never   Have you been to the food shelf at least a few times this year? No   I eat when I am depressed. Less Than Weekly   I eat when I am stressed. Less Than Weekly   I eat when I am bored. Less Than Weekly   I eat when I am anxious. Less Than Weekly   I eat when I am happy or as a reward. Less Than Weekly   I feel hungry all the time even if I just have eaten. Never   Feeling full is important to me. Less Than Weekly   I finish all the food on my plate even if I am  already full. Less Than Weekly   I can't resist eating delicious food or walk past the good food/smell. Never   I eat/snack without noticing that I am eating. Never   I eat when I am preparing the meal. Less Than Weekly   I eat more than usual when I see others eating. Never   I have trouble not eating sweets, ice cream, cookies, or chips if they are around the house. A Few Times a Week   I think about food all day. Never       Activity/Exercise History 2/11/2022   How much of a typical 12 hour day do you spend sitting? Most of the Day   How much of a typical 12 hour day do you spend lying down? Less Than Half the Day   How much of a typical day do you spend walking/standing? Half the Day   How many hours (not including work) do you spend on the TV/Video Games/Computer/Tablet/Phone? 6 Hours or More   How many times a week are you active for the purpose of exercise? 4-5 TImes a Week   What keeps you from being more active? Lack of Time   How many total minutes do you spend doing some activity for the purpose of exercising when you exercise? More Than 30 Minutes       PAST MEDICAL HISTORY:  Past Medical History:   Diagnosis Date     ADHD      Asthma      Dyslipidemia      Obesity        Work/Social History Reviewed With Patient 2/11/2022   My employment status is: Full-Time   My job is:    How much of your job is spent on the computer or phone? 100%   How many hours do you spend commuting to work daily?  0   What is your marital status? Single   If in a relationship, is your significant other overweight? No   Do you have children? No   If you have children, are they overweight? No   Who do you live with?  Alone   Are they supportive of your health goals? Yes   Who does the food shopping?  Me       Mental Health History Reviewed With Patient 2/11/2022   Have you ever been physically or sexually abused? No   If yes, do you feel that the abuse is affecting your weight? N/A   If yes, would you like  "to talk to a counselor about the abuse? N/A   How often in the past 2 weeks have you felt little interest or pleasure in doing things? Not at all   Over the past 2 weeks how often have you felt down, depressed, or hopeless? Not at all       Sleep History Reviewed With Patient 2/11/2022   How many hours do you sleep at night? 7   Do you think that you snore loudly or has anybody ever heard you snore loudly (louder than talking or so loud it can be heard behind a shut door)? Yes   Has anyone seen or heard you stop breathing during your sleep? No   Do you often feel tired, fatigued, or sleepy during the day? No   Do you have a TV/Computer in your bedroom? No       MEDICATIONS:   Current Outpatient Medications   Medication Sig Dispense Refill     albuterol (PROAIR HFA/PROVENTIL HFA/VENTOLIN HFA) 108 (90 Base) MCG/ACT inhaler Inhale 1-2 puffs into the lungs       FLOVENT  MCG/ACT inhaler INHALE 2 PUFFS TWICE A DAY FOR TWO WEEKS AT ONSET OF URI. RINSE AFTER USE.       guanFACINE (TENEX) 1 MG tablet TAKE 1 TABLET BY MOUTH EVERY DAY IN THE MORNING       Methylphenidate HCl ER 36 MG 24H tablet Take 72 mg by mouth       sildenafil (REVATIO) 20 MG tablet Take 1-2 tablets (20-40 mg) by mouth daily as needed (take 1 hour before intercourse as needed.) May increase up to 100mg dose ( 5 tablets) if needed.  Use lowest effective dose. (Patient not taking: Reported on 8/3/2020) 30 tablet 12       ALLERGIES:   Allergies   Allergen Reactions     Seasonal Allergies        PHYSICAL EXAM:  Ht 1.727 m (5' 8\")   Wt 104.3 kg (230 lb)   BMI 34.97 kg/m     A & O x 3  HEENT: NCAT, mucous membranes moist  Respirations unlabored  Location of obesity: Central Obesity    ASSESSMENT:  Miguel Angel is a patient with mature onset obesity without significant element of familial/genetic influence and with current health consequences. He does need aggressive weight loss plan due to Fatty Liver disease, Pre-Diabetes, Asthma.  Has tried to lose weight " by exercising. Has trouble sticking to diets    Miguel Angel Hernandez eats a high carb diet, eats a high fat diet, eats fast food once or more per week and has a disorganized meal pattern. Breakfast, snacking at lunch and between lunch and dinner and then after dinner. Sometimes healthy, sometimes processed.    His problem is complicated by poor lifestyle choices    His ability to lose weight is impacted by lack of confidence and lack of education on nutrition and dietary needs.    PLAN:    Dietician visit of education    Programmatic/Healthy Living  Ancillary testing:  N/A.  Food Plan:  Volumetrics and High protein/low carbohydrate.  Activity Plan:  Activity journal.  Supplementary:   N/A.    Medication:  Deferred    RTC:    12 weeks.  I spent 45 minutes with this patient face to face and explained the conditions and plans (more than 50% of time was counseling/coordination of weight management).    Sincerely,    Stefan Chin MD

## 2022-02-14 NOTE — PATIENT INSTRUCTIONS
"Jonh Weinberg,    Follow-up with RD in 1 month.     Thank you,    Roselyn Morgan, RD, LD  If you would like to schedule or reschedule an appointment with the RD, please call [phone]    Nutrition Goals  1) Reduce night-time snacking  2) Work on consuming 3 structured meal per day    Healthy Recipe Resources:  \"The Volumetrics Eating Plan\" by Aide Briceno, Ph.D.  https://www.[place]/  www.Ketsu  www.Eachbaby.Evtron  Dash Diet Recipes [facility]  www.extension.[facility] - the recipe box  \"Cooking that Counts\" by editors of Ui Link  https://www.[facility]/communityculinary/[facility]_Cookbook_KT_Final_11-6_NoCrops-compressed.pdf  Https://www.Independent IPmyplate.gov/myplatekitchen  https://snaped.fns.usda.gov/recipes-menus - SNAP recipes  https://www.diabetesfoodhub.org/all-recipes.html  https://www.[employer].Assmbly/      Avoid snacking if able. If snack is needed use lean protein and/or fruit/vegetable. Examples:   - 2 cup popcorn   - 1 cup mixed berries   - 15 almonds, walnuts, cashews   - carrot/celery sticks and 2 tbsp low-fat ranch   - 1 hard boiled egg   - Part-skim mozzarella cheese stick   - Low-fat, low-sugar greek yogurt with 1/2 cup berries   - Med apple or pear   - sliced bell peppers with 1/2 cup salsa   - 1/2 cup roasted chickpeas   - sliced cucumbers with vinegar    Snack ideas:  - Banana and creamy PB dip (https://www.diabetesfoodhub.org/recipes/sweet-peanut-buttery-dip.html?home-category_id=23)  - Roasted chickpeas (https://www.diabetesfoodhub.org/recipes/roasted-and-spiced-chickpeas.html?home-category_id=23)  - Lemon Raspberry bella seed pudding (https://www.diabetesfoodhub.org/recipes/lemon-raspberry- bella-seed-pudding.html?home-category_id=23)  - Black bean hummus with carrot and celery sticks (https://www.diabetesfoodhub.org/recipes/black-bean-hummus.html?home-category_id=23)  - Greek yogurt chocolate mouse " (https://www.diabetesfoodKickerPicker.com.org/recipes/greek-yogurt-chocolate-mousse.html?home-category_id=23)   - Broccoli Cheese Bites  (https://www.Intentio.org/recipes/broccoli-cheese-bites.html?home-category_id=20)  - Chicken Satay with peanut sauce  (https://www.Intentio.org/recipes/blueberry-almond-chicken-salad-lettuce-wraps.html?home-category_id=20)  - Deviled Eggs  (https://www.Intentio.org/recipes/devilled-eggs.html?home-category_id=20)    The Plate Method:  https://www.cdc.gov/diabetes/images/managing/Diabetes-Manage-Eat-Well-Plate-Graphic_600px.jpg    https://www.cdc.gov/diabetes/managing/eat-well/meal-plan-method.html#plate    Protein Sources   http://Sysorex/100122.pdf     Carbohydrates  http://fvfiles.com/703352.pdf     Mindful Eating  http://Sysorex/241117.pdf     Summary of Volumetrics Eating Plan  http://fvfiles.com/511661.pdf         Interested in working with a health ?  Health coaches work with you to improve your overall health and wellbeing.  They look at the whole person, and may involve discussion of different areas of life, including, but not limited to the four pillars of health (sleep, exercise, nutrition, and stress management). Discuss with your care team if you would like to start working a health .    Health Coaching-3 Pack:    $99 for three health coaching visits    Visits may be done in person or via phone    Coaching is a partnership between the  and the client; Coaches do not prescribe or diagnose    Coaching helps inspire the client to reach his/her personal goals      COMPREHENSIVE WEIGHT MANAGEMENT PROGRAM  VIRTUAL SUPPORT GROUPS    For Support Group Information:      We offer support groups for patients who are working on weight loss and considering, preparing for or have had weight loss surgery.   There is no cost for this opportunity.  You are invited to attend the?Virtual Support Groups?provided by any of the following  "locations:    1. [facility] via WeVideo Teams with Diana Rangel RN  2.   [place] via WeVideo Teams with Anjel Eng, PhD, LP  3.   [place] via WeVideo Teams with Marianna Russell RN  4.   [facility] via WeVideo Teams with Marianna Dobbs, [facility]    The following Support Group information can also be found on our website:  https://www.[facility].org/treatments/weight-loss-surgery-support-groups      [facility] Weight Loss Surgery Support Group    [facility] Weight Loss Surgery Support Group  The support group is a patient-lead forum that meets monthly to share experiences, encouragement and education. It is open to those who have had weight loss surgery, are scheduled for surgery, and those who are considering surgery.   WHEN: This group meets on the 3rd Wednesday of each month from 5:00PM - 6:00PM virtually using Microsoft Teams.   FACILITATOR: Led by Diana Rangle RD, JODIE, RN, the program's Clinical Coordinator.   TO REGISTER: Please contact the clinic via [facility] or call the nurse line directly at [phone] to inform our staff that you would like an invite sent to you and to let us know the email you would like the invite sent to. Prior to the meeting, a link with directions on how to join the meeting will be sent to you.    2022 Meetings  January 19: \"Let's Talk\" a time for the group to share.  February 16: \"Let's Talk\" a time for the group to share.  March 16: Guest Speakers: Psychologists, Geraldine Montoya, PhD,LP and Erma Wiley PsyD,  April 20: Guest Speaker: Health , Madie Maciel, [facility],CHES, CPT  May 18: Guest Speaker: DietitianMejia, ZEINA, LP  Sunita 15: \"Let's Talk\" a time for the group to share.  July 20: \"Let's Talk\" a time for the group to share.  August 17: TBA  September 21: TBA  October 19: Guest Speaker: Dr Eleazar Yarbrough MD Pulmonologist and Sleep Medicine Physician, \"Getting a Good Night's Sleep\".  November 16: TBA  December 21: TBA    M " "[facility] Support Groups    Connections: Bariatric Care Support Group?  This is open to all [facility] (and those external to this program) pre- and post- operative bariatric surgery patients as well as their support system.   WHEN: This group meets the 2nd Tuesday of each month from 6:30 PM - 8:00 PM virtually using Microsoft Teams.   FACILITATOR: Led by Ajnel Eng, Ph.D who is a Licensed Psychologist with the [facility] Comprehensive Weight Management Program.   TO REGISTER: Please send an email to Anjel Eng, Ph.D., LP at?[email or web address]?if you would like an invitation to the group and to learn about using Microsoft Teams.    2022 Meetings  January 11: Jesi Kelley, PharmD, Pharmacy Resident at [facility], \"Medications and Bariatric Surgery\".  February 8: Open Forum  March 8  April 12  May 10  Sunita 14    Connections: Post-Operative Bariatric Surgery Support Group  This is a support group for [facility] bariatric patients (and those external to [facility]) who have had bariatric surgery and are at least 3 months post-surgery.  WHEN: This support group meets the 4th Wednesday of the month from 11:00 AM - 12:00 PM virtually using Microsoft Teams.   FACILITATOR: Led by Certified Bariatric Nurse, Marianna Russell RN.   TO REGISTER: Please send an email to Marianna at [email or web address] if you would like an invitation to the group and to learn about using Microsoft Teams.    2022 Meetings  January 26  February 23  March 23  April 27  May 25  Sunita 22    [facility] Healthy Lifestyle Virtual Support Group    Healthy Lifestyle Virtual Support Group?  This is 60 minutes of small group guided discussion, support and resources. All are welcome who want a healthy lifestyle.  WHEN: This group meets monthly on a Friday from 12:30 PM - 1:30 PM virtually using Microsoft Teams.   FACILITATOR: " "Led by National Board Certified Health and , Marianna Dobbs, [facility].   TO REGISTER: Please send an email to Marianna at?[email or web address] to receive monthly invites to the group or if you have any questions about having a health .  Prior to the meeting, a link with directions on how to join the meeting will be sent to you.    2022 Meetings  January 21: Aide Alston MS, RN, CIC, CBN, \"Healthy Habits\"  February 25: Open Forum  March 18: \"Setting Limits and Boundaries\"  April 29: Christa Diaz RD, \"Meal Planning Made Easy\"  May 20: Open Forum  June: To be determined          "

## 2022-02-16 ENCOUNTER — TELEPHONE (OUTPATIENT)
Dept: ENDOCRINOLOGY | Facility: CLINIC | Age: 27
End: 2022-02-16
Payer: COMMERCIAL

## 2022-02-17 ENCOUNTER — TRANSFERRED RECORDS (OUTPATIENT)
Dept: HEALTH INFORMATION MANAGEMENT | Facility: CLINIC | Age: 27
End: 2022-02-17
Payer: COMMERCIAL

## 2022-03-17 ENCOUNTER — TRANSFERRED RECORDS (OUTPATIENT)
Dept: HEALTH INFORMATION MANAGEMENT | Facility: CLINIC | Age: 27
End: 2022-03-17
Payer: COMMERCIAL

## 2022-05-10 ENCOUNTER — TRANSFERRED RECORDS (OUTPATIENT)
Dept: HEALTH INFORMATION MANAGEMENT | Facility: CLINIC | Age: 27
End: 2022-05-10
Payer: COMMERCIAL

## 2022-06-09 ENCOUNTER — TRANSFERRED RECORDS (OUTPATIENT)
Dept: HEALTH INFORMATION MANAGEMENT | Facility: CLINIC | Age: 27
End: 2022-06-09
Payer: COMMERCIAL

## 2022-06-17 ENCOUNTER — MYC MEDICAL ADVICE (OUTPATIENT)
Dept: UROLOGY | Facility: CLINIC | Age: 27
End: 2022-06-17
Payer: COMMERCIAL

## 2022-06-17 DIAGNOSIS — N52.9 ERECTILE DYSFUNCTION, UNSPECIFIED ERECTILE DYSFUNCTION TYPE: ICD-10-CM

## 2022-06-17 RX ORDER — SILDENAFIL CITRATE 20 MG/1
20-40 TABLET ORAL DAILY PRN
Qty: 30 TABLET | Refills: 0 | Status: CANCELLED | OUTPATIENT
Start: 2022-06-17

## 2022-06-17 NOTE — TELEPHONE ENCOUNTER
sildenafil (REVATIO) 20 MG tablet      Last Written Prescription Date:  7/16/20  Last Fill Quantity: 30,   # refills: 12  Last Office Visit : 10/29/20 recommended follow up prn  Future Office visit:  None scheduled    Routing refill request to provider for review/approval because:  > 18 month since last visit  Refill or defer to primary?

## 2022-06-20 DIAGNOSIS — N52.9 ERECTILE DYSFUNCTION, UNSPECIFIED ERECTILE DYSFUNCTION TYPE: ICD-10-CM

## 2022-06-20 RX ORDER — SILDENAFIL CITRATE 20 MG/1
20-40 TABLET ORAL DAILY PRN
Qty: 30 TABLET | Refills: 1 | Status: SHIPPED | OUTPATIENT
Start: 2022-06-20 | End: 2023-02-23

## 2022-06-20 NOTE — TELEPHONE ENCOUNTER
sildenafil (REVATIO) 20 MG tablet      Last Written Prescription Date:  7/16/20 by Celestino De Oliveira  Last Fill Quantity: 30,   # refills: 12  Last Office Visit : 1/10/22  Future Office visit:  None scheduled    Routing refill request to provider for review/approval because:  Not prescribed by requested provider    Last seen by urology 7/16/20 with follow up prn  Notified through mychart regarding need for appointment with urology or refill per primary

## 2022-08-16 ENCOUNTER — TELEPHONE (OUTPATIENT)
Dept: INTERNAL MEDICINE | Facility: CLINIC | Age: 27
End: 2022-08-16

## 2022-08-16 NOTE — TELEPHONE ENCOUNTER
[facility] Call Center    Phone Message    May a detailed message be left on voicemail: yes     Reason for Call: Other: Patient wanting to discuss about referral to specialist that Dr. Gage gave him at 01/10/2022 visit. Patient saw the specialist and did not have a good experience. Wondering about Dr. Gage opinion on the medical concern. Wondering if it is possible for Dr. Gage to manage it or help find someone one else.      Patient scheduled his annual physical on 10/04/2022. Patient wanting to discuss referral and is okay with a sooner appt and keeping the annual physical for 10/04/2022. Please call back for sooner times. Appt added to the wait list.      Action Taken: Message routed to:  [facility] ([facility]    Travel Screening: Not Applicable

## 2022-08-16 NOTE — TELEPHONE ENCOUNTER
[facility] Call Center    Phone Message    May a detailed message be left on voicemail: yes     Reason for Call: Order(s): Other: annual physical labs  Reason for requested: wants to get them done after visit  Date needed: 10/04/2022  Provider name: Dr. Gage    Patient requesting annual physical lab tests are ordered. Patient wants to get labs done right after his physical. Patient scheduled for 10/04/2022.      Action Taken: Message routed to:  [facility] ([facility]    Travel Screening: Not Applicable

## 2022-08-19 NOTE — TELEPHONE ENCOUNTER
LVm w/ pcc number for pt ot schedule televist w/ Dr. Gage on 8/25 at either 7:30a or 2:30p to discuss recent call to pcc about specialist referrals

## 2022-08-25 ENCOUNTER — VIRTUAL VISIT (OUTPATIENT)
Dept: INTERNAL MEDICINE | Facility: CLINIC | Age: 27
End: 2022-08-25
Payer: COMMERCIAL

## 2022-08-25 DIAGNOSIS — N50.819 PAIN IN TESTICLE, UNSPECIFIED LATERALITY: Primary | ICD-10-CM

## 2022-08-25 PROCEDURE — 99441 PR PHYSICIAN TELEPHONE EVALUATION 5-10 MIN: CPT | Mod: 95 | Performed by: INTERNAL MEDICINE

## 2022-08-25 NOTE — Clinical Note
(1) ordered testicular U/S and please schedule (2) please have Mr. Hernandez see me on the same day as ultrasound  ThanksGENE

## 2022-08-25 NOTE — PROGRESS NOTES
Lenard is a 26 year old who is being evaluated via a billable telephone visit.      What phone number would you like to be contacted at? [phone]  How would you like to obtain your AVS? Melania  Phone call duration: 5 minutesTelephone visit    Mr. Hernandez agrees to a telephone visit    Last in-person visit with me 1/10/2022    [facility] 6/9/2022 Scanned in note for R shoulder pain    Seen Dr. Chin, Endocrinology 2/14/2022 for weight management. Nutrition Note from 2/14/2022.     He had GI visit with Dr. Cotto 1/18/2022 for NAFLD     He complains of testicular pain and swelling for more than one year. Will order testicular U/S and I will see him on the same day    GENE Gage MD

## 2022-08-26 ENCOUNTER — TELEPHONE (OUTPATIENT)
Dept: INTERNAL MEDICINE | Facility: CLINIC | Age: 27
End: 2022-08-26

## 2022-09-02 ENCOUNTER — TELEPHONE (OUTPATIENT)
Dept: INTERNAL MEDICINE | Facility: CLINIC | Age: 27
End: 2022-09-02

## 2022-09-02 NOTE — TELEPHONE ENCOUNTER
[facility] Call Center    Phone Message    May a detailed message be left on voicemail: yes     Reason for Call: Other: Pt requesting call back to discuss an appt with Dr. Gage. Pt stated he missed a call from Dr. Gage's nurse last week, pt stated he is needing a two week f/u, but at this point is needed for next week. Pt requesting if he does not answer the first time, to try him back again later since it is so hard to call the clinic back and speak with a nurse

## 2022-09-08 ENCOUNTER — ANCILLARY PROCEDURE (OUTPATIENT)
Dept: ULTRASOUND IMAGING | Facility: CLINIC | Age: 27
End: 2022-09-08
Attending: INTERNAL MEDICINE
Payer: COMMERCIAL

## 2022-09-08 DIAGNOSIS — N50.819 PAIN IN TESTICLE, UNSPECIFIED LATERALITY: ICD-10-CM

## 2022-09-08 PROCEDURE — 76870 US EXAM SCROTUM: CPT | Performed by: STUDENT IN AN ORGANIZED HEALTH CARE EDUCATION/TRAINING PROGRAM

## 2022-09-08 PROCEDURE — 93976 VASCULAR STUDY: CPT | Performed by: STUDENT IN AN ORGANIZED HEALTH CARE EDUCATION/TRAINING PROGRAM

## 2022-09-12 NOTE — PROGRESS NOTES
Lenard is a 27 year old who is being evaluated via a billable telephone visit.      What phone number would you like to be contacted at? [phone]  How would you like to obtain your AVS? Melania  Phone call duration: 5 minutes      Ashley Vargas VF          Telephone visit     Mr. Hernandez agrees to a telephone visit    Last telephone visit with me 8/25/2022    He had an unremarkable testicular ultrasound 9/8/2022    He was seen by Dr. De Oliveira Urology 10/29/2020 for a similar complaint.     He states L sided groin lower abdominal sxs. And is concerned he may have a hernia.     Will get an abdominal/pelvic CT scan and see him on 10/4/2022    GENE Gage MD

## 2022-09-13 ENCOUNTER — VIRTUAL VISIT (OUTPATIENT)
Dept: INTERNAL MEDICINE | Facility: CLINIC | Age: 27
End: 2022-09-13
Payer: COMMERCIAL

## 2022-09-13 DIAGNOSIS — R10.13 ABDOMINAL PAIN, EPIGASTRIC: Primary | ICD-10-CM

## 2022-09-13 PROCEDURE — 99213 OFFICE O/P EST LOW 20 MIN: CPT | Mod: 95 | Performed by: INTERNAL MEDICINE

## 2022-09-13 NOTE — PATIENT INSTRUCTIONS
Thank you for visiting the Primary Care Center today at the [facility]! The following is some information about our clinic:     Primary Care Center Frequently-Asked Questions    (1) How do I schedule appointments at the [facility]?     Primary Care--to schedule or make changes to an existing appointment, please call our primary care line at [phone].    Labs--to schedule a lab appointment at the [facility] you can use [employer] or call [phone]. If you have a [place] location that is closer to home, you can reach out to that location for scheduling options.     Imaging--if you need to schedule a CT, X-ray, MRI, ultrasound, or other imaging study you can call [phone] to schedule at the [facility] or any other [facility] imaging location.     Referrals--if a referral to another specialty was ordered you can expect a phone call from their scheduling team. If you have not heard from them in a week, please call us or send us a [employer] message to check the status or get a scheduling number. Please note that this only applies to internal [facility] referrals. If the referral is external you would need to contact their office for scheduling.     (2) I have a question about my visit, who do I contact?     You can call us at the primary care line at [phone] to ask questions about your visit. You can also send a secure message through [employer], which is reviewed by clinic staff. Please note that [employer] messages have a twenty-four to forty-eight business hour turnaround time and should not be used for urgent concerns.    (3) How will I get the results of my tests?    If you are signed up for [employer] all tests will be released to you within twenty-four hours of resulting. Please allow three to five days for your doctor to review your results and place a note interpreting the results. If you do not have [employer] you will receive your  results through mail seven to ten business days following the return of the tests. Please note that if there should be any urgent or concerning results that your doctor or their registered nurse will reach out to you the same day as the tests come back. If you have follow up questions about your results or would like to discuss the results in detail please schedule a follow up with your provider either in person or virtually.     (4) How do I get refills of my prescriptions?     You should always first contact your pharmacy for refills of your medications. If submitting a refill request on [employer], please be sure to submit the request only once--repeat requests can cause delays in refill. If you are requesting a NEW medication or a medication related to new symptoms you will need to schedule an appointment with a provider prior to approval. Please note: Routine medication refills have up to one to three business day turnaround whereas controlled substances refills have up to five to seven business day turnaround.    (5) I have new symptoms, what do I do?     If you are having an immediate medical emergency, you should dial 911 for assistance.   For anything urgent that needs to be seen within a few hours to one day you should visit a local urgent care for assistance.  For non-urgent symptoms that need to be seen within a few days to a week you can schedule with an available provider in primary care by going to [employer] or calling [phone].   If you are not sure how serious your symptoms are or you would like to receive medical advice you can always call [phone] to speak with a triage nurse.

## 2022-10-03 NOTE — PROGRESS NOTES
HPI:    Last telephone visit with me 9/13/2022. He comes in for a physical today. He still has pelvic lower abdominal pain sometimes after sex. He relates this pain behind his scrotal area/perineium. This complaint is no related to bowel movements. No urinary complaints. He considers low risk for STD's but would like to be tested anyway. He has R shoulder pain and would like to get PT. He states he is exercising more. He does not smoke nor abuse EtOH. No other HEENT, cardiopulmonary, abdominal, , neurology, systemic, psychiatric, lymphatic, endocrine, vascular complaints.     Past Medical History:   Diagnosis Date     ADHD      Asthma      Dyslipidemia      Obesity      Past Surgical History:   Procedure Laterality Date     TONSILLECTOMY Bilateral     childhood     PE:    Vitals noted, gen, nad, cooperative, alert, neck supple, nl rom, lungs with good air movement, RRR, S1, S2, no MRG, abdomen, no acute findings. R shoulder with tenderness to palpation superior aspect with good ROM, abdomen, no acute findings and positive bowel sounds. Possibly some mild L sided suprapubic tenderness to palpation.     A/P:    1. Immunizations; Pfizer COVID vacine x 3. Tdap 7/13/2015. Influenza vaccine today.   2. Abdominal/groin pain; He had an unremarkable testicular U/S 9/8/2022. He saw Dr. De Oliveira, Urology 10/29/2020 for a similar complaint. CT abdominal/pelvic imaging today 10/4/2022; preliminary no acute findings. Ordered dedicated pelvic MRI imagining and referral back to Dr. De Oliveira, Urology. UA ordered today.   3. Lipids; ordered today 10/4/2022  4. Seen by Dr. Chin, Wt. Management 2/14/2022.   5. Seen Dr. Cotto, GI 1/18/2022 for hepatic steatosis.   6. Sleep Medicine appt. With Dr. Zuniga 1/5/2022  7. ADHD followed by outside Psychiatry.   8. STD testing ordered laboratory testing today  9. PT for R shoulder. If worse sxs. Would do imaging and/or orthopedic evaluation.

## 2022-10-04 ENCOUNTER — ANCILLARY PROCEDURE (OUTPATIENT)
Dept: CT IMAGING | Facility: CLINIC | Age: 27
End: 2022-10-04
Attending: INTERNAL MEDICINE
Payer: COMMERCIAL

## 2022-10-04 ENCOUNTER — LAB (OUTPATIENT)
Dept: LAB | Facility: CLINIC | Age: 27
End: 2022-10-04
Payer: COMMERCIAL

## 2022-10-04 ENCOUNTER — OFFICE VISIT (OUTPATIENT)
Dept: INTERNAL MEDICINE | Facility: CLINIC | Age: 27
End: 2022-10-04
Payer: COMMERCIAL

## 2022-10-04 VITALS
HEIGHT: 68 IN | HEART RATE: 79 BPM | WEIGHT: 239.3 LBS | SYSTOLIC BLOOD PRESSURE: 138 MMHG | BODY MASS INDEX: 36.27 KG/M2 | OXYGEN SATURATION: 97 % | DIASTOLIC BLOOD PRESSURE: 82 MMHG

## 2022-10-04 DIAGNOSIS — R05.9 COUGH, UNSPECIFIED TYPE: ICD-10-CM

## 2022-10-04 DIAGNOSIS — M25.511 CHRONIC RIGHT SHOULDER PAIN: ICD-10-CM

## 2022-10-04 DIAGNOSIS — Z11.3 SCREEN FOR STD (SEXUALLY TRANSMITTED DISEASE): ICD-10-CM

## 2022-10-04 DIAGNOSIS — R10.2 PELVIC PAIN IN MALE: ICD-10-CM

## 2022-10-04 DIAGNOSIS — R74.8 ELEVATED LIVER ENZYMES: ICD-10-CM

## 2022-10-04 DIAGNOSIS — R10.2 PELVIC PAIN IN MALE: Primary | ICD-10-CM

## 2022-10-04 DIAGNOSIS — G89.29 CHRONIC RIGHT SHOULDER PAIN: ICD-10-CM

## 2022-10-04 DIAGNOSIS — R10.13 ABDOMINAL PAIN, EPIGASTRIC: ICD-10-CM

## 2022-10-04 LAB
ALBUMIN SERPL BCG-MCNC: 4.5 G/DL (ref 3.5–5.2)
ALBUMIN SERPL BCG-MCNC: 4.6 G/DL (ref 3.5–5.2)
ALBUMIN UR-MCNC: NEGATIVE MG/DL
ALP SERPL-CCNC: 79 U/L (ref 40–129)
ALP SERPL-CCNC: 80 U/L (ref 40–129)
ALT SERPL W P-5'-P-CCNC: 61 U/L (ref 10–50)
ALT SERPL W P-5'-P-CCNC: 64 U/L (ref 10–50)
ANION GAP SERPL CALCULATED.3IONS-SCNC: 10 MMOL/L (ref 7–15)
APPEARANCE UR: CLEAR
AST SERPL W P-5'-P-CCNC: 43 U/L (ref 10–50)
AST SERPL W P-5'-P-CCNC: 43 U/L (ref 10–50)
BASOPHILS # BLD AUTO: 0.1 10E3/UL (ref 0–0.2)
BASOPHILS NFR BLD AUTO: 1 %
BILIRUB DIRECT SERPL-MCNC: <0.2 MG/DL (ref 0–0.3)
BILIRUB SERPL-MCNC: 0.3 MG/DL
BILIRUB SERPL-MCNC: 0.3 MG/DL
BILIRUB UR QL STRIP: NEGATIVE
BUN SERPL-MCNC: 20.8 MG/DL (ref 6–20)
CALCIUM SERPL-MCNC: 9.3 MG/DL (ref 8.6–10)
CHLORIDE SERPL-SCNC: 104 MMOL/L (ref 98–107)
CHOLEST SERPL-MCNC: 194 MG/DL
COLOR UR AUTO: YELLOW
CREAT SERPL-MCNC: 1.01 MG/DL (ref 0.67–1.17)
DEPRECATED HCO3 PLAS-SCNC: 26 MMOL/L (ref 22–29)
EOSINOPHIL # BLD AUTO: 0.7 10E3/UL (ref 0–0.7)
EOSINOPHIL NFR BLD AUTO: 8 %
ERYTHROCYTE [DISTWIDTH] IN BLOOD BY AUTOMATED COUNT: 12.6 % (ref 10–15)
FERRITIN SERPL-MCNC: 238 NG/ML (ref 31–409)
GFR SERPL CREATININE-BSD FRML MDRD: >90 ML/MIN/1.73M2
GLUCOSE SERPL-MCNC: 98 MG/DL (ref 70–99)
GLUCOSE UR STRIP-MCNC: NEGATIVE MG/DL
HCT VFR BLD AUTO: 48.1 % (ref 40–53)
HDLC SERPL-MCNC: 41 MG/DL
HGB BLD-MCNC: 16.4 G/DL (ref 13.3–17.7)
HGB UR QL STRIP: NEGATIVE
HIV 1+2 AB+HIV1 P24 AG SERPL QL IA: NONREACTIVE
HOLD SPECIMEN: NORMAL
IMM GRANULOCYTES # BLD: 0 10E3/UL
IMM GRANULOCYTES NFR BLD: 1 %
IRON BINDING CAPACITY (ROCHE): 346 UG/DL (ref 240–430)
IRON SATN MFR SERPL: 22 % (ref 15–46)
IRON SERPL-MCNC: 76 UG/DL (ref 61–157)
KETONES UR STRIP-MCNC: NEGATIVE MG/DL
LDLC SERPL CALC-MCNC: 124 MG/DL
LEUKOCYTE ESTERASE UR QL STRIP: NEGATIVE
LYMPHOCYTES # BLD AUTO: 2.9 10E3/UL (ref 0.8–5.3)
LYMPHOCYTES NFR BLD AUTO: 35 %
MCH RBC QN AUTO: 29.8 PG (ref 26.5–33)
MCHC RBC AUTO-ENTMCNC: 34.1 G/DL (ref 31.5–36.5)
MCV RBC AUTO: 87 FL (ref 78–100)
MONOCYTES # BLD AUTO: 0.6 10E3/UL (ref 0–1.3)
MONOCYTES NFR BLD AUTO: 8 %
NEUTROPHILS # BLD AUTO: 4 10E3/UL (ref 1.6–8.3)
NEUTROPHILS NFR BLD AUTO: 47 %
NITRATE UR QL: NEGATIVE
NONHDLC SERPL-MCNC: 153 MG/DL
NRBC # BLD AUTO: 0 10E3/UL
NRBC BLD AUTO-RTO: 0 /100
PH UR STRIP: 6 [PH] (ref 5–7)
PLATELET # BLD AUTO: 309 10E3/UL (ref 150–450)
POTASSIUM SERPL-SCNC: 4.7 MMOL/L (ref 3.4–5.3)
PROT SERPL-MCNC: 6.6 G/DL (ref 6.4–8.3)
PROT SERPL-MCNC: 6.8 G/DL (ref 6.4–8.3)
RBC # BLD AUTO: 5.51 10E6/UL (ref 4.4–5.9)
RBC URINE: <1 /HPF
SODIUM SERPL-SCNC: 140 MMOL/L (ref 136–145)
SP GR UR STRIP: 1.03 (ref 1–1.03)
TRIGL SERPL-MCNC: 145 MG/DL
UROBILINOGEN UR STRIP-MCNC: NORMAL MG/DL
WBC # BLD AUTO: 8.2 10E3/UL (ref 4–11)
WBC URINE: 1 /HPF

## 2022-10-04 PROCEDURE — 36415 COLL VENOUS BLD VENIPUNCTURE: CPT | Performed by: PATHOLOGY

## 2022-10-04 PROCEDURE — 84155 ASSAY OF PROTEIN SERUM: CPT | Performed by: PATHOLOGY

## 2022-10-04 PROCEDURE — 82247 BILIRUBIN TOTAL: CPT | Performed by: PATHOLOGY

## 2022-10-04 PROCEDURE — 83540 ASSAY OF IRON: CPT | Performed by: PATHOLOGY

## 2022-10-04 PROCEDURE — 87389 HIV-1 AG W/HIV-1&-2 AB AG IA: CPT | Performed by: PATHOLOGY

## 2022-10-04 PROCEDURE — 99395 PREV VISIT EST AGE 18-39: CPT | Mod: 25 | Performed by: INTERNAL MEDICINE

## 2022-10-04 PROCEDURE — 87491 CHLMYD TRACH DNA AMP PROBE: CPT | Performed by: PATHOLOGY

## 2022-10-04 PROCEDURE — 85025 COMPLETE CBC W/AUTO DIFF WBC: CPT | Performed by: PATHOLOGY

## 2022-10-04 PROCEDURE — 84075 ASSAY ALKALINE PHOSPHATASE: CPT | Performed by: PATHOLOGY

## 2022-10-04 PROCEDURE — 86364 TISS TRNSGLTMNASE EA IG CLAS: CPT | Performed by: PATHOLOGY

## 2022-10-04 PROCEDURE — 81001 URINALYSIS AUTO W/SCOPE: CPT | Performed by: PATHOLOGY

## 2022-10-04 PROCEDURE — 90471 IMMUNIZATION ADMIN: CPT | Performed by: INTERNAL MEDICINE

## 2022-10-04 PROCEDURE — 99000 SPECIMEN HANDLING OFFICE-LAB: CPT | Performed by: PATHOLOGY

## 2022-10-04 PROCEDURE — 82248 BILIRUBIN DIRECT: CPT | Performed by: PATHOLOGY

## 2022-10-04 PROCEDURE — 84460 ALANINE AMINO (ALT) (SGPT): CPT | Performed by: PATHOLOGY

## 2022-10-04 PROCEDURE — 87591 N.GONORRHOEAE DNA AMP PROB: CPT | Performed by: PATHOLOGY

## 2022-10-04 PROCEDURE — 83550 IRON BINDING TEST: CPT | Performed by: PATHOLOGY

## 2022-10-04 PROCEDURE — 82728 ASSAY OF FERRITIN: CPT | Performed by: PATHOLOGY

## 2022-10-04 PROCEDURE — 80061 LIPID PANEL: CPT | Performed by: PATHOLOGY

## 2022-10-04 PROCEDURE — 74176 CT ABD & PELVIS W/O CONTRAST: CPT | Mod: GC | Performed by: RADIOLOGY

## 2022-10-04 PROCEDURE — 84450 TRANSFERASE (AST) (SGOT): CPT | Performed by: PATHOLOGY

## 2022-10-04 PROCEDURE — 90686 IIV4 VACC NO PRSV 0.5 ML IM: CPT | Performed by: INTERNAL MEDICINE

## 2022-10-04 RX ORDER — DEXAMETHASONE 4 MG/1
TABLET ORAL
Qty: 12 G | Refills: 3 | Status: SHIPPED | OUTPATIENT
Start: 2022-10-04 | End: 2023-11-20

## 2022-10-04 RX ORDER — ALBUTEROL SULFATE 90 UG/1
1-2 AEROSOL, METERED RESPIRATORY (INHALATION) EVERY 6 HOURS PRN
Qty: 18 G | Refills: 3 | Status: SHIPPED | OUTPATIENT
Start: 2022-10-04 | End: 2024-05-23

## 2022-10-04 NOTE — NURSING NOTE
Miguel Angel Hernandez received the flu vaccine in clinic today at the request of Dr. Gage. The immunization site was cleaned with an alcohol prep wipe. The immunization was given without incident--see immunization list for administration details. No swelling or redness was observed at the site of injection after the immunization was given. Pt has no history of reaction to vaccines.     Shira Sutton, EMT at 10:13 AM on 10/4/2022

## 2022-10-04 NOTE — NURSING NOTE
Miguel Angel Hernandez is a 27 year old male patient that presents today in clinic for the following:    Chief Complaint   Patient presents with     Physical     Referral     Would like to discuss ortho and derm referral     Shoulder Pain     Results     Would like to discuss CT     Medication Refill     The patient's allergies and medications were reviewed as noted. A set of vitals were recorded as noted without incident. The patient does not have any other questions for the provider.    Shira Sutton, EMT at 9:31 AM on 10/4/2022

## 2022-10-05 LAB
C TRACH DNA SPEC QL NAA+PROBE: NEGATIVE
N GONORRHOEA DNA SPEC QL NAA+PROBE: NEGATIVE
TTG IGA SER-ACNC: <0.2 U/ML
TTG IGG SER-ACNC: <0.6 U/ML

## 2022-10-06 NOTE — TELEPHONE ENCOUNTER
LVM w/ pcc number for pt to schedule labs & help faclilitate uro/pt referrals placed by pcp, also provided pt w/ imaging number to schedule MRI

## 2022-10-18 ENCOUNTER — THERAPY VISIT (OUTPATIENT)
Dept: PHYSICAL THERAPY | Facility: CLINIC | Age: 27
End: 2022-10-18
Attending: INTERNAL MEDICINE
Payer: COMMERCIAL

## 2022-10-18 DIAGNOSIS — G89.29 CHRONIC RIGHT SHOULDER PAIN: ICD-10-CM

## 2022-10-18 DIAGNOSIS — R10.2 PELVIC PAIN IN MALE: ICD-10-CM

## 2022-10-18 DIAGNOSIS — Z11.3 SCREEN FOR STD (SEXUALLY TRANSMITTED DISEASE): ICD-10-CM

## 2022-10-18 DIAGNOSIS — M25.511 CHRONIC RIGHT SHOULDER PAIN: ICD-10-CM

## 2022-10-18 PROCEDURE — 97110 THERAPEUTIC EXERCISES: CPT | Mod: GP

## 2022-10-18 PROCEDURE — 97161 PT EVAL LOW COMPLEX 20 MIN: CPT | Mod: GP

## 2022-10-18 NOTE — PROGRESS NOTES
Physical Therapy Initial Evaluation  Subjective:  The history is provided by the patient. No  was used.   Therapist Generated HPI Evaluation  Problem details: Lenard reports hx of AC separation on L shoulder and thinks he did the same thing to his R shoulder. Remembers about 6 mo ago sleeping on his stomach w/ arms out and his partner was laying on his R shoulder then woke up w/ pain. He took it easy for a few months, then the past few mo has been doing more at the gym and notices a gradual worsening of sx.    Exercise: has a gym routine doing plyos/circuits. Is doing light weights d/t his pain.   Goals:  He enjoys snowboarding and wants to make sure his shoulder is prepared for this coming season.   .         Type of problem:  Right shoulder.    This is a new condition.      Site of Pain: AC jt.  Pain is described as aching and is intermittent.    Since onset symptoms are gradually worsening.  Associated symptoms:  Loss of motion/stiffness. Symptoms are exacerbated by lifting, carrying, using arm overhead and using arm behind back (horizontal adduction)  and relieved by ice.                              Objective:  Standing Alignment:    Cervical/Thoracic:  Forward head  Shoulder/UE:  Depressed scapula L (AC jt normal appearing bilaterally, good scap mechanics w/ arm overhead and no winging)                                       Shoulder Evaluation:  ROM:  AROM:    Flexion:  Left:  WNL    Right:  WNL    Abduction:  Left: WNL   Right:  WNL    Internal Rotation:  Left:  73    Right:  70  External Rotation:  Left:  68    Right:  70                  Pain: Pain w/ R shoulder abduction, no pain when inferior glide provided to humeral head during    Strength:    Flexion: Left:5/5   Pain:    Right:  4+/5  Strong/painful     Pain:  +    Abduction:  Left: 5/5  Pain:    Right: 5/5      Pain:-    Internal Rotation:  Left:4+/5     Pain:    Right: 4+/5      Pain:-  External Rotation:   Left:4+/5     Pain:    Right:4/5    Strong/painful    Pain:+        Elbow Flexion:  Left:5/5     Pain:    Right:5/5     Pain:    Stability Testing:      Left shoulder stability negative testing:  Apprehension and Relocation    Right shoulder stability negative testing:  Apprehension and Relocation    Palpation:      Left shoulder tenderness not present at: Clavicle; Acrimioclavicular; Supraspinatus; Infraspinatus; Deltoid; Levator; Rhomboids or Upper Trap    Right shoulder tenderness not present at:Clavicle; Acrimioclavicular; Supraspinatus; Infraspinatus; Deltoid; Levator; Rhomboids or Upper Trap                                   Strength: Middle and lower trap 5/5 L, 4/5 R ltd by painful resisted testing    General     ROS    Assessment/Plan:    Patient is a 27 year old male with right side shoulder complaints.    Patient has the following significant findings with corresponding treatment plan.                Diagnosis 1:  R Shoulder Pain  Pain -  hot/cold therapy, US, electric stimulation, mechanical traction, manual therapy, splint/taping/bracing/orthotics, self management, education, directional preference exercise and home program  Decreased ROM/flexibility - manual therapy, therapeutic exercise and therapeutic activity  Decreased strength - therapeutic exercise, therapeutic activities and home program  Impaired muscle performance - biofeedback, electric stimulation and neuro re-education  Decreased function - therapeutic activities and home program    Therapy Evaluation Codes:   1) History comprised of:   Personal factors that impact the plan of care:      None.    Comorbidity factors that impact the plan of care are:      None.     Medications impacting care: None.  2) Examination of Body Systems comprised of:   Body structures and functions that impact the plan of care:      Cervical spine, Shoulder and Thoracic Spine.   Activity limitations that impact the plan of care are:      Lifting, Sports, Squatting/kneeling and  Throwing.  3) Clinical presentation characteristics are:   Stable/Uncomplicated.  4) Decision-Making    Low complexity using standardized patient assessment instrument and/or measureable assessment of functional outcome.  Cumulative Therapy Evaluation is: Low complexity.    Previous and current functional limitations:  (See Goal Flow Sheet for this information)    Short term and Long term goals: (See Goal Flow Sheet for this information)     Communication ability:  Patient appears to be able to clearly communicate and understand verbal and written communication and follow directions correctly.  Treatment Explanation - The following has been discussed with the patient:   RX ordered/plan of care  Anticipated outcomes  Possible risks and side effects  This patient would benefit from PT intervention to resume normal activities.   Rehab potential is good.    Frequency:  1 X week, once daily  Duration:  for 8 weeks  Discharge Plan:  Achieve all LTG.  Independent in home treatment program.  Reach maximal therapeutic benefit.    Please refer to the daily flowsheet for treatment today, total treatment time and time spent performing 1:1 timed codes.     Apurva Olsen, PT, DPT

## 2022-10-18 NOTE — PROGRESS NOTES
Physical Therapy Initial Evaluation  Subjective:    Patient Health History           General health as reported by patient is good.  Pertinent medical history includes: asthma and overweight.   Red flags:  None as reported by patient.          Other medications details: ADHAD- methylchordidas?.    Current occupation is .   Primary job tasks include:  Prolonged sitting, prolonged standing and computer work.                                    Objective:  System    Physical Exam    General     ROS    Assessment/Plan:

## 2022-10-26 ENCOUNTER — THERAPY VISIT (OUTPATIENT)
Dept: PHYSICAL THERAPY | Facility: CLINIC | Age: 27
End: 2022-10-26
Payer: COMMERCIAL

## 2022-10-26 DIAGNOSIS — M25.511 CHRONIC RIGHT SHOULDER PAIN: Primary | ICD-10-CM

## 2022-10-26 DIAGNOSIS — G89.29 CHRONIC RIGHT SHOULDER PAIN: Primary | ICD-10-CM

## 2022-10-26 PROCEDURE — 97110 THERAPEUTIC EXERCISES: CPT | Mod: GP

## 2022-11-01 ENCOUNTER — THERAPY VISIT (OUTPATIENT)
Dept: PHYSICAL THERAPY | Facility: CLINIC | Age: 27
End: 2022-11-01
Payer: COMMERCIAL

## 2022-11-01 ENCOUNTER — MYC MEDICAL ADVICE (OUTPATIENT)
Dept: INTERNAL MEDICINE | Facility: CLINIC | Age: 27
End: 2022-11-01

## 2022-11-01 DIAGNOSIS — M25.511 CHRONIC RIGHT SHOULDER PAIN: Primary | ICD-10-CM

## 2022-11-01 DIAGNOSIS — G89.29 CHRONIC RIGHT SHOULDER PAIN: Primary | ICD-10-CM

## 2022-11-01 PROCEDURE — 97530 THERAPEUTIC ACTIVITIES: CPT | Mod: GP

## 2022-11-01 PROCEDURE — 97110 THERAPEUTIC EXERCISES: CPT | Mod: GP

## 2022-11-04 ENCOUNTER — LAB (OUTPATIENT)
Dept: LAB | Facility: CLINIC | Age: 27
End: 2022-11-04
Payer: COMMERCIAL

## 2022-11-04 ENCOUNTER — ANCILLARY PROCEDURE (OUTPATIENT)
Dept: MRI IMAGING | Facility: CLINIC | Age: 27
End: 2022-11-04
Attending: INTERNAL MEDICINE
Payer: COMMERCIAL

## 2022-11-04 DIAGNOSIS — G89.29 CHRONIC RIGHT SHOULDER PAIN: ICD-10-CM

## 2022-11-04 DIAGNOSIS — M25.511 CHRONIC RIGHT SHOULDER PAIN: ICD-10-CM

## 2022-11-04 DIAGNOSIS — R10.2 PELVIC PAIN IN MALE: ICD-10-CM

## 2022-11-04 DIAGNOSIS — Z11.3 SCREEN FOR STD (SEXUALLY TRANSMITTED DISEASE): ICD-10-CM

## 2022-11-04 LAB
ALBUMIN UR-MCNC: NEGATIVE MG/DL
APPEARANCE UR: CLEAR
BILIRUB UR QL STRIP: NEGATIVE
COLOR UR AUTO: YELLOW
GLUCOSE UR STRIP-MCNC: NEGATIVE MG/DL
HGB UR QL STRIP: NEGATIVE
HYALINE CASTS: 1 /LPF
KETONES UR STRIP-MCNC: NEGATIVE MG/DL
LEUKOCYTE ESTERASE UR QL STRIP: NEGATIVE
MUCOUS THREADS #/AREA URNS LPF: PRESENT /LPF
NITRATE UR QL: NEGATIVE
PH UR STRIP: 5 [PH] (ref 5–7)
RBC URINE: <1 /HPF
SP GR UR STRIP: 1.01 (ref 1–1.03)
UROBILINOGEN UR STRIP-MCNC: NORMAL MG/DL
WBC URINE: 1 /HPF

## 2022-11-04 PROCEDURE — 81001 URINALYSIS AUTO W/SCOPE: CPT | Performed by: PATHOLOGY

## 2022-11-04 PROCEDURE — A9585 GADOBUTROL INJECTION: HCPCS | Performed by: RADIOLOGY

## 2022-11-04 PROCEDURE — 72197 MRI PELVIS W/O & W/DYE: CPT | Performed by: RADIOLOGY

## 2022-11-04 RX ORDER — GADOBUTROL 604.72 MG/ML
10 INJECTION INTRAVENOUS ONCE
Status: COMPLETED | OUTPATIENT
Start: 2022-11-04 | End: 2022-11-04

## 2022-11-04 RX ADMIN — GADOBUTROL 10 ML: 604.72 INJECTION INTRAVENOUS at 08:50

## 2022-11-04 NOTE — DISCHARGE INSTRUCTIONS
MRI Contrast Discharge Instructions    The IV contrast you received today will pass out of your body in your  urine. This will happen in the next 24 hours. You will not feel this process.  Your urine will not change color.    Drink at least 4 extra glasses of water or juice today (unless your doctor  has restricted your fluids). This reduces the stress on your kidneys.  You may take your regular medicines.    If you are on dialysis: It is best to have dialysis today.    If you have a reaction: Most reactions happen right away. If you have  any new symptoms after leaving the hospital (such as hives or swelling),  call your hospital at the correct number below. Or call your family doctor.  If you have breathing distress or wheezing, call 911.    Special instructions: ***    I have read and understand the above information.    Signature:______________________________________ Date:___________    Staff:__________________________________________ Date:___________     Time:__________    [facility] Departments:    ___[place]: [phone]  ___[facility]: [phone]  ___[place]: [phone] ___[facility]: [phone]  ___[facility]: [phone]  ___[facility]: [phone]  ___[phone]  ___[facility]: [phone]  ___[phone]

## 2022-11-14 NOTE — PROGRESS NOTES
Lenard is a 27 year old who is being evaluated via a billable telephone visit.      What phone number would you like to be contacted at? [phone]  How would you like to obtain your AVS? Melania  Phone call duration: 8 minutes      Ashley Vargas [facility]          Telephone visit    Mr. Hernandez agrees to a telephone visit     Still with pelvic complaints and will refer onto Urology with Ms. Dahl    Still with R shoulder pain. He is getting PT. Discussed repeating (plain X-ray, he states R shoulder X-ray done at [facility] this past summer). Also discussed R shoulder MRI and orthopedic referral/evalution. He wants to hold on this for now and if worse will go ahead with further R shoulder evaluation.     Last visit with us 10/4/2022 and additional information in that note.     Past Medical History:   Diagnosis Date     ADHD      Asthma      Dyslipidemia      Obesity      Past Surgical History:   Procedure Laterality Date     TONSILLECTOMY Bilateral     childhood     PE:    Vitals noted, gen, nad, cooperative, alert    A/P:    1. Immunizations; Pfizer COVID vacine x 3. Tdap 7/13/2015. Influenza vaccine 10/4/2022   2. Abdominal/groin pain; He had an unremarkable testicular U/S 9/8/2022. He saw Dr. De Oliveira, Urology 10/29/2020 for a similar complaint. CT abdominal/pelvic imaging was done 10/4/2022. Dedicated pelvic MRI imagining  Was done 11/4/2022 and unremarkable.  Referred back to Dr. De Oliveira, Urology on 10/18/2022. UA was normal 11/4/2022  3. Lipids;  10/4/2022 , TG's 145 and HDL 41.   4. Seen by Dr. Chin, Wt. Management 2/14/2022.   5. Seen Dr. Cotto, GI 1/18/2022 for hepatic steatosis. ALT was 61 on 10/4/2022  6. Sleep Medicine appt. With Dr. Zuniga 1/5/2022  7. ADHD followed by outside Psychiatry.   8. STD testing; no abnormalities on 10/4/2022.   9. PT for R shoulder 10/18/2022 and next 11/29/2022.  If worse sxs. Would do imaging and/or orthopedic evaluation.

## 2022-11-15 ENCOUNTER — TELEPHONE (OUTPATIENT)
Dept: UROLOGY | Facility: CLINIC | Age: 27
End: 2022-11-15

## 2022-11-15 ENCOUNTER — VIRTUAL VISIT (OUTPATIENT)
Dept: INTERNAL MEDICINE | Facility: CLINIC | Age: 27
End: 2022-11-15
Payer: COMMERCIAL

## 2022-11-15 DIAGNOSIS — R10.2 PELVIC PAIN IN MALE: Primary | ICD-10-CM

## 2022-11-15 PROCEDURE — 99213 OFFICE O/P EST LOW 20 MIN: CPT | Mod: 95 | Performed by: INTERNAL MEDICINE

## 2022-11-15 NOTE — TELEPHONE ENCOUNTER
Call center called with pt on the line. Pt has a referral from Dr Gage for pelvic pain to see Kacey. Pt has been seen by Dr De Oliveira for ED and testicular pain in the past. Pt stated they do not want to be scheduled as a new pt as our protocols state due to having a new issue and seeing a new provider. Since pt mentioned their testicular pain is still present, pt will be scheduled as a return patient. Pt stated their ED is not diagnosed. Pt can have an appt with Kacey or Dr De Oliveira, next available, return patient.

## 2022-11-15 NOTE — PATIENT INSTRUCTIONS
Thank you for visiting the Primary Care Center today at the [facility]! The following is some information about our clinic:     Primary Care Center Frequently-Asked Questions    (1) How do I schedule appointments at the [facility]?     Primary Care--to schedule or make changes to an existing appointment, please call our primary care line at [phone].    Labs--to schedule a lab appointment at the [facility] you can use [employer] or call [phone]. If you have a [place] location that is closer to home, you can reach out to that location for scheduling options.     Imaging--if you need to schedule a CT, X-ray, MRI, ultrasound, or other imaging study you can call [phone] to schedule at the [facility] or any other [facility] imaging location.     Referrals--if a referral to another specialty was ordered you can expect a phone call from their scheduling team. If you have not heard from them in a week, please call us or send us a Priztag message to check the status or get a scheduling number. Please note that this only applies to internal [facility] referrals. If the referral is external you would need to contact their office for scheduling.     (2) I have a question about my visit, who do I contact?     You can call us at the primary care line at [phone] to ask questions about your visit. You can also send a secure message through Priztag, which is reviewed by clinic staff. Please note that [employer] messages have a twenty-four to forty-eight business hour turnaround time and should not be used for urgent concerns.    (3) How will I get the results of my tests?    If you are signed up for [employer] all tests will be released to you within twenty-four hours of resulting. Please allow three to five days for your doctor to review your results and place a note interpreting the results. If you do not have [facility] [employer] you will receive your  results through mail seven to ten business days following the return of the tests. Please note that if there should be any urgent or concerning results that your doctor or their registered nurse will reach out to you the same day as the tests come back. If you have follow up questions about your results or would like to discuss the results in detail please schedule a follow up with your provider either in person or virtually.     (4) How do I get refills of my prescriptions?     You should always first contact your pharmacy for refills of your medications. If submitting a refill request on [employer], please be sure to submit the request only once--repeat requests can cause delays in refill. If you are requesting a NEW medication or a medication related to new symptoms you will need to schedule an appointment with a provider prior to approval. Please note: Routine medication refills have up to one to three business day turnaround whereas controlled substances refills have up to five to seven business day turnaround.    (5) I have new symptoms, what do I do?     If you are having an immediate medical emergency, you should dial 911 for assistance.   For anything urgent that needs to be seen within a few hours to one day you should visit a local urgent care for assistance.  For non-urgent symptoms that need to be seen within a few days to a week you can schedule with an available provider in primary care by going to [employer] or calling [phone].   If you are not sure how serious your symptoms are or you would like to receive medical advice you can always call [phone] to speak with a triage nurse.

## 2022-11-21 ENCOUNTER — PRE VISIT (OUTPATIENT)
Dept: UROLOGY | Facility: CLINIC | Age: 27
End: 2022-11-21

## 2022-11-21 NOTE — TELEPHONE ENCOUNTER
Reason for visit: consult      Dx/Hx/Sx: pelvic pain, ED    Records/imaging/labs/orders: in epic     At Rooming: video visit

## 2022-11-29 ENCOUNTER — THERAPY VISIT (OUTPATIENT)
Dept: PHYSICAL THERAPY | Facility: CLINIC | Age: 27
End: 2022-11-29
Payer: COMMERCIAL

## 2022-11-29 DIAGNOSIS — G89.29 CHRONIC RIGHT SHOULDER PAIN: Primary | ICD-10-CM

## 2022-11-29 DIAGNOSIS — M25.511 CHRONIC RIGHT SHOULDER PAIN: Primary | ICD-10-CM

## 2022-11-29 PROCEDURE — 97110 THERAPEUTIC EXERCISES: CPT | Mod: GP | Performed by: PHYSICAL THERAPIST

## 2022-11-29 PROCEDURE — 97112 NEUROMUSCULAR REEDUCATION: CPT | Mod: GP | Performed by: PHYSICAL THERAPIST

## 2022-12-06 ENCOUNTER — THERAPY VISIT (OUTPATIENT)
Dept: PHYSICAL THERAPY | Facility: CLINIC | Age: 27
End: 2022-12-06
Payer: COMMERCIAL

## 2022-12-06 DIAGNOSIS — M25.511 CHRONIC RIGHT SHOULDER PAIN: Primary | ICD-10-CM

## 2022-12-06 DIAGNOSIS — G89.29 CHRONIC RIGHT SHOULDER PAIN: Primary | ICD-10-CM

## 2022-12-06 PROCEDURE — 97110 THERAPEUTIC EXERCISES: CPT | Mod: GP | Performed by: PHYSICAL THERAPIST

## 2022-12-21 ENCOUNTER — THERAPY VISIT (OUTPATIENT)
Dept: PHYSICAL THERAPY | Facility: CLINIC | Age: 27
End: 2022-12-21
Payer: COMMERCIAL

## 2022-12-21 DIAGNOSIS — G89.29 CHRONIC RIGHT SHOULDER PAIN: Primary | ICD-10-CM

## 2022-12-21 DIAGNOSIS — M25.511 CHRONIC RIGHT SHOULDER PAIN: Primary | ICD-10-CM

## 2022-12-21 PROCEDURE — 97110 THERAPEUTIC EXERCISES: CPT | Mod: GP | Performed by: PHYSICAL THERAPIST

## 2022-12-21 PROCEDURE — 97112 NEUROMUSCULAR REEDUCATION: CPT | Mod: GP | Performed by: PHYSICAL THERAPIST

## 2023-01-02 ENCOUNTER — THERAPY VISIT (OUTPATIENT)
Dept: PHYSICAL THERAPY | Facility: CLINIC | Age: 28
End: 2023-01-02
Payer: COMMERCIAL

## 2023-01-02 DIAGNOSIS — G89.29 CHRONIC RIGHT SHOULDER PAIN: Primary | ICD-10-CM

## 2023-01-02 DIAGNOSIS — M25.511 CHRONIC RIGHT SHOULDER PAIN: Primary | ICD-10-CM

## 2023-01-02 PROCEDURE — 97110 THERAPEUTIC EXERCISES: CPT | Mod: GP | Performed by: PHYSICAL THERAPIST

## 2023-01-02 PROCEDURE — 97140 MANUAL THERAPY 1/> REGIONS: CPT | Mod: GP | Performed by: PHYSICAL THERAPIST

## 2023-02-23 ENCOUNTER — MYC MEDICAL ADVICE (OUTPATIENT)
Dept: INTERNAL MEDICINE | Facility: CLINIC | Age: 28
End: 2023-02-23
Payer: COMMERCIAL

## 2023-02-23 DIAGNOSIS — Z91.89 AT RISK FOR SEXUALLY TRANSMITTED DISEASE DUE TO PARTNER WITH MULTIPLE PARTNERS: Primary | ICD-10-CM

## 2023-02-23 DIAGNOSIS — N52.9 ERECTILE DYSFUNCTION, UNSPECIFIED ERECTILE DYSFUNCTION TYPE: ICD-10-CM

## 2023-02-23 DIAGNOSIS — Z11.3 SCREEN FOR STD (SEXUALLY TRANSMITTED DISEASE): ICD-10-CM

## 2023-02-23 RX ORDER — SILDENAFIL CITRATE 20 MG/1
20-40 TABLET ORAL DAILY PRN
Qty: 30 TABLET | Refills: 3 | Status: SHIPPED | OUTPATIENT
Start: 2023-02-23

## 2023-03-02 ENCOUNTER — LAB (OUTPATIENT)
Dept: LAB | Facility: CLINIC | Age: 28
End: 2023-03-02
Payer: COMMERCIAL

## 2023-03-02 DIAGNOSIS — Z91.89 AT RISK FOR SEXUALLY TRANSMITTED DISEASE DUE TO PARTNER WITH MULTIPLE PARTNERS: ICD-10-CM

## 2023-03-02 DIAGNOSIS — Z11.3 SCREEN FOR STD (SEXUALLY TRANSMITTED DISEASE): ICD-10-CM

## 2023-03-02 LAB
C TRACH DNA SPEC QL NAA+PROBE: NEGATIVE
N GONORRHOEA DNA SPEC QL NAA+PROBE: NEGATIVE
T PALLIDUM AB SER QL: NONREACTIVE

## 2023-03-02 PROCEDURE — 87591 N.GONORRHOEAE DNA AMP PROB: CPT

## 2023-03-02 PROCEDURE — 86780 TREPONEMA PALLIDUM: CPT

## 2023-03-02 PROCEDURE — 36415 COLL VENOUS BLD VENIPUNCTURE: CPT

## 2023-03-02 PROCEDURE — 87389 HIV-1 AG W/HIV-1&-2 AB AG IA: CPT

## 2023-03-02 PROCEDURE — 87491 CHLMYD TRACH DNA AMP PROBE: CPT

## 2023-03-03 LAB — HIV 1+2 AB+HIV1 P24 AG SERPL QL IA: NONREACTIVE

## 2023-08-10 ENCOUNTER — MYC MEDICAL ADVICE (OUTPATIENT)
Dept: INTERNAL MEDICINE | Facility: CLINIC | Age: 28
End: 2023-08-10
Payer: COMMERCIAL

## 2023-08-10 DIAGNOSIS — Z11.3 SCREEN FOR STD (SEXUALLY TRANSMITTED DISEASE): Primary | ICD-10-CM

## 2023-08-14 ENCOUNTER — LAB (OUTPATIENT)
Dept: LAB | Facility: CLINIC | Age: 28
End: 2023-08-14
Payer: COMMERCIAL

## 2023-08-14 DIAGNOSIS — Z11.3 SCREEN FOR STD (SEXUALLY TRANSMITTED DISEASE): ICD-10-CM

## 2023-08-14 PROCEDURE — 36415 COLL VENOUS BLD VENIPUNCTURE: CPT

## 2023-08-14 PROCEDURE — 87340 HEPATITIS B SURFACE AG IA: CPT

## 2023-08-14 PROCEDURE — 87591 N.GONORRHOEAE DNA AMP PROB: CPT

## 2023-08-14 PROCEDURE — 87389 HIV-1 AG W/HIV-1&-2 AB AG IA: CPT

## 2023-08-14 PROCEDURE — 86780 TREPONEMA PALLIDUM: CPT

## 2023-08-14 PROCEDURE — 86704 HEP B CORE ANTIBODY TOTAL: CPT

## 2023-08-14 PROCEDURE — 87491 CHLMYD TRACH DNA AMP PROBE: CPT

## 2023-08-14 PROCEDURE — 86803 HEPATITIS C AB TEST: CPT

## 2023-08-14 PROCEDURE — 86706 HEP B SURFACE ANTIBODY: CPT

## 2023-08-15 LAB
HBV CORE AB SERPL QL IA: NONREACTIVE
HBV SURFACE AB SERPL IA-ACNC: 0.25 M[IU]/ML
HBV SURFACE AB SERPL IA-ACNC: NONREACTIVE M[IU]/ML
HBV SURFACE AG SERPL QL IA: NONREACTIVE
HCV AB SERPL QL IA: NONREACTIVE
HIV 1+2 AB+HIV1 P24 AG SERPL QL IA: NONREACTIVE
N GONORRHOEA DNA SPEC QL NAA+PROBE: NEGATIVE
T PALLIDUM AB SER QL: NONREACTIVE

## 2023-08-18 LAB — C TRACH DNA SPEC QL NAA+PROBE: NEGATIVE

## 2023-11-15 ENCOUNTER — ALLIED HEALTH/NURSE VISIT (OUTPATIENT)
Dept: INTERNAL MEDICINE | Facility: CLINIC | Age: 28
End: 2023-11-15
Payer: COMMERCIAL

## 2023-11-15 DIAGNOSIS — Z23 NEED FOR VACCINATION: Primary | ICD-10-CM

## 2023-11-15 PROCEDURE — 90471 IMMUNIZATION ADMIN: CPT

## 2023-11-15 PROCEDURE — 90686 IIV4 VACC NO PRSV 0.5 ML IM: CPT

## 2023-11-15 PROCEDURE — 99207 PR NO CHARGE NURSE ONLY: CPT

## 2023-11-15 PROCEDURE — 90480 ADMN SARSCOV2 VAC 1/ONLY CMP: CPT

## 2023-11-15 PROCEDURE — 91320 SARSCV2 VAC 30MCG TRS-SUC IM: CPT

## 2023-11-15 NOTE — PROGRESS NOTES
Miguel Angel Hernandez received the COVID and Flu vaccination today in clinic at the request of the patient. The immunization was given under the supervision of Dr. Gage if assistance was needed. The patient does not report a history of adverse reactions associated with vaccine administration. The immunization site was cleaned with an alcohol prep wipe. The immunization was given without incident--see immunization list for administration details. No swelling or redness was observed at the site of injection after the immunization was given. The patient was advised to remain in fourth floor lobby of the [facility] for fifteen minutes after the injection in case of an adverse reaction.       JUAN Rodriguez at 10:29 AM on 11/15/2023.

## 2023-11-16 DIAGNOSIS — R05.9 COUGH, UNSPECIFIED TYPE: ICD-10-CM

## 2023-11-16 DIAGNOSIS — R10.2 PELVIC PAIN IN MALE: ICD-10-CM

## 2023-11-16 DIAGNOSIS — G89.29 CHRONIC RIGHT SHOULDER PAIN: ICD-10-CM

## 2023-11-16 DIAGNOSIS — M25.511 CHRONIC RIGHT SHOULDER PAIN: ICD-10-CM

## 2023-11-16 DIAGNOSIS — Z11.3 SCREEN FOR STD (SEXUALLY TRANSMITTED DISEASE): ICD-10-CM

## 2023-11-20 RX ORDER — DEXAMETHASONE 4 MG/1
TABLET ORAL
Qty: 12 G | Refills: 1 | Status: SHIPPED | OUTPATIENT
Start: 2023-11-20 | End: 2024-02-07

## 2023-11-20 NOTE — TELEPHONE ENCOUNTER
FLOVENT  MCG/ACT inhaler 12 g 3 10/4/2022  Last Office Visit : 11/15/2022   Future Office visit:  2/7/2024     Routing refill request to provider for review/approval because:  Per protocol, need ACT on file. Overdue for Follow-up appointment, visit scheduled 2/7/24.   Rx pended.

## 2023-11-26 ENCOUNTER — HEALTH MAINTENANCE LETTER (OUTPATIENT)
Age: 28
End: 2023-11-26

## 2023-12-26 DIAGNOSIS — R10.2 PELVIC PAIN IN MALE: ICD-10-CM

## 2023-12-26 DIAGNOSIS — R05.9 COUGH, UNSPECIFIED TYPE: ICD-10-CM

## 2023-12-26 DIAGNOSIS — M25.511 CHRONIC RIGHT SHOULDER PAIN: ICD-10-CM

## 2023-12-26 DIAGNOSIS — Z11.3 SCREEN FOR STD (SEXUALLY TRANSMITTED DISEASE): ICD-10-CM

## 2023-12-26 DIAGNOSIS — G89.29 CHRONIC RIGHT SHOULDER PAIN: ICD-10-CM

## 2023-12-29 DIAGNOSIS — R05.9 COUGH, UNSPECIFIED TYPE: ICD-10-CM

## 2023-12-29 RX ORDER — FLUTICASONE PROPIONATE 110 UG/1
AEROSOL, METERED RESPIRATORY (INHALATION)
Qty: 12 G | Refills: 3 | Status: SHIPPED | OUTPATIENT
Start: 2023-12-29 | End: 2024-01-10

## 2023-12-29 RX ORDER — DEXAMETHASONE 4 MG/1
TABLET ORAL
OUTPATIENT
Start: 2023-12-29

## 2023-12-29 NOTE — TELEPHONE ENCOUNTER
FLOVENT  MCG INHALER     Pharmacy comment: Alternative Requested:BRAND NAME DRUG IS NOT BEING MADE ANY MORE PLEASE ADVISE.       Shelly Burnette RN  Central Triage Red Flags/Med Refills

## 2024-01-02 ENCOUNTER — TELEPHONE (OUTPATIENT)
Dept: INTERNAL MEDICINE | Facility: CLINIC | Age: 29
End: 2024-01-02

## 2024-01-02 DIAGNOSIS — J45.909 ASTHMA: Primary | ICD-10-CM

## 2024-01-02 DIAGNOSIS — R05.9 COUGH, UNSPECIFIED TYPE: ICD-10-CM

## 2024-01-02 RX ORDER — FLUTICASONE PROPIONATE 110 UG/1
AEROSOL, METERED RESPIRATORY (INHALATION)
Refills: 3 | OUTPATIENT
Start: 2024-01-02

## 2024-01-02 NOTE — TELEPHONE ENCOUNTER
Prior Authorization Retail Medication Request    Medication/Dose: fluticasone (FLOVENT HFA) 110 MCG/ACT inhaler  Rationale:  Needs pa for GENERIC as NAME BRAND FLOVENT is discontinued.     Insurance   Primary:

## 2024-01-04 NOTE — TELEPHONE ENCOUNTER
Central Prior Authorization Team   Phone: [phone]    PA Initiation    Medication: FLUTICASONE PROPIONATE  MCG/ACT IN AERO  Insurance Company: [employer] - Phone [phone] Fax [phone]  Pharmacy Filling the Rx: [id number] - [place], [place] - [address]  Filling Pharmacy Phone: [phone]  Filling Pharmacy Fax:    Start Date: 1/4/2024

## 2024-01-08 NOTE — TELEPHONE ENCOUNTER
PRIOR AUTHORIZATION DENIED    Medication: FLUTICASONE PROPIONATE  MCG/ACT IN AERO  Insurance Company: [employer] - Phone [phone] Fax [phone]  Denial Date: 1/6/2024  Denial Rational:           Appeal Information:   If provider would like to appeal please review the plan's reasons for denial listed above. Please utilize that information to complete letter and provide specific, detailed clinical information/rationale of your patient's health status to address their denial reasons.        Patient Notified: No

## 2024-01-08 NOTE — TELEPHONE ENCOUNTER
Fluticasone Propionate  MCG/ACT Inhalation Aerosol (FLOVENT HFA)          Routing refill request to provider for review/approval because:  Pharmacy comment: Alternative Requested:DRUG NOT COVERED. PLEASE TRY ALTERNATIVE.

## 2024-01-09 DIAGNOSIS — R21 RASH: Primary | ICD-10-CM

## 2024-01-09 NOTE — TELEPHONE ENCOUNTER
"Clindamycin 1 % gel     Last Written Prescription Date:  Not active on list  Last Fill Quantity: NA  Last Office Visit : 11/15/22  Future Office visit:  2/7/24    Routing refill request to provider for review/approval because:  Drug not active on patient's medication list nor found in EPIC history. Last seen 11/15/22, has upcoming appt 2/7/24 with Dr Too Gardner  notes 1/28/21\"He has a dermatologist who has him on benzoyl peroxide washes daily and Clindamycin gel as needed. \" No specific directions found on chart review. Pended with blank SIG  "

## 2024-01-10 DIAGNOSIS — J45.909 ASTHMA: ICD-10-CM

## 2024-01-10 RX ORDER — FLUTICASONE PROPIONATE 110 UG/1
AEROSOL, METERED RESPIRATORY (INHALATION)
Qty: 12 G | Refills: 3 | Status: SHIPPED | OUTPATIENT
Start: 2024-01-10 | End: 2024-01-12

## 2024-01-11 RX ORDER — CLINDAMYCIN PHOSPHATE 10 MG/G
GEL TOPICAL 2 TIMES DAILY
Qty: 100 ML | Refills: 1 | Status: SHIPPED | OUTPATIENT
Start: 2024-01-11 | End: 2024-06-10

## 2024-01-11 NOTE — TELEPHONE ENCOUNTER
PRIOR AUTHORIZATION DENIED    Medication: FLUTICASONE PROPIONATE  MCG/ACT IN AERO  Insurance Company: [employer] - Phone [phone] Fax [phone]  Denial Date: 1/11/2024  Denial Rational:         Appeal Information:   If provider would like to appeal please review the plan's reasons for denial listed above. Please utilize that information to complete letter and provide specific, detailed clinical information/rationale of your patient's health status to address their denial reasons.          Patient Notified: No

## 2024-01-12 DIAGNOSIS — J45.909 ASTHMA: ICD-10-CM

## 2024-01-12 RX ORDER — FLUTICASONE PROPIONATE 110 UG/1
AEROSOL, METERED RESPIRATORY (INHALATION)
Qty: 1 G | Refills: 3 | Status: SHIPPED | OUTPATIENT
Start: 2024-01-12 | End: 2024-02-12

## 2024-01-12 NOTE — TELEPHONE ENCOUNTER
FLUTICASONE PROP  MCG   Pharmacy comment: Alternative Requested:DRUG NOT COVERED. PLEASE TRY ALTERNATIVE OR CONTACT INSURANCE FOR COVERAGE.

## 2024-01-17 RX ORDER — FLUTICASONE PROPIONATE 110 UG/1
AEROSOL, METERED RESPIRATORY (INHALATION)
Refills: 3 | OUTPATIENT
Start: 2024-01-17

## 2024-01-23 ENCOUNTER — TRANSFERRED RECORDS (OUTPATIENT)
Dept: HEALTH INFORMATION MANAGEMENT | Facility: CLINIC | Age: 29
End: 2024-01-23

## 2024-01-23 ENCOUNTER — TELEPHONE (OUTPATIENT)
Dept: INTERNAL MEDICINE | Facility: CLINIC | Age: 29
End: 2024-01-23
Payer: COMMERCIAL

## 2024-01-23 NOTE — TELEPHONE ENCOUNTER
[facility] Call Center    Phone Message    May a detailed message be left on voicemail: yes     Reason for Call: Other: Patient calling stating he has an upcoming hospital follow up scheduled with the next available provider, patient does not believe that is a good fit due to his medical issues. Patient requesting a return call from Dr. Gage or his nurse to discuss his care. Patient declined to schedule with any other provider as writer offered.      Action Taken: Message routed to:  [facility]    Travel Screening: Not Applicable                                                                     (4) Less than 3 years old

## 2024-01-24 NOTE — TELEPHONE ENCOUNTER
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 We talked for awhile about things to look out for and be cautious about. Reviewed ER visit- they instructed him it was ok to fly and complete this. I agreed with this assessment. I reviewed some of the imaging results with him, giving caveat that I am just going on impression and not an MD. Showed two clots, one that appeared chronic. He is wondering if this could be from all the international travel lately or from past injury, I said this is possible. He is wondering if he should take any precautions for upcoming hour long/ two hour long flight. Other than taking xarelto as rx'd, he is wearing compression stockings and recommended he try to get up at least every hour while sitting to encourage blood flow. He is wondering about symptoms to look out for- any shortness of breath at rest not resolved by inhaler, increasing pain or redness in the leg, chest pain, or focal neuro symptoms would require ER visit. Also recommended to be evaluated if he has a fall and hits his head. Encouraged using a helmet while snowboarding. He is wondering about bleeding precautions- any large amount of blood while coughing or with stool should be evaluated. Encouraged being careful with oral cares but gum bleeding is not emergent. He says legs already feel better today. He will see us next week on Wednesday. He did mention feeling a little foggy today but I reassured him this is most likely unrelated without other symptoms.     Lenard Meeks RN on 1/24/2024 at 3:01 PM

## 2024-01-31 ENCOUNTER — OFFICE VISIT (OUTPATIENT)
Dept: INTERNAL MEDICINE | Facility: CLINIC | Age: 29
End: 2024-01-31
Payer: COMMERCIAL

## 2024-01-31 ENCOUNTER — LAB (OUTPATIENT)
Dept: LAB | Facility: CLINIC | Age: 29
End: 2024-01-31
Payer: COMMERCIAL

## 2024-01-31 VITALS
DIASTOLIC BLOOD PRESSURE: 82 MMHG | SYSTOLIC BLOOD PRESSURE: 128 MMHG | BODY MASS INDEX: 37.88 KG/M2 | OXYGEN SATURATION: 98 % | HEART RATE: 86 BPM | WEIGHT: 249.1 LBS

## 2024-01-31 DIAGNOSIS — R74.8 ELEVATED LIVER ENZYMES: ICD-10-CM

## 2024-01-31 DIAGNOSIS — Z11.3 SCREEN FOR STD (SEXUALLY TRANSMITTED DISEASE): ICD-10-CM

## 2024-01-31 DIAGNOSIS — M79.89 LEG SWELLING: ICD-10-CM

## 2024-01-31 DIAGNOSIS — I82.499 DEEP VEIN THROMBOSIS (DVT) OF OTHER VEIN OF LOWER EXTREMITY, UNSPECIFIED CHRONICITY, UNSPECIFIED LATERALITY (H): ICD-10-CM

## 2024-01-31 DIAGNOSIS — I82.499 DEEP VEIN THROMBOSIS (DVT) OF OTHER VEIN OF LOWER EXTREMITY, UNSPECIFIED CHRONICITY, UNSPECIFIED LATERALITY (H): Primary | ICD-10-CM

## 2024-01-31 LAB
ALBUMIN SERPL BCG-MCNC: 4.6 G/DL (ref 3.5–5.2)
ALP SERPL-CCNC: 87 U/L (ref 40–150)
ALT SERPL W P-5'-P-CCNC: 90 U/L (ref 0–70)
ANION GAP SERPL CALCULATED.3IONS-SCNC: 9 MMOL/L (ref 7–15)
AST SERPL W P-5'-P-CCNC: 43 U/L (ref 0–45)
BASOPHILS # BLD AUTO: 0.1 10E3/UL (ref 0–0.2)
BASOPHILS NFR BLD AUTO: 1 %
BILIRUB SERPL-MCNC: 0.4 MG/DL
BUN SERPL-MCNC: 19.8 MG/DL (ref 6–20)
CALCIUM SERPL-MCNC: 9.3 MG/DL (ref 8.6–10)
CHLORIDE SERPL-SCNC: 107 MMOL/L (ref 98–107)
CREAT SERPL-MCNC: 1.08 MG/DL (ref 0.67–1.17)
DEPRECATED HCO3 PLAS-SCNC: 27 MMOL/L (ref 22–29)
EGFRCR SERPLBLD CKD-EPI 2021: >90 ML/MIN/1.73M2
EOSINOPHIL # BLD AUTO: 0.3 10E3/UL (ref 0–0.7)
EOSINOPHIL NFR BLD AUTO: 4 %
ERYTHROCYTE [DISTWIDTH] IN BLOOD BY AUTOMATED COUNT: 12.5 % (ref 10–15)
GLUCOSE SERPL-MCNC: 104 MG/DL (ref 70–99)
HCT VFR BLD AUTO: 46.7 % (ref 40–53)
HGB BLD-MCNC: 16.1 G/DL (ref 13.3–17.7)
IMM GRANULOCYTES # BLD: 0 10E3/UL
IMM GRANULOCYTES NFR BLD: 0 %
LYMPHOCYTES # BLD AUTO: 2.6 10E3/UL (ref 0.8–5.3)
LYMPHOCYTES NFR BLD AUTO: 36 %
MCH RBC QN AUTO: 29.3 PG (ref 26.5–33)
MCHC RBC AUTO-ENTMCNC: 34.5 G/DL (ref 31.5–36.5)
MCV RBC AUTO: 85 FL (ref 78–100)
MONOCYTES # BLD AUTO: 0.6 10E3/UL (ref 0–1.3)
MONOCYTES NFR BLD AUTO: 8 %
N GONORRHOEA DNA SPEC QL NAA+PROBE: NEGATIVE
NEUTROPHILS # BLD AUTO: 3.7 10E3/UL (ref 1.6–8.3)
NEUTROPHILS NFR BLD AUTO: 51 %
NRBC # BLD AUTO: 0 10E3/UL
NRBC BLD AUTO-RTO: 0 /100
PLATELET # BLD AUTO: 282 10E3/UL (ref 150–450)
POTASSIUM SERPL-SCNC: 4 MMOL/L (ref 3.4–5.3)
PROT SERPL-MCNC: 6.9 G/DL (ref 6.4–8.3)
RBC # BLD AUTO: 5.49 10E6/UL (ref 4.4–5.9)
SODIUM SERPL-SCNC: 143 MMOL/L (ref 135–145)
WBC # BLD AUTO: 7.2 10E3/UL (ref 4–11)

## 2024-01-31 PROCEDURE — 36415 COLL VENOUS BLD VENIPUNCTURE: CPT | Performed by: PATHOLOGY

## 2024-01-31 PROCEDURE — 99214 OFFICE O/P EST MOD 30 MIN: CPT | Performed by: INTERNAL MEDICINE

## 2024-01-31 PROCEDURE — 99000 SPECIMEN HANDLING OFFICE-LAB: CPT | Performed by: PATHOLOGY

## 2024-01-31 PROCEDURE — 85025 COMPLETE CBC W/AUTO DIFF WBC: CPT | Performed by: PATHOLOGY

## 2024-01-31 PROCEDURE — 80053 COMPREHEN METABOLIC PANEL: CPT | Performed by: PATHOLOGY

## 2024-01-31 PROCEDURE — 87591 N.GONORRHOEAE DNA AMP PROB: CPT | Performed by: INTERNAL MEDICINE

## 2024-01-31 ASSESSMENT — ASTHMA QUESTIONNAIRES: ACT_TOTALSCORE: 24

## 2024-01-31 NOTE — PROGRESS NOTES
Lenard is a 28 year old that presents in clinic today for the following. He was seen in the ED 1/23/2024 for L calf pain and had dx. New DVT and started on Rivaroxaban (now still 15 mg BID). Prior to this he was traveling quite a bit with long plane rides. He does vape. No strong family h/o hyper-coagulable disorders. He remains on Methylphenidate for ADHD and this is beneficial and follows with outside Psychiatry. He denies any SOB. No other HEENT, cardiopulmonary, abdominal, , neurological, systemic, psychiatric, lymphatic, endocrine, vascular complaints.     Chief Complaint   Patient presents with    Hospital F/U   Prescription for clindamycin lotion         1/31/2024    10:29 AM   Additional Questions   Roomed by YW   Accompanied by No     Screenings from encounters over the past 10 days    No data recorded       Mehul Shipman, EMT at 10:30 AM on 1/31/2024      HPI:    Past Medical History:   Diagnosis Date    ADHD     Asthma     Dyslipidemia     Obesity      Past Surgical History:   Procedure Laterality Date    TONSILLECTOMY Bilateral     childhood     PE:    Vitals noted, gen nad, cooperative, alert, neck supple nl rom, lungs with good air movement, RRR, S1, S2,. No MRG, abdomen, no acute findings. Grossly normal neurological exam. L calf w/o tenderness, no erythema, minimal swelling.       US VENOUS LOWER EXTREMITY LEFT  Order: [id number]  Addendum    Addendum by Celestino Coyle MD on 01/23/2024  1:54 PM CST  INDICATION:  Leg pain.    TECHNIQUE:  Ultrasound venous duplex lower right extremity. Compression venous exam  was performed using gray-scale, color Doppler, and spectral Doppler  analysis.    COMPARISON:  None.    FINDINGS:  Deep veins: Acute appearing nonocclusive thrombus in one of the two  peroneal veins and chronic appearing, nonocclusive thrombus in the other  visualized peroneal vein. The right common femoral, deep femoral,  superficial femoral, popliteal, posterior tibial, and the  contralateral  left common femoral veins are fully compressible with normal color Doppler  blood flow.    No popliteal cyst.    IMPRESSION:  Acute appearing, nonocclusive thrombus in one of the two peroneal veins  and a chronic appearing, nonocclusive thrombus in the other visualized  peroneal vein.        Dictated by Celestino Coyle MD @ 1/23/2024 1:08:13 PM    ----- ADDENDUM -----    Findings were discussed with Dr. Paula Ballard by Dr. Coyle at 1:09 p.m.  central standard time on 01/23/2024    Dictated by Celestino Coyle MD @ Jan 23 2024  1:10PM    ----- ADDENDUM -----    The original report incorrectly stated that the DVTs in the peroneal veins  are nonocclusive. The report should read that the DVTs in the peroneal  veins are occlusive.    Results for orders placed or performed in visit on 01/31/24   Comprehensive metabolic panel     Status: Abnormal   Result Value Ref Range    Sodium 143 135 - 145 mmol/L    Potassium 4.0 3.4 - 5.3 mmol/L    Carbon Dioxide (CO2) 27 22 - 29 mmol/L    Anion Gap 9 7 - 15 mmol/L    Urea Nitrogen 19.8 6.0 - 20.0 mg/dL    Creatinine 1.08 0.67 - 1.17 mg/dL    GFR Estimate >90 >60 mL/min/1.73m2    Calcium 9.3 8.6 - 10.0 mg/dL    Chloride 107 98 - 107 mmol/L    Glucose 104 (H) 70 - 99 mg/dL    Alkaline Phosphatase 87 40 - 150 U/L    AST 43 0 - 45 U/L    ALT 90 (H) 0 - 70 U/L    Protein Total 6.9 6.4 - 8.3 g/dL    Albumin 4.6 3.5 - 5.2 g/dL    Bilirubin Total 0.4 <=1.2 mg/dL   CBC with platelets and differential     Status: None   Result Value Ref Range    WBC Count 7.2 4.0 - 11.0 10e3/uL    RBC Count 5.49 4.40 - 5.90 10e6/uL    Hemoglobin 16.1 13.3 - 17.7 g/dL    Hematocrit 46.7 40.0 - 53.0 %    MCV 85 78 - 100 fL    MCH 29.3 26.5 - 33.0 pg    MCHC 34.5 31.5 - 36.5 g/dL    RDW 12.5 10.0 - 15.0 %    Platelet Count 282 150 - 450 10e3/uL    % Neutrophils 51 %    % Lymphocytes 36 %    % Monocytes 8 %    % Eosinophils 4 %    % Basophils 1 %    % Immature Granulocytes 0 %    NRBCs per 100 WBC 0 <1  /100    Absolute Neutrophils 3.7 1.6 - 8.3 10e3/uL    Absolute Lymphocytes 2.6 0.8 - 5.3 10e3/uL    Absolute Monocytes 0.6 0.0 - 1.3 10e3/uL    Absolute Eosinophils 0.3 0.0 - 0.7 10e3/uL    Absolute Basophils 0.1 0.0 - 0.2 10e3/uL    Absolute Immature Granulocytes 0.0 <=0.4 10e3/uL    Absolute NRBCs 0.0 10e3/uL   CBC with platelets and differential     Status: None    Narrative    The following orders were created for panel order CBC with platelets and differential.  Procedure                               Abnormality         Status                     ---------                               -----------         ------                     CBC with platelets and d...[[id number]]                      Final result                 Please view results for these tests on the individual orders.       A/P:    1. Immunizations; Pfizer COVID vacine x 4. Tdap 7/13/2015. Influenza vaccine 11/15/2023.    2. Abdominal/groin pain; He had an unremarkable testicular U/S 9/8/2022. He saw Dr. De Oliveira, Urology 10/29/2020 for a similar complaint. CT abdominal/pelvic imaging was done 10/4/2022. Dedicated pelvic MRI imagining  Was done 11/4/2022 and unremarkable.  Referred back to Dr. De Oliveira, Urology on 10/18/2022. UA was normal 11/4/2022  3. Lipids;  10/4/2022 , TG's 145 and HDL 41.   4. Seen by Dr. Chin, Wt. Management 2/14/2022.   5. Seen Dr. Cotto, GI 1/18/2022 for hepatic steatosis. ALT was 61 on 10/4/2022. Ordered labs today 1/31/2024.   6. Sleep Medicine appt. With Dr. Zuniga 1/5/2022  7. ADHD followed by outside Psychiatry. He is on Methylphenidate.   8. STD testing; no abnormalities on 8/14/2023.   9. New DVT; ordered labs, repeat L LE U/S and hematology referral. Sent in Rx. For 20 mg Rivaroxaban.     30 minutes spent on the date of the encounter doing chart review, history and exam, documentation and further activities as noted above exclusive of procedures and other billable interpretations

## 2024-02-01 ENCOUNTER — TELEPHONE (OUTPATIENT)
Dept: INTERNAL MEDICINE | Facility: CLINIC | Age: 29
End: 2024-02-01
Payer: COMMERCIAL

## 2024-02-02 ENCOUNTER — ANCILLARY PROCEDURE (OUTPATIENT)
Dept: ULTRASOUND IMAGING | Facility: CLINIC | Age: 29
End: 2024-02-02
Attending: INTERNAL MEDICINE
Payer: COMMERCIAL

## 2024-02-02 DIAGNOSIS — M79.89 LEG SWELLING: ICD-10-CM

## 2024-02-02 DIAGNOSIS — I82.499 DEEP VEIN THROMBOSIS (DVT) OF OTHER VEIN OF LOWER EXTREMITY, UNSPECIFIED CHRONICITY, UNSPECIFIED LATERALITY (H): ICD-10-CM

## 2024-02-02 DIAGNOSIS — R74.8 ELEVATED LIVER ENZYMES: ICD-10-CM

## 2024-02-02 PROCEDURE — 93971 EXTREMITY STUDY: CPT | Mod: LT | Performed by: RADIOLOGY

## 2024-02-06 NOTE — PROGRESS NOTES
HPI:    Mr. Hernandez comes in for a physical today. He states L calf pain is less now. He has hematology follow up with Kailash Montaño 2/23/2024 for new dx. Of DVT. He has several months of L knee pain worse with certain movements. He has chronic waist acne. He possibly had an EKG with prolonged QTc? He still has pelvic pain and had imaging late 2022. No other HEENT, cardiopulmonary, abdominal, , neurological, systemic, psychiatric, lymphatic, endocrine, vascular complaints.     Past Medical History:   Diagnosis Date    ADHD     Asthma     Dyslipidemia     Obesity      Past Medical History:   Diagnosis Date    ADHD     Asthma     Dyslipidemia     Obesity      PE:    Vitals noted, gen, nad, cooperative, alert, neck supple nl rom, lungs with good air movement, clear, no wheezing, RRR, S1, S2, No MRG, abdomen, no acute findings. Grossly normal neurological exam.     Recent Results (from the past 720 hour(s))   Comprehensive metabolic panel    Collection Time: 01/31/24 11:13 AM   Result Value Ref Range    Sodium 143 135 - 145 mmol/L    Potassium 4.0 3.4 - 5.3 mmol/L    Carbon Dioxide (CO2) 27 22 - 29 mmol/L    Anion Gap 9 7 - 15 mmol/L    Urea Nitrogen 19.8 6.0 - 20.0 mg/dL    Creatinine 1.08 0.67 - 1.17 mg/dL    GFR Estimate >90 >60 mL/min/1.73m2    Calcium 9.3 8.6 - 10.0 mg/dL    Chloride 107 98 - 107 mmol/L    Glucose 104 (H) 70 - 99 mg/dL    Alkaline Phosphatase 87 40 - 150 U/L    AST 43 0 - 45 U/L    ALT 90 (H) 0 - 70 U/L    Protein Total 6.9 6.4 - 8.3 g/dL    Albumin 4.6 3.5 - 5.2 g/dL    Bilirubin Total 0.4 <=1.2 mg/dL   CBC with platelets and differential    Collection Time: 01/31/24 11:13 AM   Result Value Ref Range    WBC Count 7.2 4.0 - 11.0 10e3/uL    RBC Count 5.49 4.40 - 5.90 10e6/uL    Hemoglobin 16.1 13.3 - 17.7 g/dL    Hematocrit 46.7 40.0 - 53.0 %    MCV 85 78 - 100 fL    MCH 29.3 26.5 - 33.0 pg    MCHC 34.5 31.5 - 36.5 g/dL    RDW 12.5 10.0 - 15.0 %    Platelet Count 282 150 - 450 10e3/uL    % Neutrophils  "51 %    % Lymphocytes 36 %    % Monocytes 8 %    % Eosinophils 4 %    % Basophils 1 %    % Immature Granulocytes 0 %    NRBCs per 100 WBC 0 <1 /100    Absolute Neutrophils 3.7 1.6 - 8.3 10e3/uL    Absolute Lymphocytes 2.6 0.8 - 5.3 10e3/uL    Absolute Monocytes 0.6 0.0 - 1.3 10e3/uL    Absolute Eosinophils 0.3 0.0 - 0.7 10e3/uL    Absolute Basophils 0.1 0.0 - 0.2 10e3/uL    Absolute Immature Granulocytes 0.0 <=0.4 10e3/uL    Absolute NRBCs 0.0 10e3/uL   NEISSERIA GONORRHOEA PCR    Collection Time: 01/31/24 11:20 AM    Specimen: Urine, Voided   Result Value Ref Range    Neisseria gonorrhoeae Negative Negative     US Lower Extremity Venous Duplex Left  Narrative: ULTRASOUND LOWER EXTREMITY VENOUS DUPLEX LEFT 2/2/2024 10:53 AM    CLINICAL HISTORY: History of left deep venous thrombosis. On  anticoagulation.    COMPARISONS: None available.    REFERRING PROVIDER: LAUREN GUO W    TECHNIQUE: Grayscale, color Doppler, Doppler waveform ultrasound  evaluation was performed through the left common femoral, femoral, and  popliteal veins. Left posterior tibial and peroneal veins were  evaluated with grayscale imaging and compression.    Right common femoral vein was evaluated for symmetry.    FINDINGS: Right common femoral vein is patent, fully compressible, and  demonstrates normal phasic Doppler waveform.    Left common femoral, femoral, and popliteal veins are fully  compressible, patent, and demonstrate normal phasic Doppler waveforms.    Left posterior tibial veins are fully compressible to the ankle.    Left peroneal veins are partially compressible in the mid calf and  fully compressible in the proximal and distal  calf.  Impression: IMPRESSION:  1. Left peroneal non occlusive deep venous thrombosis or chronic  venous changes in the mid calf.    2. No left above knee deep venous thrombosis demonstrated.    Reference: \"Duplex Ultrasound in the Diagnosis of Lower-Extremity Deep  Venous Thrombosis\"- Di Acosta " MD, S; Bib Leslie MD  (Circulation. 2014;129:917-921. http://circ.ahajournals.org)    ENID CRABTREE MD         SYSTEM ID:  [id number]    Results for orders placed or performed in visit on 02/07/24   XR Knee Left 3 Views     Status: None    Narrative    3 views left knee radiographs 2/7/2024 10:28 AM    History: Pelvic pain in male; Chronic right shoulder pain; Chronic  right shoulder pain; Screen for STD (sexually transmitted disease);  Cough, unspecified type; Acne, unspecified acne type    Comparison: None    Findings:    AP, lateral and patellofemoral views of the left knee were obtained.     No acute osseous abnormality.  No significant joint effusion.    No substantial degenerative change.    No patellar tilt or lateral subluxation.  Soft tissue is unremarkable.      Impression    Impression:  1. No acute osseous abnormality.  2. No substantial degenerative change.    I have personally reviewed the examination and initial interpretation  and I agree with the findings.    SYL POLK         SYSTEM ID:  [id number]   Results for orders placed or performed in visit on 02/07/24   EKG 12-lead complete w/read - Clinics     Status: None (Preliminary result)   Result Value Ref Range    Systolic Blood Pressure  mmHg    Diastolic Blood Pressure  mmHg    Ventricular Rate 87 BPM    Atrial Rate 87 BPM    CT Interval 124 ms    QRS Duration 92 ms     ms    QTc 435 ms    P Axis 21 degrees    R AXIS 29 degrees    T Axis 34 degrees    Interpretation ECG       Sinus rhythm  Normal ECG  When compared with ECG of 01-MAR-2007 15:55,  PREVIOUS ECG IS PRESENT           A/P:    1. New DVT L LE and he is on Xarelto and he sees Kailash Montaño, Hematology 2/23/2024. Discussed would avoid impact activities/sports while on Xarelto.   2. ADHD on Methylphenidate  3. Reactive airway disease. He uses Flovent and Albuterol MDI's  4. Elevated ALT (90 on 1/31/2024). He has a h/o hepatic steatosis (last abdominal U/S 8/1/2020) and placed  hepatology referral 2/2/2024.   5. STD testing 1/31/2024 and 8/14/2023.   6. Dermatology; placed referral today for acne  7. R knee pain; Ordered X-ray and orthopedic referral  8. Pelvic pain; see imaging from 11/4/2022, 10/4/2022, and 9/8/2022. Placed Urology referral   9. One additional dose of Hepatitis B vaccine today.   10. EKG today for possible old h/o of long QTc.

## 2024-02-07 ENCOUNTER — ANCILLARY PROCEDURE (OUTPATIENT)
Dept: GENERAL RADIOLOGY | Facility: CLINIC | Age: 29
End: 2024-02-07
Attending: INTERNAL MEDICINE
Payer: COMMERCIAL

## 2024-02-07 ENCOUNTER — OFFICE VISIT (OUTPATIENT)
Dept: INTERNAL MEDICINE | Facility: CLINIC | Age: 29
End: 2024-02-07
Payer: COMMERCIAL

## 2024-02-07 ENCOUNTER — TELEPHONE (OUTPATIENT)
Dept: ORTHOPEDICS | Facility: CLINIC | Age: 29
End: 2024-02-07

## 2024-02-07 VITALS
OXYGEN SATURATION: 97 % | BODY MASS INDEX: 38.04 KG/M2 | SYSTOLIC BLOOD PRESSURE: 114 MMHG | WEIGHT: 250.2 LBS | DIASTOLIC BLOOD PRESSURE: 77 MMHG | HEART RATE: 85 BPM

## 2024-02-07 DIAGNOSIS — M25.511 CHRONIC RIGHT SHOULDER PAIN: ICD-10-CM

## 2024-02-07 DIAGNOSIS — R10.2 PELVIC PAIN IN MALE: ICD-10-CM

## 2024-02-07 DIAGNOSIS — G89.29 CHRONIC RIGHT SHOULDER PAIN: ICD-10-CM

## 2024-02-07 DIAGNOSIS — L70.9 ACNE, UNSPECIFIED ACNE TYPE: Primary | ICD-10-CM

## 2024-02-07 DIAGNOSIS — Z11.3 SCREEN FOR STD (SEXUALLY TRANSMITTED DISEASE): ICD-10-CM

## 2024-02-07 DIAGNOSIS — R05.9 COUGH, UNSPECIFIED TYPE: ICD-10-CM

## 2024-02-07 DIAGNOSIS — L70.9 ACNE, UNSPECIFIED ACNE TYPE: ICD-10-CM

## 2024-02-07 DIAGNOSIS — J45.909 ASTHMA: ICD-10-CM

## 2024-02-07 DIAGNOSIS — Z23 NEED FOR VACCINATION: ICD-10-CM

## 2024-02-07 LAB
ATRIAL RATE - MUSE: 87 BPM
DIASTOLIC BLOOD PRESSURE - MUSE: NORMAL MMHG
INTERPRETATION ECG - MUSE: NORMAL
P AXIS - MUSE: 21 DEGREES
PR INTERVAL - MUSE: 124 MS
QRS DURATION - MUSE: 92 MS
QT - MUSE: 362 MS
QTC - MUSE: 435 MS
R AXIS - MUSE: 29 DEGREES
SYSTOLIC BLOOD PRESSURE - MUSE: NORMAL MMHG
T AXIS - MUSE: 34 DEGREES
VENTRICULAR RATE- MUSE: 87 BPM

## 2024-02-07 PROCEDURE — 93000 ELECTROCARDIOGRAM COMPLETE: CPT | Performed by: INTERNAL MEDICINE

## 2024-02-07 PROCEDURE — 90746 HEPB VACCINE 3 DOSE ADULT IM: CPT | Performed by: INTERNAL MEDICINE

## 2024-02-07 PROCEDURE — 90471 IMMUNIZATION ADMIN: CPT | Performed by: INTERNAL MEDICINE

## 2024-02-07 PROCEDURE — 99395 PREV VISIT EST AGE 18-39: CPT | Mod: 25 | Performed by: INTERNAL MEDICINE

## 2024-02-07 PROCEDURE — 73562 X-RAY EXAM OF KNEE 3: CPT | Mod: LT | Performed by: RADIOLOGY

## 2024-02-07 RX ORDER — DEXAMETHASONE 4 MG/1
TABLET ORAL
Qty: 12 G | Refills: 1 | Status: SHIPPED | OUTPATIENT
Start: 2024-02-07 | End: 2024-06-10

## 2024-02-07 NOTE — TELEPHONE ENCOUNTER
Patient Contacted for the patient to call back and schedule the following:    Appointment type: New uro, new  Provider: Dr. Alesha Fontana  Return date: 3/19  Transferred to derm

## 2024-02-07 NOTE — PROGRESS NOTES
Lenard is a 28 year old that presents in clinic today for the following:     Chief Complaint   Patient presents with    Physical     1. Leg blood clot           2/7/2024     9:21 AM   Additional Questions   Roomed by YW   Accompanied by No     Screenings as of 1/31/24     PHQ-2 Total Score (Adult) - Positive if 3 or more points; Administer   PHQ-9 if positive 0    ACT TOTAL SCORE (Goal Greater than or Equal to 20) 24        JUAN Anne at 9:26 AM on 2/7/2024

## 2024-02-11 ENCOUNTER — MYC REFILL (OUTPATIENT)
Dept: INTERNAL MEDICINE | Facility: CLINIC | Age: 29
End: 2024-02-11
Payer: COMMERCIAL

## 2024-02-11 DIAGNOSIS — R05.9 COUGH, UNSPECIFIED TYPE: ICD-10-CM

## 2024-02-11 DIAGNOSIS — M25.511 CHRONIC RIGHT SHOULDER PAIN: ICD-10-CM

## 2024-02-11 DIAGNOSIS — G89.29 CHRONIC RIGHT SHOULDER PAIN: ICD-10-CM

## 2024-02-11 DIAGNOSIS — R10.2 PELVIC PAIN IN MALE: ICD-10-CM

## 2024-02-11 DIAGNOSIS — Z11.3 SCREEN FOR STD (SEXUALLY TRANSMITTED DISEASE): ICD-10-CM

## 2024-02-11 RX ORDER — DEXAMETHASONE 4 MG/1
TABLET ORAL
Qty: 12 G | Refills: 1 | Status: CANCELLED | OUTPATIENT
Start: 2024-02-11

## 2024-02-12 RX ORDER — FLUTICASONE PROPIONATE 110 UG/1
AEROSOL, METERED RESPIRATORY (INHALATION)
Qty: 1 G | Refills: 3 | Status: SHIPPED | OUTPATIENT
Start: 2024-02-12

## 2024-02-12 RX ORDER — DEXAMETHASONE 4 MG/1
TABLET ORAL
Refills: 1 | OUTPATIENT
Start: 2024-02-12

## 2024-02-13 NOTE — TELEPHONE ENCOUNTER
fluticasone (FLOVENT HFA) 110 MCG/ACT inhaler: addressed in other encounter  - refills sent 2/12/2024 (generic requested by pharmacy)

## 2024-02-14 ENCOUNTER — MYC MEDICAL ADVICE (OUTPATIENT)
Dept: INTERNAL MEDICINE | Facility: CLINIC | Age: 29
End: 2024-02-14
Payer: COMMERCIAL

## 2024-02-14 DIAGNOSIS — J45.909 ASTHMA: Primary | ICD-10-CM

## 2024-02-14 DIAGNOSIS — R05.9 COUGH, UNSPECIFIED TYPE: ICD-10-CM

## 2024-02-14 NOTE — PROGRESS NOTES
[facility]  [address] [place] [id number]  Main: [phone], Fax: [phone]    Patient seen at: [facility] at [address] [place] [address]    Outpatient Visit Note:    Patient: Miguel Angel Hernandez  MRN: [id number]  : 1995  SUZANNE: 2024  Location of this writer at the time of this clinic visit was conducted: [facility].  Location of the patient at the time of this clinic visit was conducted: [facility].     Reason for visit:  DVT of the distal left leg found on 2024.     HPI:  Lenard is a 28 year old male with a history of ADHD and asthma, who is found to have left distal lower extremity DVT back on 2024, referred by Dr. Layo Gage, primary care provider for consultation.     Back at the end of Dec 2023, he had an extensive travel itinerary to [place]. On 12/15/2023, he and his brother took a 16 hours flight from [place] to [place], then immediately after that, they took a 6 hours flight from [place] to the [facility]. Then on 2023, they took a 3-4 hours flight to [place], stayed there until 2023 where they took a 2 hours flight to [facility]. Then on 2023, they took a 3-4 hours flight from [facility] to [facility]. Finally on 2024, they took a 15-16 hours flight from [facility] back to [place].     Then dated back to 2024, he apparently started noticing some left calf pain after he was at the gym. 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 In fact, he used to be in the [place] snowboarding team. He no longer compete but now doing coaching work. He used to travel internationally during his years competing. 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 But by no means he was immobilized.     On 1/23/2024, he presented to the emergency department within the [facility] to evaluate his left leg pain and swelling. Left leg venous ultrasound was done showing acute appearing, nonocclusive thrombus in one of the two peroneal veins and a chronic appearing nonocclusive thrombus in the other visualized peroneal vein. With this report, he was started on rivaroxaban.     He then had a follow up with Dr. Gage on 1/31/2024 for which he was referred to see us for consultation.     Currently he is on rivaroxaban at 20 mg PO Qday dosing and endorses that he is taking it with food. He reports that his left calf pain has resolved. He does complaint of some mild headaches and fogginess lately. Denies any shortness of breath or chest pain. Denies any bleeding issues.     ROS:  Denies any bleeding complications. Specifically, no frequent epistaxis. No issues with oral mucosal bleeding. Denies any hematuria or blood in stools. Denies any shortness of breath. No chest pain. No cough. No fever.    Medications:  Current Outpatient Medications   Medication    albuterol (PROAIR HFA/PROVENTIL HFA/VENTOLIN HFA) 108 (90 Base) MCG/ACT inhaler    clindamycin (CLINDAMAX) 1 % external gel    FLOVENT  MCG/ACT inhaler    fluticasone (FLOVENT HFA) 110 MCG/ACT inhaler    guanFACINE (TENEX) 1 MG tablet    Methylphenidate HCl ER 36 MG 24H tablet    rivaroxaban ANTICOAGULANT (XARELTO) 20 MG TABS tablet    sildenafil (REVATIO) 20 MG tablet     No current facility-administered medications for this visit.     Allergies:  Allergies   Allergen Reactions    Seasonal Allergies      PmHx:  Past Medical History:   Diagnosis Date    ADHD     Asthma     Dyslipidemia     Obesity        Social History:   As above.     Family History:  His maternal grandmother apparently had a  "history of multiple \"blood clots\" in the past but unclear if they were venous or arterial thrombosis. Reportedly his maternal grandmother was wheelchair bound when these \"blood clots\" were found.   Lenard's parents have no history of venous thromboembolism.  He has 2 younger brothers with no history of venous thromboembolism.   No other family members that he is aware of with history of venous thromboembolism.     Objective:  Vitals: /83 (BP Location: Right arm, Patient Position: Right side, Cuff Size: Adult Large)   Pulse 89   Temp 97.6  F (36.4  C) (Tympanic)   Ht 1.753 m (5' 9\")   Wt 111.1 kg (244 lb 14.4 oz)   SpO2 99%   BMI 36.17 kg/m    Exam:   Complete exam is not performed today.    Labs:  Component      Latest Ref Rng 1/31/2024  11:13 AM   WBC      4.0 - 11.0 10e3/uL 7.2    RBC Count      4.40 - 5.90 10e6/uL 5.49    Hemoglobin      13.3 - 17.7 g/dL 16.1    Hematocrit      40.0 - 53.0 % 46.7    MCV      78 - 100 fL 85    MCH      26.5 - 33.0 pg 29.3    MCHC      31.5 - 36.5 g/dL 34.5    RDW      10.0 - 15.0 % 12.5    Platelet Count      150 - 450 10e3/uL 282    % Neutrophils      % 51    % Lymphocytes      % 36    % Monocytes      % 8    % Eosinophils      % 4    % Basophils      % 1    % Immature Granulocytes      % 0    NRBCs per 100 WBC      <1 /100 0    Absolute Neutrophils      1.6 - 8.3 10e3/uL 3.7    Absolute Lymphocytes      0.8 - 5.3 10e3/uL 2.6    Absolute Monocytes      0.0 - 1.3 10e3/uL 0.6    Absolute Eosinophils      0.0 - 0.7 10e3/uL 0.3    Absolute Basophils      0.0 - 0.2 10e3/uL 0.1    Absolute Immature Granulocytes      <=0.4 10e3/uL 0.0    Absolute NRBCs      10e3/uL 0.0    Sodium      135 - 145 mmol/L 143    Potassium      3.4 - 5.3 mmol/L 4.0    Carbon Dioxide (CO2)      22 - 29 mmol/L 27    Anion Gap      7 - 15 mmol/L 9    Urea Nitrogen      6.0 - 20.0 mg/dL 19.8    Creatinine      0.67 - 1.17 mg/dL 1.08    GFR Estimate      >60 mL/min/1.73m2 >90    Calcium      8.6 - 10.0 " "mg/dL 9.3    Chloride      98 - 107 mmol/L 107    Glucose      70 - 99 mg/dL 104 (H)    Alkaline Phosphatase      40 - 150 U/L 87    AST      0 - 45 U/L 43    ALT      0 - 70 U/L 90 (H)    Protein Total      6.4 - 8.3 g/dL 6.9    Albumin      3.5 - 5.2 g/dL 4.6    Bilirubin Total      <=1.2 mg/dL 0.4       Imaging:  Reviewed and are as described above.     Assessment:  In summary, Lenard is a 28 year old male with a history of ADHD and asthma, who is found to have left distal lower extremity DVT back on 1/24/2024, referred by Dr. Layo Gage, primary care provider for consultation.    Lenard's left distal lower extremity was a long distance travel provoked venous thromboembolic event. He had an extensive itinerary to [place] at from 12/15/2023 to 1/1/2023 and visited multiple  countries in a very short period of time.     He does report one family member with a history of \"blood clot\" while wheelchair bound. Thus it is highly unlikely that Lenard has any significant inherit thrombophilia.     Diagnosis:  Long distance travel provoked distal left lower extremity DVT.   Possible family history of venous thromboembolism but again details are unclear.     Plan:  I spent quite a bit of time today to educate Lenard on DVT/PE, provoked vs unprovoked venous thromboembolic events, and general approach in regard to anticoagulation therapy management and duration. I also answered a lot of Lenard's good questions to his satisfaction today.     I explain to Lenard that in accordance to current American Society of Hematology (ANISA) guidelines, for a venous thromboembolism that is caused by temporary provoking factors, 3-6 months of anticoagulation therapy should be suffice. In this particular case, considering that his DVT was clearly provoked and also considering that his DVT is confined to his distal leg (below the knee) and the fact that he basically now is asymptomatic from his DVT, I am recommending at total of 3 months of full " intensity uninterrupted anticoagulation therapy. He is doing well on rivaroxaban and thus I will keep him on rivaroxaban at 20 mg PO Qday for the remainder of his treatment.     Once his initial 3 months of anticoagulation therapy has been completed, I recommend that he is to stay on episodic pharmacological DVT/PE prophylaxis at times of increase venous thromboembolic risk, such as future long distance travelling of >4 hours. This can simply be achieved by having him take a dose of rivaroxaban at 10 mg PO Q 24 hours as needed for long distance travelling of >4 hours.     I am planning to have him get a repeat left leg venous ultrasound on or after 4/23/2024 and then have him return to see me 1-2 weeks after the repeat ultrasound. Again, I will likely discontinue his anticoagulation therapy at that time.     We briefly discuss about inherit thrombophilia today. I explain to Lenard that I do not feel that there are any indications to spent thousands of dollars to perform an inherit thrombophilia workup as I do feel that such workup will be low yield as his distal leg DVT was clearly provoked and that the result of such workup is not likely going to change our recommendation and treatment duration.    Plan Summary:  Continue rivaroxaban at 20 mg PO Qday with food for a total of 3 months.   Repeat left leg venous ultrasound on or after 4/23/2024.   Return to see me after the repeat venous ultrasound in April 2024.     He is given our clinic's contact information and is instructed to call if he should have any further questions or concerns.       Kailash Monatño PA-C, MPAS  Physician Assistant  [facility].     62 minutes spent by me on the date of the encounter doing chart review, history and exam, documentation and further activities per the note.    Time IN: 10:55  Time OUT: 11:40

## 2024-02-16 ENCOUNTER — MYC MEDICAL ADVICE (OUTPATIENT)
Dept: INTERNAL MEDICINE | Facility: CLINIC | Age: 29
End: 2024-02-16
Payer: COMMERCIAL

## 2024-02-16 DIAGNOSIS — F90.9 ADHD (ATTENTION DEFICIT HYPERACTIVITY DISORDER): Primary | ICD-10-CM

## 2024-02-19 DIAGNOSIS — R05.9 COUGH, UNSPECIFIED TYPE: ICD-10-CM

## 2024-02-19 DIAGNOSIS — J45.909 ASTHMA: ICD-10-CM

## 2024-02-22 NOTE — TELEPHONE ENCOUNTER
DIAGNOSIS: L knee pain, localized pain. Notices pain at gym, during lunges, balancing, per pt. Pain duration is 1-2 days then goes away.    APPOINTMENT DATE: 3/19/24   NOTES STATUS DETAILS   OFFICE NOTE from referring provider Self     OFFICE NOTE from other specialist Internal [facility]  2/7/24 Too VITALE MD   10/18/22 Raúl Mixon PT    DISCHARGE REPORT from the ER Care Everywhere  1/23/24 Paula CUEVAS - [facility]    MEDICATION LIST Internal    XRAYS (IMAGES & REPORTS) Internal 2/7/24 XR knee LT   2/2/24 US Lower Extremity

## 2024-02-23 ENCOUNTER — MYC MEDICAL ADVICE (OUTPATIENT)
Dept: HEMATOLOGY | Facility: CLINIC | Age: 29
End: 2024-02-23

## 2024-02-23 ENCOUNTER — OFFICE VISIT (OUTPATIENT)
Dept: HEMATOLOGY | Facility: CLINIC | Age: 29
End: 2024-02-23
Attending: INTERNAL MEDICINE
Payer: COMMERCIAL

## 2024-02-23 VITALS
SYSTOLIC BLOOD PRESSURE: 134 MMHG | HEIGHT: 69 IN | BODY MASS INDEX: 36.27 KG/M2 | WEIGHT: 244.9 LBS | DIASTOLIC BLOOD PRESSURE: 83 MMHG | TEMPERATURE: 97.6 F | HEART RATE: 89 BPM | OXYGEN SATURATION: 99 %

## 2024-02-23 DIAGNOSIS — R74.8 ELEVATED LIVER ENZYMES: ICD-10-CM

## 2024-02-23 DIAGNOSIS — I82.499 DEEP VEIN THROMBOSIS (DVT) OF OTHER VEIN OF LOWER EXTREMITY, UNSPECIFIED CHRONICITY, UNSPECIFIED LATERALITY (H): Primary | ICD-10-CM

## 2024-02-23 DIAGNOSIS — I82.499 DEEP VEIN THROMBOSIS (DVT) OF OTHER VEIN OF LOWER EXTREMITY, UNSPECIFIED CHRONICITY, UNSPECIFIED LATERALITY (H): ICD-10-CM

## 2024-02-23 PROCEDURE — G0463 HOSPITAL OUTPT CLINIC VISIT: HCPCS | Performed by: PHYSICIAN ASSISTANT

## 2024-02-23 PROCEDURE — 99205 OFFICE O/P NEW HI 60 MIN: CPT | Performed by: PHYSICIAN ASSISTANT

## 2024-02-23 NOTE — LETTER
[facility]  [address] [place] [id number]  Main: [phone], Fax: [phone]    Patient seen at: [facility] at [address] [place] [address]    Outpatient Visit Note:    Patient: Miguel Angel Hernandez  MRN: [id number]  : 1995  SUZANNE: 2024  Location of this writer at the time of this clinic visit was conducted: [facility].  Location of the patient at the time of this clinic visit was conducted: [facility].     Reason for visit:  DVT of the distal left leg found on 2024.     HPI:  Lenard is a 28 year old male with a history of ADHD and asthma, who is found to have left distal lower extremity DVT back on 2024, referred by Dr. Layo Gage, primary care provider for consultation.     Back at the end of Dec 2023, he had an [place]. On 12/15/2023, he and his brother took a 16 hours flight from [place] to [place], then immediately after that, they took a 6 hours flight from [place] to the [facility]. Then on 2023, they took a 3-4 hours flight to [place], stayed there until 2023 where they took a 2 hours flight to [facility]. Then on 2023, they took a 3-4 hours flight from [facility] to [facility]. Finally on 2024, they took a 15-16 hours flight from [facility] back to [place].     Then dated back to 2024, he apparently started noticing some left calf pain after he was at the gym. 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 In fact, he used to be in the [place] snowboarding team. He no longer compete but now doing coaching work. He used to travel internationally during his years competing. 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 But by no means he was immobilized.     On 1/23/2024, he presented to the emergency department within the [facility] to evaluate his left leg pain and swelling. Left leg venous ultrasound was done showing acute appearing, nonocclusive thrombus in one of the two peroneal veins and a chronic appearing nonocclusive thrombus in the other visualized peroneal vein. With this report, he was started on rivaroxaban.     He then had a follow up with Dr. Gage on 1/31/2024 for which he was referred to see us for consultation.     Currently he is on rivaroxaban at 20 mg PO Qday dosing and endorses that he is taking it with food. He reports that his left calf pain has resolved. He does complaint of some mild headaches and fogginess lately. Denies any shortness of breath or chest pain. Denies any bleeding issues.     ROS:  Denies any bleeding complications. Specifically, no frequent epistaxis. No issues with oral mucosal bleeding. Denies any hematuria or blood in stools. Denies any shortness of breath. No chest pain. No cough. No fever.    Medications:  Current Outpatient Medications   Medication     albuterol (PROAIR HFA/PROVENTIL HFA/VENTOLIN HFA) 108 (90 Base) MCG/ACT inhaler     clindamycin (CLINDAMAX) 1 % external gel     FLOVENT  MCG/ACT inhaler     fluticasone (FLOVENT HFA) 110 MCG/ACT inhaler     guanFACINE (TENEX) 1 MG tablet     Methylphenidate HCl ER 36 MG 24H tablet     rivaroxaban ANTICOAGULANT (XARELTO) 20 MG TABS tablet     sildenafil (REVATIO) 20 MG tablet     No current facility-administered medications for this visit.     Allergies:  Allergies   Allergen Reactions     Seasonal Allergies      PmHx:  Past Medical History:   Diagnosis Date     ADHD      Asthma      Dyslipidemia      Obesity        Social History:   As above.     Family History:  His maternal grandmother  "apparently had a history of multiple \"blood clots\" in the past but unclear if they were venous or arterial thrombosis. Reportedly his maternal grandmother was wheelchair bound when these \"blood clots\" were found.   Lenard's parents have no history of venous thromboembolism.  He has 2 younger brothers with no history of venous thromboembolism.   No other family members that he is aware of with history of venous thromboembolism.     Objective:  Vitals: /83 (BP Location: Right arm, Patient Position: Right side, Cuff Size: Adult Large)   Pulse 89   Temp 97.6  F (36.4  C) (Tympanic)   Ht 1.753 m (5' 9\")   Wt 111.1 kg (244 lb 14.4 oz)   SpO2 99%   BMI 36.17 kg/m    Exam:   Complete exam is not performed today.    Labs:  Component      Latest Ref Rng 1/31/2024  11:13 AM   WBC      4.0 - 11.0 10e3/uL 7.2    RBC Count      4.40 - 5.90 10e6/uL 5.49    Hemoglobin      13.3 - 17.7 g/dL 16.1    Hematocrit      40.0 - 53.0 % 46.7    MCV      78 - 100 fL 85    MCH      26.5 - 33.0 pg 29.3    MCHC      31.5 - 36.5 g/dL 34.5    RDW      10.0 - 15.0 % 12.5    Platelet Count      150 - 450 10e3/uL 282    % Neutrophils      % 51    % Lymphocytes      % 36    % Monocytes      % 8    % Eosinophils      % 4    % Basophils      % 1    % Immature Granulocytes      % 0    NRBCs per 100 WBC      <1 /100 0    Absolute Neutrophils      1.6 - 8.3 10e3/uL 3.7    Absolute Lymphocytes      0.8 - 5.3 10e3/uL 2.6    Absolute Monocytes      0.0 - 1.3 10e3/uL 0.6    Absolute Eosinophils      0.0 - 0.7 10e3/uL 0.3    Absolute Basophils      0.0 - 0.2 10e3/uL 0.1    Absolute Immature Granulocytes      <=0.4 10e3/uL 0.0    Absolute NRBCs      10e3/uL 0.0    Sodium      135 - 145 mmol/L 143    Potassium      3.4 - 5.3 mmol/L 4.0    Carbon Dioxide (CO2)      22 - 29 mmol/L 27    Anion Gap      7 - 15 mmol/L 9    Urea Nitrogen      6.0 - 20.0 mg/dL 19.8    Creatinine      0.67 - 1.17 mg/dL 1.08    GFR Estimate      >60 mL/min/1.73m2 >90  " "  Calcium      8.6 - 10.0 mg/dL 9.3    Chloride      98 - 107 mmol/L 107    Glucose      70 - 99 mg/dL 104 (H)    Alkaline Phosphatase      40 - 150 U/L 87    AST      0 - 45 U/L 43    ALT      0 - 70 U/L 90 (H)    Protein Total      6.4 - 8.3 g/dL 6.9    Albumin      3.5 - 5.2 g/dL 4.6    Bilirubin Total      <=1.2 mg/dL 0.4       Imaging:  Reviewed and are as described above.     Assessment:  In summary, Lenard is a 28 year old male with a history of ADHD and asthma, who is found to have left distal lower extremity DVT back on 1/24/2024, referred by Dr. Layo Gage, primary care provider for consultation.    Lenard's left distal lower extremity was a long distance travel provoked venous thromboembolic event. He had an extensive itinerary to [place] at from 12/15/2023 to 1/1/2023 and visited multiple  countries in a very short period of time.     He does report one family member with a history of \"blood clot\" while wheelchair bound. Thus it is highly unlikely that Lenard has any significant inherit thrombophilia.     Diagnosis:  Long distance travel provoked distal left lower extremity DVT.   Possible family history of venous thromboembolism but again details are unclear.     Plan:  I spent quite a bit of time today to educate Lenard on DVT/PE, provoked vs unprovoked venous thromboembolic events, and general approach in regard to anticoagulation therapy management and duration. I also answered a lot of Lenard's good questions to his satisfaction today.     I explain to Lenard that in accordance to current American Society of Hematology (ANISA) guidelines, for a venous thromboembolism that is caused by temporary provoking factors, 3-6 months of anticoagulation therapy should be suffice. In this particular case, considering that his DVT was clearly provoked and also considering that his DVT is confined to his distal leg (below the knee) and the fact that he basically now is asymptomatic from his DVT, I am recommending at " total of 3 months of full intensity uninterrupted anticoagulation therapy. He is doing well on rivaroxaban and thus I will keep him on rivaroxaban at 20 mg PO Qday for the remainder of his treatment.     Once his initial 3 months of anticoagulation therapy has been completed, I recommend that he is to stay on episodic pharmacological DVT/PE prophylaxis at times of increase venous thromboembolic risk, such as future long distance travelling of >4 hours. This can simply be achieved by having him take a dose of rivaroxaban at 10 mg PO Q 24 hours as needed for long distance travelling of >4 hours.     I am planning to have him get a repeat left leg venous ultrasound on or after 4/23/2024 and then have him return to see me 1-2 weeks after the repeat ultrasound. Again, I will likely discontinue his anticoagulation therapy at that time.     We briefly discuss about inherit thrombophilia today. I explain to Lenard that I do not feel that there are any indications to spent thousands of dollars to perform an inherit thrombophilia workup as I do feel that such workup will be low yield as his distal leg DVT was clearly provoked and that the result of such workup is not likely going to change our recommendation and treatment duration.    Plan Summary:  Continue rivaroxaban at 20 mg PO Qday with food for a total of 3 months.   Repeat left leg venous ultrasound on or after 4/23/2024.   Return to see me after the repeat venous ultrasound in April 2024.     He is given our clinic's contact information and is instructed to call if he should have any further questions or concerns.       Kailash Montaño PA-C, MPAS  Physician Assistant  [facility].     62 minutes spent by me on the date of the encounter doing chart review, history and exam, documentation and further activities per the note.    Time IN: 10:55  Time OUT: 11:40

## 2024-02-28 RX ORDER — MOMETASONE FUROATE 100 UG/1
AEROSOL RESPIRATORY (INHALATION)
Refills: 3 | OUTPATIENT
Start: 2024-02-28

## 2024-03-07 ENCOUNTER — PRE VISIT (OUTPATIENT)
Dept: UROLOGY | Facility: CLINIC | Age: 29
End: 2024-03-07
Payer: COMMERCIAL

## 2024-03-12 NOTE — TELEPHONE ENCOUNTER
MEDICAL RECORDS REQUEST   [facility]  Urology Clinic  [address] [place] [address]  PHONE: [phone]  Fax: [phone]        FUTURE VISIT INFORMATION                                                   Miguel Angel Hernandez, : 1995 scheduled for future visit at [facility]    APPOINTMENT INFORMATION:  Date: 2024  Provider:  Kacey Dahl CNP  Reason for Visit/Diagnosis: Pelvic pain    REFERRAL INFORMATION:  Referring provider:  Layo Gage MD in [facility]      RECORDS REQUESTED FOR VISIT                                                     NOTES  STATUS/DETAILS   OFFICE NOTE from referring provider  yes, 2024 -- Layo Gage MD in [facility]   MEDICATION LIST  yes   MR PELVIS  yes, 2022      PRE-VISIT CHECKLIST      Joint diagnostic appointment coordinated correctly          (ensure right order & amount of time) Yes   RECORD COLLECTION COMPLETE Yes
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
## 2024-03-18 NOTE — PROGRESS NOTES
"Sports Medicine Clinic           ASSESSMENT and PLAN:   Miguel Angel Hernandez is a 28 year old male present for one year of intermittent left knee pain, worse with activity. No evidence of arthritic changes on prior imaging. Physical exam largely reassuring, consistent with patellofemoral pain syndrome. Does have a history of quad tendon injury and feels like his left leg has always been weaker. Agreeable to physical therapy referral. If not significant improvement with PT plan for MRI of the left knee to evaluate for chondromalacia.   Patellofemoral pain syndrome of left knee  -     Physical Therapy  Referral; Future  -     follow up as needed      Return sooner if develops new or worsening symptoms.    Options for treatment and/or follow-up care were reviewed with the patient was actively involved in the decision making process. Patient verbalized understanding and was in agreement with the plan.    Patient was seen with Dr. Lorena Thibodeaux MD family medicine resident.     Carri Eduardo MD, [facility]  Primary Care Sports Medicine         SUBJECTIVE    Miguel Angel Hernandez is a 28 year old male presenting to clinic today with a chief complaint of left knee pain.    - left knee pain   - started about one year ago  - first noted when doing a single-legged exercise at the gym, followed by a mild \"twisting\" injury while snowboarding   - is not consistent pain, but pain will flare after a twisting injury and typically last about a week before improving   - not doing much for pain when there are flares, no ice or NSAIDs - also continues to go to the gym but tries to find alternative exercises   - snowboards 5-6 times per week and would continue through with this   - not currently in pain - has been cautious with snowboarding because he is on blood thinners   - does have a history of provoked blood clot back in January 2024 - has been taking his anticoagulation and plan for repeat US in April 2024   - no current swelling or " "tightness to area, now shortness of breath   - of note, does have some longstanding numbness across knee cap from snowboarding accident when he was earlier   - is also curious about both knees \"clicking\" for many years, noticed most when kneeling down   - does note a \"build up\" of stiffness in left knee, but this is eventually relieved with movement and an associated loud \"crack\"     Onset: 1 years(s) ago. One sided exercise at gym, immediately able to ambulate.   Location of Pain: left knee, medially  Rating of Pain at worst: worse with extension   Rating of Pain Currently: 0/10  Worsened by: flexion and extension, exercise   Better with: rest  Treatments tried: rest  Associated symptoms: numbness and locking or catching. no swelling, erythema, instability.   Orthopedic history: NO  Relevant surgical history: NO  Social history: social history: works as retired professional snowboarder.     PMH, Medications and Allergies were reviewed and updated as needed.    ROS:  As noted above otherwise negative.    There is no problem list on file for this patient.    Current Outpatient Medications   Medication Sig Dispense Refill    albuterol (PROAIR HFA/PROVENTIL HFA/VENTOLIN HFA) 108 (90 Base) MCG/ACT inhaler Inhale 1-2 puffs into the lungs every 6 hours as needed for shortness of breath / dyspnea or wheezing 18 g 3    clindamycin (CLINDAMAX) 1 % external gel Apply topically 2 times daily 100 mL 1    FLOVENT  MCG/ACT inhaler INHALE 2 PUFFS TWICE A DAY FOR TWO WEEKS AT ONSET OF URI. RINSE AFTER USE. 12 g 1    fluticasone (FLOVENT HFA) 110 MCG/ACT inhaler INHALE 2 PUFFS TWICE A DAY FOR TWO WEEKS AT ONSET OF URI. RINSE AFTER USE. 1 g 3    guanFACINE (TENEX) 1 MG tablet TAKE 1 TABLET BY MOUTH EVERY DAY IN THE MORNING      Methylphenidate HCl ER 36 MG 24H tablet Take 72 mg by mouth      mometasone furoate (ASMANEX HFA) 100 MCG/ACT inhaler Inhale 2 puffs into the lungs 2 times daily For 2 weeks at onset of URI 13 g 3    " [START ON 4/20/2024] rivaroxaban ANTICOAGULANT (XARELTO) 20 MG TABS tablet Take 1 tablet (20 mg) by mouth daily (with dinner) 30 tablet 1    rivaroxaban ANTICOAGULANT (XARELTO) 20 MG TABS tablet Take 1 tablet (20 mg) by mouth daily (with dinner) 90 tablet 0    sildenafil (REVATIO) 20 MG tablet Take 1-2 tablets (20-40 mg) by mouth daily as needed (take 1 hour before intercourse as needed.) May increase up to 100mg dose ( 5 tablets) if needed.  Use lowest effective dose. 30 tablet 3          OBJECTIVE:       Vitals: There were no vitals filed for this visit.  BMI: There is no height or weight on file to calculate BMI.    Physical Exam   Gen:  Well nourished and in no acute distress  HEENT: Extraocular movement intact  Pulm:  Breathing Comfortably. No increased respiratory effort.  Psych: Euthymic. Appropriately answers questions    MSK: no swelling, erythema or warmth. full active and passive range of motion, no pain with valgus and varus stress test, negative anterior and posterior drawer test, negative McMurrag test. Valgus moment with isolated left an right squat and relative glute weakness on the left compared to the right.     Imaging was personally reviewed and interpreted by me.   3 views left knee radiographs 2/7/2024 10:28 AM     History: Pelvic pain in male; Chronic right shoulder pain; Chronic  right shoulder pain; Screen for STD (sexually transmitted disease);  Cough, unspecified type; Acne, unspecified acne type     Comparison: None     Findings  AP, lateral and patellofemoral views of the left knee were obtained.   No acute osseous abnormality.  No significant joint effusion.  No substantial degenerative change.  No patellar tilt or lateral subluxation.  Soft tissue is unremarkable.                                                                Impression:  1. No acute osseous abnormality.  2. No substantial degenerative change

## 2024-03-19 ENCOUNTER — PRE VISIT (OUTPATIENT)
Dept: ORTHOPEDICS | Facility: CLINIC | Age: 29
End: 2024-03-19

## 2024-03-19 ENCOUNTER — PRE VISIT (OUTPATIENT)
Dept: UROLOGY | Facility: CLINIC | Age: 29
End: 2024-03-19

## 2024-03-19 ENCOUNTER — OFFICE VISIT (OUTPATIENT)
Dept: ORTHOPEDICS | Facility: CLINIC | Age: 29
End: 2024-03-19
Attending: INTERNAL MEDICINE
Payer: COMMERCIAL

## 2024-03-19 ENCOUNTER — OFFICE VISIT (OUTPATIENT)
Dept: UROLOGY | Facility: CLINIC | Age: 29
End: 2024-03-19
Attending: INTERNAL MEDICINE
Payer: COMMERCIAL

## 2024-03-19 ENCOUNTER — LAB (OUTPATIENT)
Dept: LAB | Facility: CLINIC | Age: 29
End: 2024-03-19
Payer: COMMERCIAL

## 2024-03-19 VITALS
WEIGHT: 244 LBS | HEIGHT: 69 IN | SYSTOLIC BLOOD PRESSURE: 136 MMHG | HEART RATE: 108 BPM | BODY MASS INDEX: 36.14 KG/M2 | DIASTOLIC BLOOD PRESSURE: 82 MMHG

## 2024-03-19 DIAGNOSIS — L70.9 ACNE, UNSPECIFIED ACNE TYPE: ICD-10-CM

## 2024-03-19 DIAGNOSIS — Z11.3 SCREEN FOR STD (SEXUALLY TRANSMITTED DISEASE): ICD-10-CM

## 2024-03-19 DIAGNOSIS — M22.2X2 PATELLOFEMORAL PAIN SYNDROME OF LEFT KNEE: Primary | ICD-10-CM

## 2024-03-19 DIAGNOSIS — G89.29 CHRONIC RIGHT SHOULDER PAIN: ICD-10-CM

## 2024-03-19 DIAGNOSIS — R10.2 PELVIC PAIN IN MALE: ICD-10-CM

## 2024-03-19 DIAGNOSIS — M25.511 CHRONIC RIGHT SHOULDER PAIN: ICD-10-CM

## 2024-03-19 DIAGNOSIS — R05.9 COUGH, UNSPECIFIED TYPE: ICD-10-CM

## 2024-03-19 LAB
PROLACTIN SERPL 3RD IS-MCNC: 4 NG/ML (ref 4–15)
SHBG SERPL-SCNC: 38 NMOL/L (ref 11–80)
TSH SERPL DL<=0.005 MIU/L-ACNC: 1.51 UIU/ML (ref 0.3–4.2)

## 2024-03-19 PROCEDURE — 99000 SPECIMEN HANDLING OFFICE-LAB: CPT | Performed by: PATHOLOGY

## 2024-03-19 PROCEDURE — 99203 OFFICE O/P NEW LOW 30 MIN: CPT | Performed by: NURSE PRACTITIONER

## 2024-03-19 PROCEDURE — 84443 ASSAY THYROID STIM HORMONE: CPT | Performed by: PATHOLOGY

## 2024-03-19 PROCEDURE — 84270 ASSAY OF SEX HORMONE GLOBUL: CPT | Performed by: NURSE PRACTITIONER

## 2024-03-19 PROCEDURE — 36415 COLL VENOUS BLD VENIPUNCTURE: CPT | Performed by: PATHOLOGY

## 2024-03-19 PROCEDURE — 99203 OFFICE O/P NEW LOW 30 MIN: CPT | Performed by: STUDENT IN AN ORGANIZED HEALTH CARE EDUCATION/TRAINING PROGRAM

## 2024-03-19 PROCEDURE — 84146 ASSAY OF PROLACTIN: CPT | Performed by: NURSE PRACTITIONER

## 2024-03-19 PROCEDURE — 84403 ASSAY OF TOTAL TESTOSTERONE: CPT | Performed by: NURSE PRACTITIONER

## 2024-03-19 SDOH — HEALTH STABILITY: PHYSICAL HEALTH: ON AVERAGE, HOW MANY DAYS PER WEEK DO YOU ENGAGE IN MODERATE TO STRENUOUS EXERCISE (LIKE A BRISK WALK)?: 4 DAYS

## 2024-03-19 SDOH — HEALTH STABILITY: PHYSICAL HEALTH: ON AVERAGE, HOW MANY MINUTES DO YOU ENGAGE IN EXERCISE AT THIS LEVEL?: 120 MIN

## 2024-03-19 ASSESSMENT — PAIN SCALES - GENERAL: PAINLEVEL: NO PAIN (0)

## 2024-03-19 NOTE — PATIENT INSTRUCTIONS
UROLOGY CLINIC VISIT PATIENT INSTRUCTIONS    -Will pursue a testicular ultrasound.   -Will also check some hormone labs.    If you have any issues, questions or concerns in the meantime, do not hesitate to contact us at [phone] or via [email or web address].     Kacey Dahl, CNP  Department of Urology
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
## 2024-03-19 NOTE — LETTER
"  3/19/2024      RE: Miguel Angel Hernandez  [address] [facility] [address]     Dear Colleague,    Thank you for referring your patient, Miguel Angel Hernandez, to the [facility] SPORTS MEDICINE CLINIC [place]. Please see a copy of my visit note below.    Sports Medicine Clinic           ASSESSMENT and PLAN:   Miguel Angel Hernandez is a 28 year old male present for one year of intermittent left knee pain, worse with activity. No evidence of arthritic changes on prior imaging. Physical exam largely reassuring, consistent with patellofemoral pain syndrome. Does have a history of quad tendon injury and feels like his left leg has always been weaker. Agreeable to physical therapy referral. If not significant improvement with PT plan for MRI of the left knee to evaluate for chondromalacia.   Patellofemoral pain syndrome of left knee  -     Physical Therapy  Referral; Future  -     follow up as needed      Return sooner if develops new or worsening symptoms.    Options for treatment and/or follow-up care were reviewed with the patient was actively involved in the decision making process. Patient verbalized understanding and was in agreement with the plan.    Patient was seen with Dr. Lorena Thibodeaux MD family medicine resident.     Carri Eduardo MD, [facility]  Primary Care Sports Medicine         SUBJECTIVE    Miguel Angel Hernandez is a 28 year old male presenting to clinic today with a chief complaint of left knee pain.    - left knee pain   - started about one year ago  - first noted when doing a single-legged exercise at the gym, followed by a mild \"twisting\" injury while snowboarding   - is not consistent pain, but pain will flare after a twisting injury and typically last about a week before improving   - not doing much for pain when there are flares, no ice or NSAIDs - also continues to go to the gym but tries to find alternative exercises   - snowboards 5-6 times per week and would continue through with this " "  - not currently in pain - has been cautious with snowboarding because he is on blood thinners   - does have a history of provoked blood clot back in January 2024 - has been taking his anticoagulation and plan for repeat US in April 2024   - no current swelling or tightness to area, now shortness of breath   - of note, does have some longstanding numbness across knee cap from snowboarding accident when he was earlier   - is also curious about both knees \"clicking\" for many years, noticed most when kneeling down   - does note a \"build up\" of stiffness in left knee, but this is eventually relieved with movement and an associated loud \"crack\"     Onset: 1 years(s) ago. One sided exercise at gym, immediately able to ambulate.   Location of Pain: left knee, medially  Rating of Pain at worst: worse with extension   Rating of Pain Currently: 0/10  Worsened by: flexion and extension, exercise   Better with: rest  Treatments tried: rest  Associated symptoms: numbness and locking or catching. no swelling, erythema, instability.   Orthopedic history: NO  Relevant surgical history: NO  Social history: social history: works as retired professional snowboarder.     PMH, Medications and Allergies were reviewed and updated as needed.    ROS:  As noted above otherwise negative.    There is no problem list on file for this patient.    Current Outpatient Medications   Medication Sig Dispense Refill     albuterol (PROAIR HFA/PROVENTIL HFA/VENTOLIN HFA) 108 (90 Base) MCG/ACT inhaler Inhale 1-2 puffs into the lungs every 6 hours as needed for shortness of breath / dyspnea or wheezing 18 g 3     clindamycin (CLINDAMAX) 1 % external gel Apply topically 2 times daily 100 mL 1     FLOVENT  MCG/ACT inhaler INHALE 2 PUFFS TWICE A DAY FOR TWO WEEKS AT ONSET OF URI. RINSE AFTER USE. 12 g 1     fluticasone (FLOVENT HFA) 110 MCG/ACT inhaler INHALE 2 PUFFS TWICE A DAY FOR TWO WEEKS AT ONSET OF URI. RINSE AFTER USE. 1 g 3     guanFACINE " (TENEX) 1 MG tablet TAKE 1 TABLET BY MOUTH EVERY DAY IN THE MORNING       Methylphenidate HCl ER 36 MG 24H tablet Take 72 mg by mouth       mometasone furoate (ASMANEX HFA) 100 MCG/ACT inhaler Inhale 2 puffs into the lungs 2 times daily For 2 weeks at onset of URI 13 g 3     [START ON 4/20/2024] rivaroxaban ANTICOAGULANT (XARELTO) 20 MG TABS tablet Take 1 tablet (20 mg) by mouth daily (with dinner) 30 tablet 1     rivaroxaban ANTICOAGULANT (XARELTO) 20 MG TABS tablet Take 1 tablet (20 mg) by mouth daily (with dinner) 90 tablet 0     sildenafil (REVATIO) 20 MG tablet Take 1-2 tablets (20-40 mg) by mouth daily as needed (take 1 hour before intercourse as needed.) May increase up to 100mg dose ( 5 tablets) if needed.  Use lowest effective dose. 30 tablet 3          OBJECTIVE:       Vitals: There were no vitals filed for this visit.  BMI: There is no height or weight on file to calculate BMI.    Physical Exam   Gen:  Well nourished and in no acute distress  HEENT: Extraocular movement intact  Pulm:  Breathing Comfortably. No increased respiratory effort.  Psych: Euthymic. Appropriately answers questions    MSK: no swelling, erythema or warmth. full active and passive range of motion, no pain with valgus and varus stress test, negative anterior and posterior drawer test, negative McMurrag test. Valgus moment with isolated left an right squat and relative glute weakness on the left compared to the right.     Imaging was personally reviewed and interpreted by me.   3 views left knee radiographs 2/7/2024 10:28 AM     History: Pelvic pain in male; Chronic right shoulder pain; Chronic  right shoulder pain; Screen for STD (sexually transmitted disease);  Cough, unspecified type; Acne, unspecified acne type     Comparison: None     Findings  AP, lateral and patellofemoral views of the left knee were obtained.   No acute osseous abnormality.  No significant joint effusion.  No substantial degenerative change.  No patellar tilt  or lateral subluxation.  Soft tissue is unremarkable.                                                                Impression:  1. No acute osseous abnormality.  2. No substantial degenerative change        Again, thank you for allowing me to participate in the care of your patient.      Sincerely,    Carri Eduardo MD

## 2024-03-19 NOTE — PATIENT INSTRUCTIONS
[facility] Outpatient Physical Therapy Locations    To schedule an appointment please call our scheduling department at [phone]    To fax a referral to be scheduled fax to [phone]    [place]: [address], [facility]: [address] [facility]: [address], [facility]: [address], [facility]: [address], [facility]: [address] [facility]: [address] [place], [place]  [place]: [address] [facility] , [address], [facility]: [address] [facility] , [address], [facility]: [address] [facility]: [address], [facility]: [address], [facility]: [address], [facility]: [address], [facility]: [address] [facility]: [address], [facility]: [facility]: [address], [address], [facility]: [address], [facility]: [address], [place], [place]  [place]: [address] [facility]: [address] [place] [address]: [address], [facility]: [address] [facility] [place], [place]  [place]: [address], [facility]: [address], [facility]: [address] [facility] [place], [place]  [facility]: [address] [place], [place]  [facility]: [address] [place] [address]  [place]: [address] [facility], [address], [facility] [address] [facility],  [facility]: [address] [place], [place]

## 2024-03-20 ENCOUNTER — ANCILLARY PROCEDURE (OUTPATIENT)
Dept: ULTRASOUND IMAGING | Facility: CLINIC | Age: 29
End: 2024-03-20
Attending: NURSE PRACTITIONER
Payer: COMMERCIAL

## 2024-03-20 DIAGNOSIS — R10.2 PELVIC PAIN IN MALE: ICD-10-CM

## 2024-03-20 PROCEDURE — 93976 VASCULAR STUDY: CPT | Mod: GC | Performed by: STUDENT IN AN ORGANIZED HEALTH CARE EDUCATION/TRAINING PROGRAM

## 2024-03-20 PROCEDURE — 76870 US EXAM SCROTUM: CPT | Mod: GC | Performed by: STUDENT IN AN ORGANIZED HEALTH CARE EDUCATION/TRAINING PROGRAM

## 2024-03-21 LAB
TESTOST FREE SERPL-MCNC: 9.83 NG/DL
TESTOST SERPL-MCNC: 509 NG/DL (ref 240–950)

## 2024-03-27 ENCOUNTER — LAB (OUTPATIENT)
Dept: LAB | Facility: CLINIC | Age: 29
End: 2024-03-27
Payer: COMMERCIAL

## 2024-03-27 ENCOUNTER — OFFICE VISIT (OUTPATIENT)
Dept: INTERNAL MEDICINE | Facility: CLINIC | Age: 29
End: 2024-03-27
Payer: COMMERCIAL

## 2024-03-27 VITALS
BODY MASS INDEX: 36.83 KG/M2 | HEART RATE: 90 BPM | DIASTOLIC BLOOD PRESSURE: 86 MMHG | SYSTOLIC BLOOD PRESSURE: 136 MMHG | WEIGHT: 249.4 LBS | OXYGEN SATURATION: 97 %

## 2024-03-27 DIAGNOSIS — F90.9 ATTENTION DEFICIT HYPERACTIVITY DISORDER (ADHD), UNSPECIFIED ADHD TYPE: ICD-10-CM

## 2024-03-27 DIAGNOSIS — R42 DIZZINESS: ICD-10-CM

## 2024-03-27 DIAGNOSIS — F90.9 ATTENTION DEFICIT HYPERACTIVITY DISORDER (ADHD), UNSPECIFIED ADHD TYPE: Primary | ICD-10-CM

## 2024-03-27 DIAGNOSIS — I86.1 VARICOCELE: ICD-10-CM

## 2024-03-27 LAB — CREAT UR-MCNC: 181 MG/DL

## 2024-03-27 PROCEDURE — 99214 OFFICE O/P EST MOD 30 MIN: CPT

## 2024-03-27 PROCEDURE — 99000 SPECIMEN HANDLING OFFICE-LAB: CPT | Performed by: PATHOLOGY

## 2024-03-27 PROCEDURE — 80346 BENZODIAZEPINES1-12: CPT

## 2024-03-27 PROCEDURE — 80365 DRUG SCREENING OXYCODONE: CPT

## 2024-03-27 RX ORDER — METHYLPHENIDATE HYDROCHLORIDE 36 MG/1
72 TABLET, EXTENDED RELEASE ORAL EVERY MORNING
Qty: 60 TABLET | Refills: 0 | Status: CANCELLED | OUTPATIENT
Start: 2024-03-29

## 2024-03-27 RX ORDER — METHYLPHENIDATE HYDROCHLORIDE 36 MG/1
72 TABLET ORAL EVERY MORNING
Qty: 60 TABLET | Refills: 0 | Status: SHIPPED | OUTPATIENT
Start: 2024-03-29 | End: 2024-05-13

## 2024-03-27 NOTE — PROGRESS NOTES
Assessment & Plan     Attention deficit hyperactivity disorder (ADHD), unspecified ADHD type  PDMP site reviewed today. Methylphenidate ER 36 mg tablet filled 3/06/24 for #60, a 30-day supply. Next refill would be due 4/5. He will be going out of town this Friday, [place] of [place] 3 [place]. OK to fill medication early if approved by insurance. Will check UDS today.   - methylphenidate HCl ER, OSM, (CONCERTA) 36 MG CR tablet  Dispense: 60 tablet; Refill: 0  - Drug Confirmation Panel Urine with Creat - lab collect    Varicocele  Discussed varicoceles, reviewed Kacey Dahl's notes together. Discussed he can reach out to urology for further questions, can schedule with Dr. De Oliveira if he would like to discuss management further.      Dizziness  Intermittent dizziness, lasts less than a minute, 3 episodes over two months. Rare. Exam normal today, he is on rivaroxaban. Discussed hydrating well, adequate rest, monitoring symptoms, stress management. He will be traveling to [place] later this week, will be at higher elevation. Plans to fly into [place], stay for one day to acclimate, then will proceed to higher elevations. He notes he has tolerated altitudes previously without issue. Recommend further evaluation for any new or worsening symptoms.      Return in about 6 weeks (around 5/9/2024) for with PCP as scheduled.    Kayla Weinberg is a 28 year old, presenting for the following health issues:  RECHECK (Medication review/refill./Blood clots Dx. Lower leg and groin.)        3/27/2024     9:14 AM   Additional Questions   Roomed by KTR     History of Present Illness       Reason for visit:  Medication management    He eats 0-1 servings of fruits and vegetables daily.He consumes 0 sweetened beverage(s) daily.He exercises with enough effort to increase his heart rate 10 to 19 minutes per day.  He exercises with enough effort to increase his heart rate 4 days per week.   He is taking medications regularly.     Lenard  presents to the clinic today for follow-up.    He has ADHD, diagnosed in childhood (approximately 20 years ago) per his report. He was seeing a psychiatry provider for many years, though they no longer have availability. He has since been following with different providers at [facility], has been unable to see the same provider, notes he keeps being scheduled with different providers. He also notes his insurance no longer covers [facility]. He has been taking methylphenidate 72 mg daily along with guanfacine 1 mg daily. He notes this works well for him, has been taking the same doses, stable. He would like to know if he needs to see a new psychiatrist or if ADHD can be managed in primary care. He will be leaving Friday afternoon for [place] and [place], will be gone for 3 weeks. He would like a refill of medication prior to leaving if possible.     He has questions about the varicocele found on recent testicular ultrasound. He notes testicular pain for the past few years. Would like to know if anything will help this. Also concerned about thrombus found on imaging.    He notes intermittent dizziness on occasion, spinning sensation. Happened approximately 3 times in the past two months. Lasts a minute at most. Unclear of trigger. No weakness, chest pain, or noticed palpitations. No recent illness, no hearing changes, no tinnitus. He will have dyspnea on occasion, thinks this is related to anxiety. Resolves spontaneously.       Review of Systems  Constitutional, HEENT, cardiovascular, pulmonary, gi and gu systems are negative, except as otherwise noted.      Objective    /86 (BP Location: Right arm, Patient Position: Sitting, Cuff Size: Adult Large)   Pulse 90   Wt 113.1 kg (249 lb 6.4 oz)   SpO2 97%   BMI 36.83 kg/m    Body mass index is 36.83 kg/m .  Physical Exam   GENERAL: alert and no distress  EYES: Eyes grossly normal to inspection, PERRL and conjunctivae and sclerae normal  HENT: ear canals  and TM's normal, nose and mouth without ulcers or lesions  NECK: no adenopathy, no asymmetry, masses, or scars  RESP: lungs clear to auscultation - no rales, rhonchi or wheezes  CV: regular rate and rhythm, normal S1 S2, no S3 or S4, no murmur, click or rub  NEURO: CN III-XII intact; normal strength and tone, mentation intact and speech normal  PSYCH: mentation appears normal, affect normal/bright          Signed Electronically by: Vidhya Law NP

## 2024-03-29 LAB
ME-PHENIDATE UR CFM-MCNC: 1380 NG/ML
ME-PHENIDATE UR CFM-MCNC: ABNORMAL NG/ML
ME-PHENIDATE/CREAT UR: 762 NG/MG {CREAT}
ME-PHENIDATE/CREAT UR: ABNORMAL

## 2024-04-11 ENCOUNTER — VIRTUAL VISIT (OUTPATIENT)
Dept: UROLOGY | Facility: CLINIC | Age: 29
End: 2024-04-11
Payer: COMMERCIAL

## 2024-04-11 VITALS — HEIGHT: 70 IN | WEIGHT: 225 LBS | BODY MASS INDEX: 32.21 KG/M2

## 2024-04-11 DIAGNOSIS — I86.1 BILATERAL VARICOCELES: ICD-10-CM

## 2024-04-11 DIAGNOSIS — R10.2 PELVIC PAIN IN MALE: ICD-10-CM

## 2024-04-11 DIAGNOSIS — N50.812 PAIN IN LEFT TESTICLE: Primary | ICD-10-CM

## 2024-04-11 PROCEDURE — 99214 OFFICE O/P EST MOD 30 MIN: CPT | Mod: 95 | Performed by: UROLOGY

## 2024-04-11 ASSESSMENT — PAIN SCALES - GENERAL: PAINLEVEL: MILD PAIN (2)
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
## 2024-04-11 NOTE — NURSING NOTE
Is the patient currently in the state of [place]? YES    Visit mode:VIDEO    If the visit is dropped, the patient can be reconnected by: VIDEO VISIT: Text to cell phone:   Telephone Information:   Mobile [phone]       Will anyone else be joining the visit? NO  (If patient encounters technical issues they should call [phone]    How would you like to obtain your AVS? MyChart    Are changes needed to the allergy or medication list? No      Reason for visit: RECHECK    Bobbilynn Grossaint VVF
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
## 2024-04-12 NOTE — PROGRESS NOTES
[facility]  [facility]  [address], [address], [place] [address]  Main: [phone], Fax: [phone]    Video Virtual Visit Note:    Patient: Miguel Angel Hernandez  MRN: [id number]  : 1995  SUZANNE: 2024  Location of the patient when this video visit is conducted: Patient's home  Location of this writer at the time of this video visit is conducted: [facility], [facility].     Due to the ongoing COVID-19 outbreak, this visit was conducted by video, with the patient's approval.    Reason of today's visit:  DVT of the distal left leg found on 2024.      Clinical History Summary:  Lenard is a 28 year old male with a history of ADHD and asthma, who was found to have left distal lower extremity DVT back on 2024, currently on rivaroxaban at 20 mg PO Qday, returns to clinic for his follow up visit. He was last seen by this writer on 2024.      Thrombosis History Summary:  End of Dec 2023, [place] had an [place]    12/15/2023, he and his brother took a 16 hours flight from [place] to [place], then immediately after that, they took a 6 hours flight from [place] to the [facility].   2023, they took a 3-4 hours flight to [place], stayed there until 2023, they took a 2 hours flight to [facility].   2023, they took a 3-4 hours flight from [facility] to [facility].   2024, they took a 15-16 hours flight from [facility] back to [place].   2024, he apparently started noticing some left calf pain after he was at the gym. 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 In fact, he used to be in the [place] snowboarding team. He no longer compete but now doing coaching work. He used to travel internationally during his years competing. 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 But by no means he was immobilized.   1/23/2024, he presented to the emergency department within the [facility] to evaluate his left leg pain and swelling. Left leg venous ultrasound was done showing acute appearing, nonocclusive thrombus in one of the two peroneal veins and a chronic appearing nonocclusive thrombus in the other visualized peroneal vein. With this report, he was started on rivaroxaban.   1/31/2024, he had a follow up with Dr. Gage and was referred to see us for consultation.        Interim History:  2/23/2024, he established care with this writer and I determined that his left leg DVT was a provoked event. I planned to have him complete anticoagulation therapy for 3 months and then repeat left leg venous ultrasound.   4/23/2024, repeat left leg venous ultrasound showed: No DVT in the left lower extremity.     Lenard reports that he has been doing well with rivaroxaban without any bleeding issues. He denies any lower extremity swelling or pain. Today, Lenard informed this writer that he was evaluated back in March 2024 for ongoing scrotal pain. Testicular Ultrasound at the time showed bilateral varicoceles with left greater than right for which the left measuring up to 0.6 cm and containing intraluminal thrombus.     ROS:  Denies any bleeding complications. Specifically, no frequent epistaxis. No issues with oral mucosal bleeding. Denies any hematuria or blood in stools. Denies any shortness of breath. No chest pain. No cough. No fever.    Medications:   Current Outpatient Medications   Medication Sig Dispense Refill    albuterol (PROAIR HFA/PROVENTIL HFA/VENTOLIN HFA) 108 (90 Base) MCG/ACT inhaler Inhale 1-2 puffs into the lungs every 6 hours as needed for shortness of breath / dyspnea or wheezing 18 g 3    clindamycin  (CLINDAMAX) 1 % external gel Apply topically 2 times daily 100 mL 1    FLOVENT  MCG/ACT inhaler INHALE 2 PUFFS TWICE A DAY FOR TWO WEEKS AT ONSET OF URI. RINSE AFTER USE. 12 g 1    fluticasone (FLOVENT HFA) 110 MCG/ACT inhaler INHALE 2 PUFFS TWICE A DAY FOR TWO WEEKS AT ONSET OF URI. RINSE AFTER USE. 1 g 3    guanFACINE (TENEX) 1 MG tablet TAKE 1 TABLET BY MOUTH EVERY DAY IN THE MORNING      Methylphenidate HCl ER 36 MG 24H tablet Take 72 mg by mouth      methylphenidate HCl ER, OSM, (CONCERTA) 36 MG CR tablet Take 2 tablets (72 mg) by mouth every morning 60 tablet 0    mometasone furoate (ASMANEX HFA) 100 MCG/ACT inhaler Inhale 2 puffs into the lungs 2 times daily For 2 weeks at onset of URI 13 g 3    [START ON 4/20/2024] rivaroxaban ANTICOAGULANT (XARELTO) 20 MG TABS tablet Take 1 tablet (20 mg) by mouth daily (with dinner) 30 tablet 1    rivaroxaban ANTICOAGULANT (XARELTO) 20 MG TABS tablet Take 1 tablet (20 mg) by mouth daily (with dinner) 90 tablet 0    sildenafil (REVATIO) 20 MG tablet Take 1-2 tablets (20-40 mg) by mouth daily as needed (take 1 hour before intercourse as needed.) May increase up to 100mg dose ( 5 tablets) if needed.  Use lowest effective dose. 30 tablet 3     No current facility-administered medications for this visit.       Allergies:   Allergies   Allergen Reactions    Seasonal Allergies         PMH:   Past Medical History:   Diagnosis Date    ADHD     Asthma     Dyslipidemia     Obesity        Social History:   Deferred    Family History:  Deferred    Objective:  Visual Examination via Video:  Pleasant in no acute distress.  Normal work of breathing   A+O x 3    Imaging:  Reviewed and are as described above.     Assessment:  In summary, Lenard is a 28 year old male with a history of ADHD and asthma, who is found to have left distal lower extremity DVT back on 1/24/2024, referred by Dr. Layo Gage, primary care provider for consultation.     Lenard's left distal lower extremity was a  "long distance travel provoked venous thromboembolic event. He had an extensive itinerary to [place] at from 12/15/2023 to 1/1/2023 and visited multiple  countries in a very short period of time.      He does report one family member with a history of \"blood clot\" while wheelchair bound. Thus it is highly unlikely that Lenard has any significant inherit thrombophilia.      Diagnosis:  Long distance travel provoked distal left lower extremity DVT.   Possible family history of venous thromboembolism but again details are unclear.   Bilateral varicoceles found on testicular ultrasound on 3/20/2024 with intraluminal thrombus.     Plan:  He has completed 3 months of anticoagulation therapy for a clearly provoked venous thromboembolic event. Thus there are no further indications for him to stay on indefinite anticoagulation therapy.     I did explain to him that his varicocele is the cause of his intraluminal thrombus in the scrotal vein. In general, this do not require anticoagulation therapy.     I do recommend that this patient should receive aggressive mechanical DVT/PE and/or pharmacological DVT/PE prophylaxis in the future if she should be in situation for increase risk of venous thromboembolism. These situations include but not limited to: 1) Prolong immobility or hospitalization of >24 hours; 2) Surgery, especially orthopedic type surgery; 3) Traumatic injury etc....     Thus for future long distance car or air travels of >4 hours, he is instructed to take a dose of rivaroxaban just prior to his trip. Since he has about 25 tablets of rivaroxaban at 20 mg left, he is instructed to use these for his future trips. Then after that, he can decrease the dose of rivaroxaban to 10 mg PO Q 24 hours as needed for long distance car or air travels of >4 hours.    I explain to Lenard that if Dr. Gage is comfortable of prescribing his rivaroxaban prophylactic tablets in the future, then he does not necessarily need to see me " back for follow up. However, if he would like me to continue to prescribe his rivaroxaban in the future, then I will see him back annually to refill his prescription.     Lenard has a few other questions today for which I have answered them all to his satisfaction.       Video-Visit Details:  Type of service:  Video Visit  Video Start Time:  14:30  Video End Time (time video stopped): 14:50  Originating Location (pt. Location): Home  Distant Location (provider location):  [facility]   Mode of Communication:  Video Conference via [facility]      Kailash Montaño PA-C, MPAS  Physician Assistant  [facility].     30 minutes spent by me on the date of the encounter doing chart review, history and exam, documentation and further activities per the note

## 2024-04-23 ENCOUNTER — ANCILLARY PROCEDURE (OUTPATIENT)
Dept: ULTRASOUND IMAGING | Facility: CLINIC | Age: 29
End: 2024-04-23
Attending: PHYSICIAN ASSISTANT
Payer: COMMERCIAL

## 2024-04-23 DIAGNOSIS — I82.499 DEEP VEIN THROMBOSIS (DVT) OF OTHER VEIN OF LOWER EXTREMITY, UNSPECIFIED CHRONICITY, UNSPECIFIED LATERALITY (H): ICD-10-CM

## 2024-04-23 PROCEDURE — 93971 EXTREMITY STUDY: CPT | Mod: LT | Performed by: RADIOLOGY

## 2024-04-24 ENCOUNTER — TELEPHONE (OUTPATIENT)
Dept: INTERNAL MEDICINE | Facility: CLINIC | Age: 29
End: 2024-04-24
Payer: COMMERCIAL

## 2024-04-24 NOTE — TELEPHONE ENCOUNTER
[facility] Call Center    Phone Message    May a detailed message be left on voicemail: yes     Reason for Call: Other: Patient has varicole and it is causing lots of discomfort and he would like to get things to move a lot sooner . Please reach out to patient to see if he can be seen sooner than May 9th.    Action Taken: Message routed to:  [facility] ([facility]    Travel Screening: Not Applicable

## 2024-04-29 ENCOUNTER — VIRTUAL VISIT (OUTPATIENT)
Dept: HEMATOLOGY | Facility: CLINIC | Age: 29
End: 2024-04-29
Attending: PHYSICIAN ASSISTANT
Payer: COMMERCIAL

## 2024-04-29 DIAGNOSIS — Z86.718 PERSONAL HISTORY OF DVT (DEEP VEIN THROMBOSIS): Primary | ICD-10-CM

## 2024-04-29 PROCEDURE — 99214 OFFICE O/P EST MOD 30 MIN: CPT | Mod: VID | Performed by: PHYSICIAN ASSISTANT

## 2024-04-29 NOTE — LETTER
[facility]  [facility]  [address], [address], [place] [address]  Main: [phone], Fax: [phone]    Video Virtual Visit Note:    Patient: Miguel Angel Hernandez  MRN: [id number]  : 1995  SUZANNE: 2024  Location of the patient when this video visit is conducted: Patient's home  Location of this writer at the time of this video visit is conducted: [facility].     Due to the ongoing COVID-19 outbreak, this visit was conducted by video, with the patient's approval.    Reason of today's visit:  DVT of the distal left leg found on 2024.      Clinical History Summary:  Lenard is a 28 year old male with a history of ADHD and asthma, who was found to have left distal lower extremity DVT back on 2024, currently on rivaroxaban at 20 mg PO Qday, returns to clinic for his follow up visit. He was last seen by this writer on 2024.      Thrombosis History Summary:  End of Dec 2023, he had an [place]    12[place]15/2023, he and his brother took a 16 hours flight from [place] to [place], then immediately after that, they took a 6 hours flight from [place] to [facility].   2023, they took a 3-4 hours flight to [place], stayed there until 2023, they took a 2 hours flight to [facility].   2023, they took a 3-4 hours flight from [facility] to [facility].   2024, they took a 15-16 hours flight from [facility] back to [place].   2024, he apparently started noticing some left calf pain after he was at the gym. 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 In fact, he used to be in the [place] snowboarding team. He no longer compete but now doing coaching work. He used to travel internationally during his years competing.  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 But by no means he was immobilized.   1/23/2024, he presented to the emergency department within the [facility] to evaluate his left leg pain and swelling. Left leg venous ultrasound was done showing acute appearing, nonocclusive thrombus in one of the two peroneal veins and a chronic appearing nonocclusive thrombus in the other visualized peroneal vein. With this report, he was started on rivaroxaban.   1/31/2024, he had a follow up with Dr. Gage and was referred to see us for consultation.        Interim History:  2/23/2024, he established care with this writer and I determined that his left leg DVT was a provoked event. I planned to have him complete anticoagulation therapy for 3 months and then repeat left leg venous ultrasound.   4/23/2024, repeat left leg venous ultrasound showed: No DVT in the left lower extremity.     Lenard reports that he has been doing well with rivaroxaban without any bleeding issues. He denies any lower extremity swelling or pain. Today, Lenard informed this writer that he was evaluated back in March 2024 for ongoing scrotal pain. Testicular Ultrasound at the time showed bilateral varicoceles with left greater than right for which the left measuring up to 0.6 cm and containing intraluminal thrombus.     ROS:  Denies any bleeding complications. Specifically, no frequent epistaxis. No issues with oral mucosal bleeding. Denies any hematuria or blood in stools. Denies any shortness of breath. No chest pain. No cough. No fever.    Medications:   Current Outpatient Medications   Medication Sig Dispense Refill     albuterol (PROAIR HFA/PROVENTIL HFA/VENTOLIN HFA) 108 (90 Base) MCG/ACT inhaler Inhale 1-2 puffs into the lungs every 6 hours as needed for shortness of breath / dyspnea or wheezing 18 g 3     clindamycin  (CLINDAMAX) 1 % external gel Apply topically 2 times daily 100 mL 1     FLOVENT  MCG/ACT inhaler INHALE 2 PUFFS TWICE A DAY FOR TWO WEEKS AT ONSET OF URI. RINSE AFTER USE. 12 g 1     fluticasone (FLOVENT HFA) 110 MCG/ACT inhaler INHALE 2 PUFFS TWICE A DAY FOR TWO WEEKS AT ONSET OF URI. RINSE AFTER USE. 1 g 3     guanFACINE (TENEX) 1 MG tablet TAKE 1 TABLET BY MOUTH EVERY DAY IN THE MORNING       Methylphenidate HCl ER 36 MG 24H tablet Take 72 mg by mouth       methylphenidate HCl ER, OSM, (CONCERTA) 36 MG CR tablet Take 2 tablets (72 mg) by mouth every morning 60 tablet 0     mometasone furoate (ASMANEX HFA) 100 MCG/ACT inhaler Inhale 2 puffs into the lungs 2 times daily For 2 weeks at onset of URI 13 g 3     [START ON 4/20/2024] rivaroxaban ANTICOAGULANT (XARELTO) 20 MG TABS tablet Take 1 tablet (20 mg) by mouth daily (with dinner) 30 tablet 1     rivaroxaban ANTICOAGULANT (XARELTO) 20 MG TABS tablet Take 1 tablet (20 mg) by mouth daily (with dinner) 90 tablet 0     sildenafil (REVATIO) 20 MG tablet Take 1-2 tablets (20-40 mg) by mouth daily as needed (take 1 hour before intercourse as needed.) May increase up to 100mg dose ( 5 tablets) if needed.  Use lowest effective dose. 30 tablet 3     No current facility-administered medications for this visit.       Allergies:   Allergies   Allergen Reactions     Seasonal Allergies         PMH:   Past Medical History:   Diagnosis Date     ADHD      Asthma      Dyslipidemia      Obesity        Social History:   Deferred    Family History:  Deferred    Objective:  Visual Examination via Video:  Pleasant in no acute distress.  Normal work of breathing   A+O x 3    Imaging:  Reviewed and are as described above.     Assessment:  In summary, Lenard is a 28 year old male with a history of ADHD and asthma, who is found to have left distal lower extremity DVT back on 1/24/2024, referred by Dr. Layo Gage, primary care provider for consultation.     Lenard's left distal lower  "extremity was a long distance travel provoked venous thromboembolic event. He had an extensive itinerary to [place] at from 12/15/2023 to 1/1/2023 and visited multiple  countries in a very short period of time.      He does report one family member with a history of \"blood clot\" while wheelchair bound. Thus it is highly unlikely that Lenard has any significant inherit thrombophilia.      Diagnosis:  Long distance travel provoked distal left lower extremity DVT.   Possible family history of venous thromboembolism but again details are unclear.   Bilateral varicoceles found on testicular ultrasound on 3/20/2024 with intraluminal thrombus.     Plan:  He has completed 3 months of anticoagulation therapy for a clearly provoked venous thromboembolic event. Thus there are no further indications for him to stay on indefinite anticoagulation therapy.     I did explain to him that his varicocele is the cause of his intraluminal thrombus in the scrotal vein. In general, this do not require anticoagulation therapy.     I do recommend that this patient should receive aggressive mechanical DVT/PE and/or pharmacological DVT/PE prophylaxis in the future if she should be in situation for increase risk of venous thromboembolism. These situations include but not limited to: 1) Prolong immobility or hospitalization of >24 hours; 2) Surgery, especially orthopedic type surgery; 3) Traumatic injury etc....     Thus for future long distance car or air travels of >4 hours, he is instructed to take a dose of rivaroxaban just prior to his trip. Since he has about 25 tablets of rivaroxaban at 20 mg left, he is instructed to use these for his future trips. Then after that, he can decrease the dose of rivaroxaban to 10 mg PO Q 24 hours as needed for long distance car or air travels of >4 hours.    I explain to Lenard that if Dr. Gage is comfortable of prescribing his rivaroxaban prophylactic tablets in the future, then he does not necessarily " need to see me back for follow up. However, if he would like me to continue to prescribe his rivaroxaban in the future, then I will see him back annually to refill his prescription.     Lenard has a few other questions today for which I have answered them all to his satisfaction.       Video-Visit Details:  Type of service:  Video Visit  Video Start Time:  14:30  Video End Time (time video stopped): 14:50  Originating Location (pt. Location): Home  Distant Location (provider location):  [facility]   Mode of Communication:  Video Conference via [employer]      Kailash Montaño PA-C, MPAS  Physician Assistant  [facility].     30 minutes spent by me on the date of the encounter doing chart review, history and exam, documentation and further activities per the note      Patient was contacted to complete the pre-visit call prior to their telephone visit with the provider.     Allergies and medications were reviewed.     I thanked them for their time to cover this information.     Desire Mancilla MA

## 2024-04-29 NOTE — PROGRESS NOTES
Patient was contacted to complete the pre-visit call prior to their telephone visit with the provider.     Allergies and medications were reviewed.     I thanked them for their time to cover this information.     Desire Mancilla MA

## 2024-04-29 NOTE — PATIENT INSTRUCTIONS
Lenard,    It was nice to see you via video visit today.    Below is a summary of our plan:  Please discontinue taking your rivaroxaban (Xarelto) as of today.  For future long distance car or plane travels of >4 hours, please take a dose of Xarelto 20 mg or 10 mg by mouth every 24 hours as needed. You can continue with taking the 20 mg tablets that remains until they are gone or they are . Then in the future, all you need is the 10 mg tablets.   If Dr. Gage is willing to continue to prescribe your as needed Xarelto in the future, then you do not need to see me back on a routine basis. However, if Dr. Gage would llike me to continue with the prescription, then I will plan to see you back once yearly to refill your prescription. Virtual visit is fine.   If you should have any further questions or concerns, please call [phone] and ask to speak to a nursing staff.     Thank you once again in choosing our clinic as part of your healthcare team.      Kailash Montaño PA-C, MPAS  Physician Assistant  [facility].

## 2024-05-08 NOTE — PROGRESS NOTES
HPI:    Last visit with us was 2/7/2024 and additional details in that note. Overall stable. He still has testicular pain and was seen by Dr. De Oliveira 4/11/2024. Urology he had a testicular U/S 3/20/2024 that showed variocele's and he may consider surgery. He also has a psychiatry appt. 6/18/2024 to discuss ADHD mediation. No other HEENT, cardiopulmonary, abdominal, , neurological, systemic psychiatric, lymphatic, endocrine, vascular complaints.     Past Medical History:   Diagnosis Date    ADHD     Asthma     Dyslipidemia     Obesity      Past Surgical History:   Procedure Laterality Date    TONSILLECTOMY Bilateral     childhood     PE:    Vitals noted, gen, nad, cooperative, alert, neck supple nl rom, lungs with good air movement, RRR, S1, S2, no MRG, abdomen, no acute findings. Grossly normal neurological exam.     US Lower Extremity Venous Duplex Left  Narrative: EXAMINATION: US LOWER EXTREMITY VENOUS DUPLEX LEFT  4/23/2024 11:38 AM       CLINICAL HISTORY: History of left distal leg DVT. Please evaluate for  residual thrombus after 3 months of anticoagulation.    COMPARISON: 2/2/2024        PROCEDURE COMMENTS: Ultrasound was performed of the deep venous system  of the left lower extremity using grayscale, color, and spectral  Doppler.    FINDINGS:  The left common femoral, greater saphenous origin, femoral, popliteal,  and deep calf veins are visualized and are patent. Venous waveforms  are normal. There is normal response to compression.    The right common femoral vein is visualized and is patent.  Impression: IMPRESSION:.  No deep vein thrombosis in the left lower extremity.    I have personally reviewed the examination and initial interpretation  and I agree with the findings.    MASSIEL RUEDA         SYSTEM ID:  [id number]      A/P:    1. New DVT L LE and he now off  Xarelto (he completed 3 months of treatment) and he saw Kailash Montaño, Hematology 4/29/2024. Recommend prevention anti-coagulation before travel    2. ADHD on Methylphenidate. He has a psychiatry appt. 6/18/2024. I can take over this Rx. After he sees Psychiatry.   3. Reactive airway disease. He uses Flovent and Albuterol MDI's  4. Elevated ALT (90 on 1/31/2024). He has a h/o hepatic steatosis (last abdominal U/S 8/1/2020). He has a Hepatology appt. With Dr. Cotto 6/10/2024.   5. STD testing 1/31/2024 and 8/14/2023.   6. Dermatology; he has a 8/29/2024 appt with Dr. Guzmán.   7. R knee pain; X-ray 2/7/2024. He had an Sports Medicine appt. 3/19/2024.   8. Pelvic pain; see imaging from 11/4/2022, 10/4/2022, and 9/8/2022. He was seen 4/11/2024 by Dr. De Oliveira for long-standing scrotal pain. He has a 5/23/2024 Pain Clinic appt. With Dr. Marinelli. He had a testicular U/S done 3/20/2024 that showed varicocele and he may consider surgery      30 minutes spent on the date of the encounter doing chart review, history and exam, documentation and further activities as noted above exclusive of procedures and other billable interpretations

## 2024-05-09 ENCOUNTER — OFFICE VISIT (OUTPATIENT)
Dept: INTERNAL MEDICINE | Facility: CLINIC | Age: 29
End: 2024-05-09
Payer: COMMERCIAL

## 2024-05-09 VITALS
BODY MASS INDEX: 34.77 KG/M2 | WEIGHT: 242.3 LBS | DIASTOLIC BLOOD PRESSURE: 87 MMHG | SYSTOLIC BLOOD PRESSURE: 127 MMHG | HEART RATE: 97 BPM | OXYGEN SATURATION: 97 %

## 2024-05-09 DIAGNOSIS — F90.9 ATTENTION DEFICIT HYPERACTIVITY DISORDER (ADHD), UNSPECIFIED ADHD TYPE: Primary | ICD-10-CM

## 2024-05-09 PROCEDURE — 99214 OFFICE O/P EST MOD 30 MIN: CPT | Performed by: INTERNAL MEDICINE

## 2024-05-09 NOTE — PROGRESS NOTES
Lenard is a 28 year old that presents in clinic today for the following:     Chief Complaint   Patient presents with    Follow Up     3 MONTHS FOLLOW UP            5/9/2024     9:06 AM   Additional Questions   Roomed by MR     Screenings from encounters over the past 10 days    No data recorded       Deena Sawyer, EMT at 9:08 AM on 5/9/2024

## 2024-05-13 ENCOUNTER — MYC MEDICAL ADVICE (OUTPATIENT)
Dept: INTERNAL MEDICINE | Facility: CLINIC | Age: 29
End: 2024-05-13
Payer: COMMERCIAL

## 2024-05-13 DIAGNOSIS — F90.9 ATTENTION DEFICIT HYPERACTIVITY DISORDER (ADHD), UNSPECIFIED ADHD TYPE: ICD-10-CM

## 2024-05-13 RX ORDER — METHYLPHENIDATE HYDROCHLORIDE 36 MG/1
72 TABLET ORAL EVERY MORNING
Qty: 60 TABLET | Refills: 0 | Status: SHIPPED | OUTPATIENT
Start: 2024-05-13 | End: 2024-06-03

## 2024-05-16 ASSESSMENT — PAIN SCALES - PAIN ENJOYMENT GENERAL ACTIVITY SCALE (PEG)
PEG_TOTALSCORE: 2
INTERFERED_ENJOYMENT_LIFE: 2
AVG_PAIN_PASTWEEK: 3
PEG_TOTALSCORE: 2
INTERFERED_GENERAL_ACTIVITY: 1
INTERFERED_ENJOYMENT_LIFE: 2
AVG_PAIN_PASTWEEK: 3
INTERFERED_GENERAL_ACTIVITY: 1

## 2024-05-16 ASSESSMENT — ANXIETY QUESTIONNAIRES
IF YOU CHECKED OFF ANY PROBLEMS ON THIS QUESTIONNAIRE, HOW DIFFICULT HAVE THESE PROBLEMS MADE IT FOR YOU TO DO YOUR WORK, TAKE CARE OF THINGS AT HOME, OR GET ALONG WITH OTHER PEOPLE: NOT DIFFICULT AT ALL
3. WORRYING TOO MUCH ABOUT DIFFERENT THINGS: NOT AT ALL
GAD7 TOTAL SCORE: 0
8. IF YOU CHECKED OFF ANY PROBLEMS, HOW DIFFICULT HAVE THESE MADE IT FOR YOU TO DO YOUR WORK, TAKE CARE OF THINGS AT HOME, OR GET ALONG WITH OTHER PEOPLE?: NOT DIFFICULT AT ALL
2. NOT BEING ABLE TO STOP OR CONTROL WORRYING: NOT AT ALL
7. FEELING AFRAID AS IF SOMETHING AWFUL MIGHT HAPPEN: NOT AT ALL
4. TROUBLE RELAXING: NOT AT ALL
7. FEELING AFRAID AS IF SOMETHING AWFUL MIGHT HAPPEN: NOT AT ALL
GAD7 TOTAL SCORE: 0
1. FEELING NERVOUS, ANXIOUS, OR ON EDGE: NOT AT ALL
5. BEING SO RESTLESS THAT IT IS HARD TO SIT STILL: NOT AT ALL
6. BECOMING EASILY ANNOYED OR IRRITABLE: NOT AT ALL

## 2024-05-23 ENCOUNTER — MYC REFILL (OUTPATIENT)
Dept: INTERNAL MEDICINE | Facility: CLINIC | Age: 29
End: 2024-05-23

## 2024-05-23 ENCOUNTER — OFFICE VISIT (OUTPATIENT)
Dept: ANESTHESIOLOGY | Facility: CLINIC | Age: 29
End: 2024-05-23
Attending: UROLOGY
Payer: COMMERCIAL

## 2024-05-23 VITALS
HEART RATE: 97 BPM | DIASTOLIC BLOOD PRESSURE: 82 MMHG | BODY MASS INDEX: 35.84 KG/M2 | SYSTOLIC BLOOD PRESSURE: 125 MMHG | OXYGEN SATURATION: 97 % | HEIGHT: 69 IN | WEIGHT: 242 LBS

## 2024-05-23 DIAGNOSIS — M79.2 NEURALGIA: Primary | ICD-10-CM

## 2024-05-23 DIAGNOSIS — R05.9 COUGH, UNSPECIFIED TYPE: ICD-10-CM

## 2024-05-23 DIAGNOSIS — K76.0 NAFLD (NONALCOHOLIC FATTY LIVER DISEASE): Primary | ICD-10-CM

## 2024-05-23 DIAGNOSIS — R10.2 PELVIC PAIN IN MALE: ICD-10-CM

## 2024-05-23 DIAGNOSIS — I86.1 BILATERAL VARICOCELES: ICD-10-CM

## 2024-05-23 DIAGNOSIS — Z11.3 SCREEN FOR STD (SEXUALLY TRANSMITTED DISEASE): ICD-10-CM

## 2024-05-23 DIAGNOSIS — M25.511 CHRONIC RIGHT SHOULDER PAIN: ICD-10-CM

## 2024-05-23 DIAGNOSIS — G89.29 CHRONIC RIGHT SHOULDER PAIN: ICD-10-CM

## 2024-05-23 DIAGNOSIS — N50.812 PAIN IN LEFT TESTICLE: ICD-10-CM

## 2024-05-23 DIAGNOSIS — G58.8 NEURALGIA OF LEFT PUDENDAL NERVE: ICD-10-CM

## 2024-05-23 DIAGNOSIS — G58.8 GENITOFEMORAL NEURALGIA OF LEFT SIDE: ICD-10-CM

## 2024-05-23 PROCEDURE — 99204 OFFICE O/P NEW MOD 45 MIN: CPT | Performed by: ANESTHESIOLOGY

## 2024-05-23 RX ORDER — PREGABALIN 50 MG/1
50 CAPSULE ORAL 2 TIMES DAILY
Qty: 60 CAPSULE | Refills: 0 | Status: SHIPPED | OUTPATIENT
Start: 2024-05-23

## 2024-05-23 ASSESSMENT — ENCOUNTER SYMPTOMS
NEUROLOGICAL NEGATIVE: 1
GASTROINTESTINAL NEGATIVE: 1
EYES NEGATIVE: 1
CARDIOVASCULAR NEGATIVE: 1
RESPIRATORY NEGATIVE: 1
CONSTITUTIONAL NEGATIVE: 1
MYALGIAS: 1

## 2024-05-23 ASSESSMENT — PAIN SCALES - GENERAL: PAINLEVEL: MILD PAIN (3)

## 2024-05-23 NOTE — PATIENT INSTRUCTIONS
Medications:    pregabalin (LYRICA) 50 MG capsule. Take 1 capsule (50 mg) by mouth 2 times daily. Follow titration instructions as outline below-      Lyrica 1 tab= 25mg   The prescription will not give the full details as outlined below.    AM     PM                     0                 50mg   If tolerating, after 3-4 days, increase as tolerated to next line  50mg                 50mg   If tolerating, after 3-4 days, increase as tolerated to next line  50mg                           100mg If tolerating, after 3-4 days, increase as tolerated to next line  100mg                          100mg.  Don't drive on this medication until you know how it effects you.  Common side effects are drowsiness and dizziness  You can go slower if any side effects  Once on higher doses, you cannot stop this medication abruptly.  Tapering instructions would need to be provided by a medical professional.   up to 100 mg twice a day      *Please provide the clinic with a minium of 1 week notice, on all prescription refills.       Recommended Follow up:      Follow up as needed.    To speak with a nurse, schedule/reschedule/cancel a clinic appointment, or request a medication refill call: [phone]    You can also reach us by [employer]: [email or web address]

## 2024-05-23 NOTE — NURSING NOTE
RN reviewed AVS with patient. Patient to contact clinic if any questions/concerns. Patient verbalized understanding.    Helen Ramires RN
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
## 2024-05-23 NOTE — NURSING NOTE
Patient presents with:  Consult: Consult Left Guillermo Pain      Mild Pain (3)         What medications are you using for pain? Nothing    (New patients only) Have you been seen by another pain clinic/ provider? no    (Return Patients only) What refills are you needing today? No    Expectation Not Sure
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
## 2024-05-24 RX ORDER — ALBUTEROL SULFATE 90 UG/1
1-2 AEROSOL, METERED RESPIRATORY (INHALATION) EVERY 6 HOURS PRN
Qty: 18 G | Refills: 3 | Status: SHIPPED | OUTPATIENT
Start: 2024-05-24

## 2024-05-24 NOTE — TELEPHONE ENCOUNTER
LVD:  5/9/2024  [facility] Internal Medicine Layo Vaughn MD  Internal Medicine Attention deficit hyperactivity disorder (ADHD), unspecified ADHD type     Refilled per protocol.

## 2024-06-03 ENCOUNTER — MYC MEDICAL ADVICE (OUTPATIENT)
Dept: INTERNAL MEDICINE | Facility: CLINIC | Age: 29
End: 2024-06-03
Payer: COMMERCIAL

## 2024-06-03 ENCOUNTER — PRE VISIT (OUTPATIENT)
Dept: PSYCHIATRY | Facility: CLINIC | Age: 29
End: 2024-06-03
Payer: COMMERCIAL

## 2024-06-03 DIAGNOSIS — F90.9 ATTENTION DEFICIT HYPERACTIVITY DISORDER (ADHD), UNSPECIFIED ADHD TYPE: ICD-10-CM

## 2024-06-03 RX ORDER — METHYLPHENIDATE HYDROCHLORIDE 36 MG/1
72 TABLET ORAL EVERY MORNING
Qty: 60 TABLET | Refills: 0 | Status: SHIPPED | OUTPATIENT
Start: 2024-06-13 | End: 2024-07-17

## 2024-06-03 NOTE — TELEPHONE ENCOUNTER
PSYCHIATRY CLINIC PHONE INTAKE     SERVICES REQUESTED / INTERESTED IN          Med Management    Presenting Problem and Brief History                              What would you like to be seen for? (brief description):    Pt had insurance change, moving all care into [place]     ADHD med management - PCP will take over ADHD rx after psych consult    Patient said he didn't want to schedule with MTM. Said he only takes the ADHD medication and hasn't taken other medications.    Originally scheduled with Jayne Villalta, patient will be out of town on the date scheduled and service line did not having anything else before October, was instructed to call this clinic for an earlier available provider/date.    Have you received a mental health diagnosis? Yes   Which one (s): ADHD  Is there any history of developmental delay?  No   Are you currently seeing a mental health provider?  yes          Who / month last seen:  therapist at [facility]  Do you have mental health records elsewhere?  Yes [facility]  Will you sign a release so we can obtain them?  Yes    Have you ever been hospitalized for psychiatric reasons?  No  Describe:  na    Do you have current thoughts of self-harm?  No    Do you currently have thoughts of harming others?  No    Do you have any safety concerns? No   If yes to these, offer to reach out to a  for follow up.      Substance Use History     Do you have any history of alcohol  or other substance use?  No  Describe:  na  Have you ever received treatment for this?  No    Describe:  na     Social History     Who is the patient's a guardian?   self     Name / number: self  Have you had an ACT team in last 12 months?  No  Describe: na   OK to leave a detailed voicemail?  Yes      Medical/ Surgical History                                 There is no problem list on file for this patient.         Medications             Have you taken >3 psychiatric medications in your  past?  n  Do you currently take 5 or more medications, including prescriptions, supplements, and other over the counter products?  n    If YES to at least one of these questions:   As part of your evaluation in our clinic, we have specially trained pharmacists as part of your care team. Your provider would like for you to meet with one of our pharmacists to review your current and past medications, ensure your med list is up to date, and queue up any questions or concerns you have about medications. They will review all of your medications, not just for mental health, to help ensure you know what you re taking and that everything is working together.     Please schedule patient in [facility] PSYCHIATRY (Gabriela Mcdermott or Mana Quinteros) for 60m MTM in any green space as virtual (video), telephone, or in person (designated in person days per Epic templates).  -Appt notes can say  Psych eval on xx/xx   -Route telephone encounter to the pharmacist who will be seeing the patient.  If patient has questions about insurance coverage or billing, please still schedule the visit and refer them to call the [facility] coordinators at [phone].    Current Outpatient Medications   Medication Sig Dispense Refill    albuterol (PROAIR HFA/PROVENTIL HFA/VENTOLIN HFA) 108 (90 Base) MCG/ACT inhaler Inhale 1-2 puffs into the lungs every 6 hours as needed for shortness of breath or wheezing 18 g 3    clindamycin (CLINDAMAX) 1 % external gel Apply topically 2 times daily 100 mL 1    FLOVENT  MCG/ACT inhaler INHALE 2 PUFFS TWICE A DAY FOR TWO WEEKS AT ONSET OF URI. RINSE AFTER USE. 12 g 1    fluticasone (FLOVENT HFA) 110 MCG/ACT inhaler INHALE 2 PUFFS TWICE A DAY FOR TWO WEEKS AT ONSET OF URI. RINSE AFTER USE. 1 g 3    guanFACINE (TENEX) 1 MG tablet TAKE 1 TABLET BY MOUTH EVERY DAY IN THE MORNING      Methylphenidate HCl ER 36 MG 24H tablet Take 72 mg by mouth      methylphenidate HCl ER, OSM, (CONCERTA) 36 MG CR tablet Take 2 tablets  (72 mg) by mouth every morning 60 tablet 0    mometasone furoate (ASMANEX HFA) 100 MCG/ACT inhaler Inhale 2 puffs into the lungs 2 times daily For 2 weeks at onset of URI 13 g 3    pregabalin (LYRICA) 50 MG capsule Take 1 capsule (50 mg) by mouth 2 times daily 60 capsule 0    rivaroxaban ANTICOAGULANT (XARELTO) 20 MG TABS tablet Take 1 tablet (20 mg) by mouth daily (with dinner) 30 tablet 1    rivaroxaban ANTICOAGULANT (XARELTO) 20 MG TABS tablet Take 1 tablet (20 mg) by mouth daily (with dinner) 90 tablet 0    sildenafil (REVATIO) 20 MG tablet Take 1-2 tablets (20-40 mg) by mouth daily as needed (take 1 hour before intercourse as needed.) May increase up to 100mg dose ( 5 tablets) if needed.  Use lowest effective dose. 30 tablet 3         DISPOSITION      Phone screen completed with patient. Scheduled virtual AGE w Marianna Jensen on 7/8/24. Emailed new patient packet.    Complex  Paula Silva

## 2024-06-04 ENCOUNTER — MYC REFILL (OUTPATIENT)
Dept: INTERNAL MEDICINE | Facility: CLINIC | Age: 29
End: 2024-06-04
Payer: COMMERCIAL

## 2024-06-04 DIAGNOSIS — F41.9 ANXIETY: Primary | ICD-10-CM

## 2024-06-04 RX ORDER — GUANFACINE 1 MG/1
1 TABLET ORAL EVERY MORNING
Qty: 90 TABLET | Refills: 2 | Status: SHIPPED | OUTPATIENT
Start: 2024-06-04 | End: 2024-06-12 | Stop reason: DRUGHIGH

## 2024-06-04 RX ORDER — METHYLPHENIDATE HYDROCHLORIDE 36 MG/1
72 TABLET, EXTENDED RELEASE ORAL
OUTPATIENT
Start: 2024-06-04

## 2024-06-06 ENCOUNTER — TELEPHONE (OUTPATIENT)
Dept: PSYCHIATRY | Facility: CLINIC | Age: 29
End: 2024-06-06
Payer: COMMERCIAL

## 2024-06-06 NOTE — TELEPHONE ENCOUNTER
On 06/04/2021 the patient signed an GEOVANY authorizing the release of records to [facility] from [facility] (Fax - [phone]).  I sent the document to scanning and the above listed clinic.    - Quoc Daniel, Visit Facilitator

## 2024-06-10 ENCOUNTER — LAB (OUTPATIENT)
Dept: LAB | Facility: CLINIC | Age: 29
End: 2024-06-10
Payer: COMMERCIAL

## 2024-06-10 ENCOUNTER — TRANSFERRED RECORDS (OUTPATIENT)
Dept: HEALTH INFORMATION MANAGEMENT | Facility: CLINIC | Age: 29
End: 2024-06-10

## 2024-06-10 ENCOUNTER — OFFICE VISIT (OUTPATIENT)
Dept: GASTROENTEROLOGY | Facility: CLINIC | Age: 29
End: 2024-06-10
Attending: INTERNAL MEDICINE
Payer: COMMERCIAL

## 2024-06-10 ENCOUNTER — MYC REFILL (OUTPATIENT)
Dept: INTERNAL MEDICINE | Facility: CLINIC | Age: 29
End: 2024-06-10

## 2024-06-10 VITALS
BODY MASS INDEX: 37.01 KG/M2 | DIASTOLIC BLOOD PRESSURE: 83 MMHG | WEIGHT: 249.9 LBS | HEART RATE: 72 BPM | HEIGHT: 69 IN | OXYGEN SATURATION: 99 % | SYSTOLIC BLOOD PRESSURE: 130 MMHG

## 2024-06-10 DIAGNOSIS — K76.0 NAFLD (NONALCOHOLIC FATTY LIVER DISEASE): ICD-10-CM

## 2024-06-10 DIAGNOSIS — K76.0 NAFLD (NONALCOHOLIC FATTY LIVER DISEASE): Primary | ICD-10-CM

## 2024-06-10 DIAGNOSIS — R21 RASH: ICD-10-CM

## 2024-06-10 LAB
ALBUMIN SERPL BCG-MCNC: 4.5 G/DL (ref 3.5–5.2)
ALP SERPL-CCNC: 84 U/L (ref 40–150)
ALT SERPL W P-5'-P-CCNC: 80 U/L (ref 0–70)
ANION GAP SERPL CALCULATED.3IONS-SCNC: 10 MMOL/L (ref 7–15)
AST SERPL W P-5'-P-CCNC: 48 U/L (ref 0–45)
BILIRUB DIRECT SERPL-MCNC: <0.2 MG/DL (ref 0–0.3)
BILIRUB SERPL-MCNC: 0.4 MG/DL
BUN SERPL-MCNC: 19.6 MG/DL (ref 6–20)
CALCIUM SERPL-MCNC: 9.1 MG/DL (ref 8.6–10)
CHLORIDE SERPL-SCNC: 103 MMOL/L (ref 98–107)
CREAT SERPL-MCNC: 1.1 MG/DL (ref 0.67–1.17)
DEPRECATED HCO3 PLAS-SCNC: 27 MMOL/L (ref 22–29)
EGFRCR SERPLBLD CKD-EPI 2021: >90 ML/MIN/1.73M2
ERYTHROCYTE [DISTWIDTH] IN BLOOD BY AUTOMATED COUNT: 13.1 % (ref 10–15)
GLUCOSE SERPL-MCNC: 134 MG/DL (ref 70–99)
HCT VFR BLD AUTO: 46.5 % (ref 40–53)
HGB BLD-MCNC: 15.9 G/DL (ref 13.3–17.7)
INR PPP: 0.96 (ref 0.85–1.15)
MCH RBC QN AUTO: 29.3 PG (ref 26.5–33)
MCHC RBC AUTO-ENTMCNC: 34.2 G/DL (ref 31.5–36.5)
MCV RBC AUTO: 86 FL (ref 78–100)
PLATELET # BLD AUTO: 235 10E3/UL (ref 150–450)
POTASSIUM SERPL-SCNC: 4.3 MMOL/L (ref 3.4–5.3)
PROT SERPL-MCNC: 6.7 G/DL (ref 6.4–8.3)
RBC # BLD AUTO: 5.43 10E6/UL (ref 4.4–5.9)
SODIUM SERPL-SCNC: 140 MMOL/L (ref 135–145)
WBC # BLD AUTO: 6.1 10E3/UL (ref 4–11)

## 2024-06-10 PROCEDURE — 99213 OFFICE O/P EST LOW 20 MIN: CPT | Performed by: INTERNAL MEDICINE

## 2024-06-10 PROCEDURE — 82248 BILIRUBIN DIRECT: CPT | Performed by: PATHOLOGY

## 2024-06-10 PROCEDURE — 85027 COMPLETE CBC AUTOMATED: CPT | Performed by: PATHOLOGY

## 2024-06-10 PROCEDURE — G0463 HOSPITAL OUTPT CLINIC VISIT: HCPCS | Performed by: INTERNAL MEDICINE

## 2024-06-10 PROCEDURE — 85610 PROTHROMBIN TIME: CPT | Performed by: PATHOLOGY

## 2024-06-10 PROCEDURE — 80053 COMPREHEN METABOLIC PANEL: CPT | Performed by: PATHOLOGY

## 2024-06-10 PROCEDURE — 36415 COLL VENOUS BLD VENIPUNCTURE: CPT | Performed by: PATHOLOGY

## 2024-06-10 ASSESSMENT — PAIN SCALES - GENERAL: PAINLEVEL: NO PAIN (0)

## 2024-06-10 NOTE — NURSING NOTE
"Chief Complaint   Patient presents with    RECHECK     /83   Pulse 72   Ht 1.753 m (5' 9\")   Wt 113.4 kg (249 lb 14.4 oz)   SpO2 99%   BMI 36.90 kg/m    Chandler Novak MA on 6/10/2024 at 10:51 AM    "

## 2024-06-10 NOTE — PROGRESS NOTES
[facility] Hepatology    Follow-up Visit    Follow-up visit for MASJODIE    Subjective:  28 year old male    Last visit Jan 2022.  Patient was diagnosed with RLE DVT in Jan 2024 related to several long-haul flights.  He has since completed 3 months of rivaroxaban.  No ER visits or hospital admissions since last visit.    Patient is well today.  He has no symptoms related to liver disease.    Patient denies jaundice, lower extremity edema, abdominal distension, lethargy or confusion.    Patient denies melena, hematemesis or hematochezia.    Patient denies fevers, sweats or chills.      Weight stable.  Appetite is good.    Patient drinks 3 alcoholic drinks per week.  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      Medical hx Surgical hx   Past Medical History:   Diagnosis Date    ADHD     Asthma     Dyslipidemia     Obesity       Past Surgical History:   Procedure Laterality Date    TONSILLECTOMY Bilateral     childhood          Medications  Prior to Admission medications    Medication Sig Start Date End Date Taking? Authorizing Provider   albuterol (PROAIR HFA/PROVENTIL HFA/VENTOLIN HFA) 108 (90 Base) MCG/ACT inhaler Inhale 1-2 puffs into the lungs every 6 hours as needed for shortness of breath or wheezing 5/24/24  Yes Layo Gage MD   clindamycin (CLINDAMAX) 1 % external gel Apply topically 2 times daily 1/11/24  Yes Layo Gage MD   fluticasone (FLOVENT HFA) 110 MCG/ACT inhaler INHALE 2 PUFFS TWICE A DAY FOR TWO WEEKS AT ONSET OF URI. RINSE AFTER USE. 2/12/24  Yes Layo Gage MD   guanFACINE (TENEX) 1 MG tablet Take 1 tablet (1 mg) by mouth every morning 6/4/24  Yes Layo Gage MD   methylphenidate HCl ER, OSM, (CONCERTA) 36 MG CR tablet Take 2 tablets (72 mg) by mouth every morning 6/13/24  Yes Layo Gage MD   pregabalin (LYRICA) 50 MG capsule Take 1 capsule (50 mg) by mouth 2 times daily 5/23/24  Yes Trinidad Marinelli MD   sildenafil (REVATIO) 20  "MG tablet Take 1-2 tablets (20-40 mg) by mouth daily as needed (take 1 hour before intercourse as needed.) May increase up to 100mg dose ( 5 tablets) if needed.  Use lowest effective dose. 2/23/23  Yes Layo Gage MD       Allergies  Allergies   Allergen Reactions    Seasonal Allergies        Review of systems  A 10-point review of systems was negative    Examination  /83   Pulse 72   Ht 1.753 m (5' 9\")   Wt 113.4 kg (249 lb 14.4 oz)   SpO2 99%   BMI 36.90 kg/m    Body mass index is 36.9 kg/m .    Gen- well, NAD, A+Ox3, normal color  CVS- RRR  RS- CTA  Abd- overweight, soft, non-tender, palpable liver edge, no ascites or splenomegaly on palpation or percussion, BS+  Extr- hands normal, no CHRISSY  Skin- no rash or jaundice  Neuro- no asterixis  Psych- normal mood    Laboratory  Lab Results   Component Value Date     06/10/2024     06/16/2020    POTASSIUM 4.3 06/10/2024    POTASSIUM 3.8 10/18/2021    POTASSIUM 4.3 06/16/2020    CHLORIDE 103 06/10/2024    CHLORIDE 109 10/18/2021    CHLORIDE 110 06/16/2020    CO2 27 06/10/2024    CO2 29 10/18/2021    CO2 28 06/16/2020    BUN 19.6 06/10/2024    BUN 18 10/18/2021    BUN 16 06/16/2020    CR 1.10 06/10/2024    CR 1.04 06/16/2020       Lab Results   Component Value Date    BILITOTAL 0.4 06/10/2024    BILITOTAL 0.4 07/21/2020    ALT 80 06/10/2024     07/21/2020    AST 48 06/10/2024    AST 88 07/21/2020    ALKPHOS 84 06/10/2024    ALKPHOS 84 07/21/2020       Lab Results   Component Value Date    ALBUMIN 4.5 06/10/2024    ALBUMIN 4.4 10/18/2021    ALBUMIN 4.1 07/21/2020    PROTTOTAL 6.7 06/10/2024    PROTTOTAL 7.4 07/21/2020        Lab Results   Component Value Date    WBC 6.1 06/10/2024    WBC 6.6 06/16/2020    HGB 15.9 06/10/2024    HGB 16.0 06/16/2020    MCV 86 06/10/2024    MCV 88 06/16/2020     06/10/2024     06/16/2020       Lab Results   Component Value Date    INR 0.96 06/10/2024       Radiology  Nil " recent    Assessment  28 year old male who presents for follow-up of MASLD.  No clinical or biochemical evidence of cirrhosis.  FIBD-4= 0.62, which indicates low risk for advanced liver disease.  No additional testing indicated.  We discussed the importance of weight loss with regard to improvement and resolution of MASLD as well as prevention of diabetes, which increases the risk of CAD and CVA.  Patient can follow-up with his PCP.    Plan  Weight loss with diet and exercise  Follow-up with PCP    Lisandro Cotto MD  Hepatology  [facility]

## 2024-06-10 NOTE — LETTER
6/10/2024      Miguel Angel Hernandez  [address] [address]      Dear Colleague,    Thank you for referring your patient, Miguel Angel Hernandez, to the [facility] [place]. Please see a copy of my visit note below.    [facility] Hepatology    Follow-up Visit    Follow-up visit for MASLD    Subjective:  28 year old male    Last visit Jan 2022.  Patient was diagnosed with RLE DVT in Jan 2024 related to several long-haul flights.  He has since completed 3 months of rivaroxaban.  No ER visits or hospital admissions since last visit.    Patient is well today.  He has no symptoms related to liver disease.    Patient denies jaundice, lower extremity edema, abdominal distension, lethargy or confusion.    Patient denies melena, hematemesis or hematochezia.    Patient denies fevers, sweats or chills.      Weight stable.  Appetite is good.    Patient drinks 3 alcoholic drinks per week.  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      Medical hx Surgical hx   Past Medical History:   Diagnosis Date     ADHD      Asthma      Dyslipidemia      Obesity       Past Surgical History:   Procedure Laterality Date     TONSILLECTOMY Bilateral     childhood          Medications  Prior to Admission medications    Medication Sig Start Date End Date Taking? Authorizing Provider   albuterol (PROAIR HFA/PROVENTIL HFA/VENTOLIN HFA) 108 (90 Base) MCG/ACT inhaler Inhale 1-2 puffs into the lungs every 6 hours as needed for shortness of breath or wheezing 5/24/24  Yes Layo Gage MD   clindamycin (CLINDAMAX) 1 % external gel Apply topically 2 times daily 1/11/24  Yes Layo Gage MD   fluticasone (FLOVENT HFA) 110 MCG/ACT inhaler INHALE 2 PUFFS TWICE A DAY FOR TWO WEEKS AT ONSET OF URI. RINSE AFTER USE. 2/12/24  Yes Layo Gage MD   guanFACINE (TENEX) 1 MG tablet Take 1 tablet (1 mg) by mouth every morning 6/4/24  Yes Layo Gage MD  "  methylphenidate HCl ER, OSM, (CONCERTA) 36 MG CR tablet Take 2 tablets (72 mg) by mouth every morning 6/13/24  Yes Layo Gage MD   pregabalin (LYRICA) 50 MG capsule Take 1 capsule (50 mg) by mouth 2 times daily 5/23/24  Yes Trinidad Marinelli MD   sildenafil (REVATIO) 20 MG tablet Take 1-2 tablets (20-40 mg) by mouth daily as needed (take 1 hour before intercourse as needed.) May increase up to 100mg dose ( 5 tablets) if needed.  Use lowest effective dose. 2/23/23  Yes Layo Gage MD       Allergies  Allergies   Allergen Reactions     Seasonal Allergies        Review of systems  A 10-point review of systems was negative    Examination  /83   Pulse 72   Ht 1.753 m (5' 9\")   Wt 113.4 kg (249 lb 14.4 oz)   SpO2 99%   BMI 36.90 kg/m    Body mass index is 36.9 kg/m .    Gen- well, NAD, A+Ox3, normal color  CVS- RRR  RS- CTA  Abd- overweight, soft, non-tender, palpable liver edge, no ascites or splenomegaly on palpation or percussion, BS+  Extr- hands normal, no CHRISSY  Skin- no rash or jaundice  Neuro- no asterixis  Psych- normal mood    Laboratory  Lab Results   Component Value Date     06/10/2024     06/16/2020    POTASSIUM 4.3 06/10/2024    POTASSIUM 3.8 10/18/2021    POTASSIUM 4.3 06/16/2020    CHLORIDE 103 06/10/2024    CHLORIDE 109 10/18/2021    CHLORIDE 110 06/16/2020    CO2 27 06/10/2024    CO2 29 10/18/2021    CO2 28 06/16/2020    BUN 19.6 06/10/2024    BUN 18 10/18/2021    BUN 16 06/16/2020    CR 1.10 06/10/2024    CR 1.04 06/16/2020       Lab Results   Component Value Date    BILITOTAL 0.4 06/10/2024    BILITOTAL 0.4 07/21/2020    ALT 80 06/10/2024     07/21/2020    AST 48 06/10/2024    AST 88 07/21/2020    ALKPHOS 84 06/10/2024    ALKPHOS 84 07/21/2020       Lab Results   Component Value Date    ALBUMIN 4.5 06/10/2024    ALBUMIN 4.4 10/18/2021    ALBUMIN 4.1 07/21/2020    PROTTOTAL 6.7 06/10/2024    PROTTOTAL 7.4 07/21/2020        Lab Results   Component Value Date    " WBC 6.1 06/10/2024    WBC 6.6 06/16/2020    HGB 15.9 06/10/2024    HGB 16.0 06/16/2020    MCV 86 06/10/2024    MCV 88 06/16/2020     06/10/2024     06/16/2020       Lab Results   Component Value Date    INR 0.96 06/10/2024       Radiology  Nil recent    Assessment  28 year old male who presents for follow-up of MASLD.  No clinical or biochemical evidence of cirrhosis.  FIBD-4= 0.62, which indicates low risk for advanced liver disease.  No additional testing indicated.  We discussed the importance of weight loss with regard to improvement and resolution of MASLD as well as prevention of diabetes, which increases the risk of CAD and CVA.  Patient can follow-up with his PCP.    Plan  Weight loss with diet and exercise  Follow-up with PCP    Lisandro Cotto MD  Hepatology  [facility]

## 2024-06-12 ENCOUNTER — MYC MEDICAL ADVICE (OUTPATIENT)
Dept: INTERNAL MEDICINE | Facility: CLINIC | Age: 29
End: 2024-06-12
Payer: COMMERCIAL

## 2024-06-12 DIAGNOSIS — F90.9 ATTENTION DEFICIT HYPERACTIVITY DISORDER (ADHD), UNSPECIFIED ADHD TYPE: Primary | ICD-10-CM

## 2024-06-12 DIAGNOSIS — F41.9 ANXIETY: ICD-10-CM

## 2024-06-12 RX ORDER — GUANFACINE 2 MG/1
2 TABLET, EXTENDED RELEASE ORAL AT BEDTIME
Qty: 90 TABLET | Refills: 1 | Status: SHIPPED | OUTPATIENT
Start: 2024-06-12 | End: 2024-08-21

## 2024-06-13 ENCOUNTER — TELEPHONE (OUTPATIENT)
Dept: PSYCHIATRY | Facility: CLINIC | Age: 29
End: 2024-06-13
Payer: COMMERCIAL

## 2024-06-13 RX ORDER — CLINDAMYCIN PHOSPHATE 10 MG/G
GEL TOPICAL 2 TIMES DAILY
Qty: 100 ML | Refills: 1 | Status: SHIPPED | OUTPATIENT
Start: 2024-06-13

## 2024-06-13 NOTE — TELEPHONE ENCOUNTER
9 pages, including cover, of incoming records from [facility]. Sent to scanning 06/13/2024.    - Quoc Daniel, Visit Facilitator

## 2024-06-13 NOTE — TELEPHONE ENCOUNTER
clindamycin (CLEOCIN-T) 1 % external gel       Last Written Prescription Date:  1-11-24  Last Fill Quantity: 100 ml,   # refills: 1  Last Office Visit : 5-9-24  Future Office visit:  8-6-24    ml :1

## 2024-06-25 ENCOUNTER — TELEPHONE (OUTPATIENT)
Dept: INTERNAL MEDICINE | Facility: CLINIC | Age: 29
End: 2024-06-25
Payer: COMMERCIAL

## 2024-07-01 ENCOUNTER — PRE VISIT (OUTPATIENT)
Dept: UROLOGY | Facility: CLINIC | Age: 29
End: 2024-07-01
Payer: COMMERCIAL

## 2024-07-01 NOTE — TELEPHONE ENCOUNTER
Reason for visit: Microsurgical varicocele repair     Relevant information: **procedure**    Records/imaging/labs/orders: all records available    Pt called: no need for a call    At Rooming:  Standard rooming      Bandar Thao  7/1/2024  4:38 PM

## 2024-07-11 ENCOUNTER — VIRTUAL VISIT (OUTPATIENT)
Dept: PSYCHIATRY | Facility: CLINIC | Age: 29
End: 2024-07-11
Payer: COMMERCIAL

## 2024-07-11 DIAGNOSIS — F90.2 ADHD (ATTENTION DEFICIT HYPERACTIVITY DISORDER), COMBINED TYPE: Primary | ICD-10-CM

## 2024-07-11 PROCEDURE — 99205 OFFICE O/P NEW HI 60 MIN: CPT | Mod: 95

## 2024-07-11 ASSESSMENT — PATIENT HEALTH QUESTIONNAIRE - PHQ9
SUM OF ALL RESPONSES TO PHQ QUESTIONS 1-9: 0
SUM OF ALL RESPONSES TO PHQ QUESTIONS 1-9: 0
10. IF YOU CHECKED OFF ANY PROBLEMS, HOW DIFFICULT HAVE THESE PROBLEMS MADE IT FOR YOU TO DO YOUR WORK, TAKE CARE OF THINGS AT HOME, OR GET ALONG WITH OTHER PEOPLE: NOT DIFFICULT AT ALL

## 2024-07-11 ASSESSMENT — PAIN SCALES - GENERAL: PAINLEVEL: NO PAIN (0)

## 2024-07-11 NOTE — PATIENT INSTRUCTIONS
Medication Plan  -No changes    **For crisis resources, please see the information at the end of this document**   Patient Education    Thank you for coming to the [facility] [place] [facility].     Lab Testing:  If you had lab testing today and your results are reassuring or normal they will be mailed to you or sent through [employer] within 7 days. If the lab tests need quick action we will call you with the results. The phone number we will call with results is # [phone]. If this is not the best number please call our clinic and change the number.     Medication Refills:  If you need any refills please call your pharmacy and they will contact us. Our fax number for refills is [phone].   Three business days of notice are needed for general medication refill requests.   Five business days of notice are needed for controlled substance refill requests.   If you need to change to a different pharmacy, please contact the new pharmacy directly. The new pharmacy will help you get your medications transferred.     Contact Us:  Please call [phone] during business hours (8-5:00 M-F).   If you have medication related questions after clinic hours, or on the weekend, please call [phone].     Financial Assistance [phone]   Medical Records [phone]       MENTAL HEALTH CRISIS RESOURCES:  For a emergency help, please call 911 or go to the nearest Emergency Department.     Emergency Walk-In Options:   Em[facility] Unit @ [facility] ([place]): [phone] - Specialized mental health emergency area designed to be calming  [facility] ([place]): [phone]  [facility] ([place]): [phone]  [facility]): [phone]    [facility] Crisis Information:   Friend: [phone]  [place]: [phone]  Darling (NIKKI) - Adult: [phone]     Child: [phone]  [place]ugen[place] - Adult: [phone]     Child:  [phone]  [place]: [phone]  List of all [facility] resources:   https://[place].gov/dhs/people-we-serve/adults/health-care/mental-health/resources/crisis-contacts.jsp    National Crisis Information:   Crisis Text Line: Text  [place]  to 923977  Suicide & Crisis Lifeline: 988  National Suicide Prevention Lifeline: 1-529-344-TALK (1-864.430.1233)       For online chat options, visit https://suicidepreventionlifeline.org/chat/  Poison Control Center: 9-668-906-4038  Trans Lifeline: 1-570-304-1646 - Hotline for transgender people of all ages  The [employer]: 0-531-524-2693 - Hotline for LGBT youth     For Non-Emergency Support:   Fast Tracker: Mental Health & Substance Use Disorder Resources -   https://www.[employer].org/

## 2024-07-11 NOTE — PROGRESS NOTES
"Virtual Visit Details    Type of service:  Video Visit   Originating Location (pt. Location): Home    Distant Location (provider location):  Off-site  Platform used for Video Visit: [facility]   Psychiatric Collaborative Care  NEW PATIENT DIAGNOSTIC ASSESSMENT     Miguel Angel Hernandez is a 28 year old adult who prefers \"Lenard\" and pronouns he/him.    Initial consultation on 07/11/24.   Who referred you to care?: primary care provider  CARE TEAM:   PCP- Layo Gage  Therapist- Eber Thomas with [facility]              Chief Complaint   Lenard identified the reason for seeking services at this time as: \"Medication management\". Reported this as beginning approximately 07/10/24.              Assessment & Plan     History and interview support the following diagnoses:   ADHD, combined type    Lenard is a pleasant 28-year-old adult with psychiatric history of ADHD who presents for psychiatric evaluation.     Lenard notes that he was diagnosed with ADHD around first grade. Symptoms include fidgeting, difficulty remaining seated, distractibility, and overwhelm with regards to decision making. Symptoms interfere with functioning across multiple environments including home, work, and social interactions. Lenard has no history of anxiety, depression, psychosis, hypo/laurel, trauma, or substance use problems. He is relatively healthy and denies cardiac history or seizure history. His clinical picture does not involve safety concerns, including NSSI/SI/HI.     Lenard has been on Concerta for many years with good benefit and tolerability. He denies insomnia, appetite suppression, or agitation/anxiety. He denies history of misuse or abuse of stimulant medications. He is aware that he is at the maximum dose of Concerta. We discussed the dose dependent risk for cardiac side effects including but not limited to tachycardia, palpitations, hypertension, and sudden cardiac death. He " is aware that Concerta is a controlled medication and carries risk for abuse, misuse, and addiction. His blood pressures appear stable per available readings in chart. I recommend continued close monitoring of BP and pulse given he is at the max dose of Concerta. Guanfacine was added for ADHD more recently and he tolerates this medication without issue. He takes this medication in the morning and denies concerns for dizziness or fatigue.    Given Lenard's long-term stability with current ADHD regimen, no changes are warranted today. Lenard prefers to obtain refills through primary care versus long-term follow-up with psychiatry. He is aware that he can return to long-term psychiatry or be referred to [facility] for short-term psychiatric care should changes to regimen be desired or indicated in the future. I did consult with Dr. Gage and he is in agreement with this plan.     PSYCHOTROPIC DRUG INTERACTIONS: **Italicized interactions are for treatment plan options not currently implemented**  none    MANAGEMENT:  N/A    [place]: indicates taking controlled substances as prescribed                Plan    1) PSYCHOTROPIC MEDICATION RECOMMENDATIONS:  -Continue Concerta 72mg daily  -Continue guanfacine ER 2mg daily    2) THERAPY: Psychotherapy is a primary recommendation. Currently seeing Eber Thomas with [facility]    3) NEXT DUE:   Labs- Routine monitoring is not indicated for current psychotropic medication regimen   ECG- Routine monitoring is not indicated for current psychotropic medication regimen     4) REFERRALS / COORDINATION: None    5) DISPOSITION: Return to PCP for long-term medication management.     Treatment Risk Statement:  The patient understands the risks, benefits, adverse effects and alternatives. Agrees to treatment with the capacity to do so. No medical contraindications to treatment. Agrees to contact provider for any problems. The patient understands to call 911 or go to the nearest ED if  urgent or life threatening symptoms occur. Crisis contact numbers are provided routinely in the After Visit Summary.    Lenard did not appear to be an imminent safety risk to self or others.    PROVIDER:  TAMAR Greer CNP              Pertinent Background  Miguel Angel notes he received a diagnosis of ADHD around 1st grade. He was previously followed by Dr. Martinez with [facility] for 12 years until she closed her practice. Since then he has been to several psychiatric providers, most recently [facility]. He receives primary care services through [facility] and is looking to consolidate his care. He denies history of depression, anxiety, or trauma. Suicidal ideation has never been a part of patient's clinical picture.   Psych pertinent item history includes [none]              History of Present Illness     Patient attends today's visit alone. He is a good historian.     Most recently followed by [facility] for management of ADHD but looking to consolidate healthcare services into one organization. Prior to this, was followed by Dr. Martinez for 12 years with [facility]. Patient clarifies he has never been diagnosed/treated for eating disorder - he established care with Dr. Martinez prior to her employment with [facility] and patient was able to follow her there until her private practice out of [facility] closed.     Mood/anxiety  -Denies history of depression or anxiety.     Sleep  -Denies concerns.   -Getting about 7 hours of sleep per night. Energy is adequate.     ADHD  -Diagnosed around 1st grade.  -Symptoms: hyperactivity, impulsivity, fidgeting, trouble staying focused with reading, having a hard time making decisions, can lose interest in conversations and get side tracked.  -Symptoms tend to impact home life, work productivity, social interactions  -No traffic accidents.     Medications  -Has been on Concerta for many years (~15 years). Takes between 8a-10a.  -Has been on other stimulants in the past with  limited benefits or ASE.     Current psychiatric medications  -Guanfacine ER 2mg in the morning for ADHD   -Concerta 72mg daily    ASE: Denies   Medication adherence: Takes medications as prescribed    Recent Psych Symptoms:   Depression:  Denies  Elevated:  none  Psychosis:  none  Anxiety:  Denies  Trauma Related:  none  Insomnia: Denies concerns  Other:  No    Pertinent Negatives: No suicidal or violent ideation, psychosis, or hypo/laurel.      Pertinent Social Hx:  FINANCIAL SUPPORT- Works in [employer]. Currently job-seeking. 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   LIVING SITUATION / RELATIONSHIPS- Lives alone. Feels safe at home.    SOCIAL/ SPIRITUAL SUPPORT- Parents, brothers, several close friends.     Pertinent Substance Use  Alcohol- Minimal use; on an average week will have 1-3 drinks   Nicotine- He does vape  Caffeine- 1-2 cups of coffee most days  Opioids- None  Narcan Kit- N/A  THC/CBD- None  Other Illicit Drugs- none    Substance Use History  Past Use- Denies  Treatment [#, most recent]- None  Medical Consequences [withdrawal, sz etc]- None  Legal Consequences- None              Medical Review of Systems   Dizziness/orthostasis- Denies dizziness or falls  Headaches- Denies  Respiratory- Endorses hx of asthma  Cardiovascular- Denies history of HTN, chest pain, chest tightness, palpitations  Gastrointestinal- Denies  Sexual health concerns- None  A comprehensive review of systems was performed and is negative other than noted above.              Past Psychiatric History     Self injurious behavior [method, most recent]- None  Suicide attempt [#, most recent, method]- None  Suicidal ideation [passive, active]- None   History of violence/aggression/HI- None  What in the following list protects you from causing harm to yourself or others?: forward or future oriented thinking;dedication to family or friends;safe and stable  environment;regular sleep;effectively controls impulses;regular physical activity;sense of belonging;purpose, Are there firearms in your home?: are not    Psychosis hx- None  Psych hosp [ #, most recent, committed]- None  ECT/TMS [#, most recent]- None    Eating disorder hx- Denies  Trauma hx- Denies  Outpatient programs [Day treatment, DBT, eating disorder tx, etc]- Individual psychotherapy              Past Psychotropic Medications     Medication Max Dose (mg) Dates / Duration Helpful? DC Reason / Adverse Effects?   Concerta 72mg      Vyvanse    Appetite suppression    Ritalin (maybe)       Are you taking/ not taking prescription medications as they were prescribed?: taking              Social History                [per patient report]  Financial- What are your current financial sources?: employment, Does your finances cause stress?: does not  Employment- What is your employment status?: employed part time, Able to function?: yes, If you work in a paying job or as a volunteer, describe the job and how long you have held it: : A few different jobs Did you serve in the ?: did not  Living situation- What is your housing situation?: staying in own home/apartment  Feels safe at home- Yes  Household / family- Name: Mary, Age: 55, Relationship: Mom, Living in same house?: no, Name: Remy, Age: 62, Relationship: Dad, Living in same house?: no  Relationships- What is your current relationship status? : single, What is your sexual orientation?: heterosexual  Children- Do you have children?: no  Social/spiritual support- Who are the most supportive people in your life?  : parents;siblings;friends  Cultural- What is your cultural background? : , What are ethnic, cultural, or Worship influences that may be useful to know about you (for example history of experiencing discrimination, growing up rural/urban, valuing culturally specific treatments)?  : Suburban, What is your preferred language?  :  English  Education- What is your highest education? : college graduate  Early history- Where did you grow up?: [facility], Who took care of you as a child?: biological parents  Raised by- How would you describe your parent's relationships?: were always together  Siblings- Do you have siblings?: yes, How many full siblings do you have?: 2  Quality of family relationships- How would you describe your current family relationships?: stable and meaningful  Legal- Have you been involved with the legal system (child custody, order for protection, DWI, etc.)?: have not, Do you have a ?  : does not              Family History   Maternal uncle:  via suicide  Youngest sibling: ADHD  No known family history of psychosis or bipolar disorder.               Past Medical History     Medication allergies:    Allergies   Allergen Reactions    Seasonal Allergies        Neurologic Hx [head injury, seizures, etc.]: Denies hx of concussion or seizure  There is no problem list on file for this patient.    Past Medical History:   Diagnosis Date    ADHD     Asthma     Dyslipidemia     Obesity                  Medications   Current Outpatient Medications   Medication Sig Dispense Refill    albuterol (PROAIR HFA/PROVENTIL HFA/VENTOLIN HFA) 108 (90 Base) MCG/ACT inhaler Inhale 1-2 puffs into the lungs every 6 hours as needed for shortness of breath or wheezing 18 g 3    clindamycin (CLEOCIN-T) 1 % external gel Apply topically 2 times daily 100 mL 1    fluticasone (FLOVENT HFA) 110 MCG/ACT inhaler INHALE 2 PUFFS TWICE A DAY FOR TWO WEEKS AT ONSET OF URI. RINSE AFTER USE. 1 g 3    guanFACINE (INTUNIV) 2 MG TB24 24 hr tablet Take 1 tablet (2 mg) by mouth at bedtime 90 tablet 1    methylphenidate HCl ER, OSM, (CONCERTA) 36 MG CR tablet Take 2 tablets (72 mg) by mouth every morning 60 tablet 0    pregabalin (LYRICA) 50 MG capsule Take 1 capsule (50 mg) by mouth 2 times daily 60 capsule 0    sildenafil (REVATIO) 20 MG tablet  Take 1-2 tablets (20-40 mg) by mouth daily as needed (take 1 hour before intercourse as needed.) May increase up to 100mg dose ( 5 tablets) if needed.  Use lowest effective dose. 30 tablet 3                   Physical Exam  (Vitals Only)  There were no vitals taken for this visit.    Pulse Readings from Last 5 Encounters:   06/10/24 72   05/23/24 97   05/09/24 97   03/27/24 90   03/19/24 108     Wt Readings from Last 5 Encounters:   06/10/24 113.4 kg (249 lb 14.4 oz)   05/23/24 109.8 kg (242 lb)   05/09/24 109.9 kg (242 lb 4.8 oz)   04/11/24 102.1 kg (225 lb)   03/27/24 113.1 kg (249 lb 6.4 oz)     BP Readings from Last 5 Encounters:   06/10/24 130/83   05/23/24 125/82   05/09/24 127/87   03/27/24 136/86   03/19/24 136/82                 Mental Status Exam  Alertness: alert  and oriented  Appearance: adequately groomed  Behavior/Demeanor: cooperative, pleasant, and calm, with good eye contact   Speech: normal and regular rate and rhythm  Language: intact and no problems  Psychomotor: normal or unremarkable  Mood: description consistent with euthymia  Affect: full range and appropriate; was congruent to mood  Thought Process/Associations: unremarkable  Thought Content:  Reports none;  Denies suicidal ideation, violent ideation, and delusions  Perception:  Reports none;  Denies auditory hallucinations and visual hallucinations  Insight: intact  Judgment: intact  Cognition: does  appear grossly intact; formal cognitive testing was not done  oriented: time, person, and place  Gait and Station:  N/A ([facility])                Data       7/11/2024     1:59 PM   PROMIS-10 Total Score w/o Sub Scores   PROMIS TOTAL - SUBSCORES 36         7/11/2024     2:03 PM   CAGE-AID Total Score   Total Score 0   Total Score MyChart 0 (A total score of 2 or greater is considered clinically significant)         7/11/2024     1:53 PM   PHQ   PHQ-9 Total Score 0   Q9: Thoughts of better off dead/self-harm past 2 weeks Not at all          2024    12:09 PM   KERRY-7 SCORE   Total Score 0 (minimal anxiety)   Total Score 0         Liver/kidney function Metabolic Blood counts   Recent Labs   Lab Test 06/10/24  1012 24  1113   CR 1.10 1.08   AST 48* 43   ALT 80* 90*   ALKPHOS 84 87    Recent Labs   Lab Test 24  1048 10/04/22  0915   CHOL  --  194   TRIG  --  145   LDL  --  124*   HDL  --  41   TSH 1.51  --     Recent Labs   Lab Test 06/10/24  1012   WBC 6.1   HGB 15.9   HCT 46.5   MCV 86           ECG results from 24   EKG 12-lead complete w/read - Clinics     Value    Systolic Blood Pressure     Diastolic Blood Pressure     Ventricular Rate 87    Atrial Rate 87    MT Interval 124    QRS Duration 92        QTc 435    P Axis 21    R AXIS 29    T Axis 34    Interpretation ECG      Sinus rhythm  Normal ECG  When compared with ECG of 01-MAR-2007 15:55,  PREVIOUS ECG IS PRESENT  Unconfirmed report - this is a computer generated report only - please see the primary care center for final interpretation  Confirmed by - [facility], PHYSICIAN (),  JESSIE MARTINES ([id number]) on 2024 3:40:16 PM         Administrative Billin minutes spent on the date of the encounter doing chart review and reviewing referral documents, history and exam of patient, documentation and further activities as noted above.    Video/Phone Start Time: 2:07p   Video/Phone End Time: 2:47p

## 2024-07-11 NOTE — NURSING NOTE
Current patient location:  [facility]    Is the patient currently in the state of [place]? YES    Visit mode:VIDEO    If the visit is dropped, the patient can be reconnected by: VIDEO VISIT: Send to e-mail at: [email or web address]    Will anyone else be joining the visit? NO  (If patient encounters technical issues they should call [phone] :[phone])    How would you like to obtain your AVS? MyChart    Are changes needed to the allergy or medication list? No    Are refills needed on medications prescribed by this physician? NO    Reason for visit: Consult    Patient prefers to complete questionnaires in MyChart rather than over the phone. Pt stated they will complete them prior to joining the video.    Dayana ROGEL

## 2024-07-17 ENCOUNTER — MYC REFILL (OUTPATIENT)
Dept: INTERNAL MEDICINE | Facility: CLINIC | Age: 29
End: 2024-07-17
Payer: COMMERCIAL

## 2024-07-17 DIAGNOSIS — F90.9 ATTENTION DEFICIT HYPERACTIVITY DISORDER (ADHD), UNSPECIFIED ADHD TYPE: ICD-10-CM

## 2024-07-17 RX ORDER — METHYLPHENIDATE HYDROCHLORIDE 36 MG/1
72 TABLET ORAL EVERY MORNING
Qty: 60 TABLET | Refills: 0 | Status: SHIPPED | OUTPATIENT
Start: 2024-07-17 | End: 2024-08-21

## 2024-07-31 ENCOUNTER — MYC MEDICAL ADVICE (OUTPATIENT)
Dept: INTERNAL MEDICINE | Facility: CLINIC | Age: 29
End: 2024-07-31
Payer: COMMERCIAL

## 2024-07-31 DIAGNOSIS — Z11.3 SCREEN FOR STD (SEXUALLY TRANSMITTED DISEASE): Primary | ICD-10-CM

## 2024-08-21 ENCOUNTER — MYC REFILL (OUTPATIENT)
Dept: INTERNAL MEDICINE | Facility: CLINIC | Age: 29
End: 2024-08-21
Payer: COMMERCIAL

## 2024-08-21 DIAGNOSIS — F90.9 ATTENTION DEFICIT HYPERACTIVITY DISORDER (ADHD), UNSPECIFIED ADHD TYPE: ICD-10-CM

## 2024-08-21 RX ORDER — METHYLPHENIDATE HYDROCHLORIDE 36 MG/1
72 TABLET ORAL EVERY MORNING
Qty: 60 TABLET | Refills: 0 | Status: SHIPPED | OUTPATIENT
Start: 2024-08-21 | End: 2024-09-16

## 2024-08-21 RX ORDER — GUANFACINE 2 MG/1
2 TABLET, EXTENDED RELEASE ORAL AT BEDTIME
Qty: 90 TABLET | Refills: 1 | Status: SHIPPED | OUTPATIENT
Start: 2024-08-21

## 2024-08-22 ENCOUNTER — LAB (OUTPATIENT)
Dept: LAB | Facility: CLINIC | Age: 29
End: 2024-08-22
Payer: COMMERCIAL

## 2024-08-22 DIAGNOSIS — Z11.3 SCREEN FOR STD (SEXUALLY TRANSMITTED DISEASE): ICD-10-CM

## 2024-08-22 LAB — HIV 1+2 AB+HIV1 P24 AG SERPL QL IA: NONREACTIVE

## 2024-08-22 PROCEDURE — 87491 CHLMYD TRACH DNA AMP PROBE: CPT

## 2024-08-22 PROCEDURE — 36415 COLL VENOUS BLD VENIPUNCTURE: CPT

## 2024-08-22 PROCEDURE — 87389 HIV-1 AG W/HIV-1&-2 AB AG IA: CPT

## 2024-08-22 PROCEDURE — 87591 N.GONORRHOEAE DNA AMP PROB: CPT

## 2024-08-22 PROCEDURE — 86780 TREPONEMA PALLIDUM: CPT

## 2024-08-29 ENCOUNTER — OFFICE VISIT (OUTPATIENT)
Dept: DERMATOLOGY | Facility: CLINIC | Age: 29
End: 2024-08-29
Payer: COMMERCIAL

## 2024-08-29 DIAGNOSIS — L70.0 ACNE VULGARIS: Primary | ICD-10-CM

## 2024-08-29 DIAGNOSIS — L70.0 ACNE VULGARIS: ICD-10-CM

## 2024-08-29 PROCEDURE — 99207 PR NO CHARGE LOS: CPT | Performed by: DERMATOLOGY

## 2024-08-29 RX ORDER — TRETINOIN 0.25 MG/G
CREAM TOPICAL
Qty: 45 G | Refills: 3 | Status: SHIPPED | OUTPATIENT
Start: 2024-08-29 | End: 2024-09-05

## 2024-08-29 RX ORDER — AZELAIC ACID 0.15 G/G
GEL TOPICAL DAILY
Qty: 50 G | Refills: 3 | Status: SHIPPED | OUTPATIENT
Start: 2024-08-29

## 2024-08-29 RX ORDER — MINOCYCLINE HYDROCHLORIDE 100 MG/1
100 CAPSULE ORAL 2 TIMES DAILY
Qty: 60 CAPSULE | Refills: 2 | Status: SHIPPED | OUTPATIENT
Start: 2024-08-29

## 2024-08-29 ASSESSMENT — PAIN SCALES - GENERAL: PAINLEVEL: NO PAIN (0)

## 2024-08-29 NOTE — LETTER
8/29/2024       RE: Miguel Angel Hernandez  [address] [facility] [id number]     Dear Colleague,    Thank you for referring your patient, Miguel Angel Hernandez, to the [facility] [place] [facility]. Please see a copy of my visit note below.    [facility] Dermatology Note  Encounter Date: Aug 29, 2024  Office Visit     Dermatology Problem List:  #Acne vulgaris   -Current Tx: azelaic acid, tretinoin, BPO wash  ____________________________________________    Assessment & Plan:     #Acne vulgaris with possible overlap of HS, given presence in groin and axillae   - Reviewed diagnosis and overlap with HS   - Discussed treatment options including topicals, oral antibiotics, and oral isotretinoin   - Reviewed laboratory monitoring, iPledge system, side effects with Accutane including mood changes, GI side effects, dryness, arthralgias   - Discussed oral antibiotics including doxycyline, minocycline, and bactrim. Reviewed risk of drug reactions with bactrim.   - Discussed that Accutane could be helpful, however, if not seeing improvement with Accutane discussed that typical treatment for HS includes immunosuppressive medications such as humira.   - Patient elects at this time for topicals   - Continue BPO wash  -Start azelaic acid topically every morning  -Start tretinoin 0.25% cream nightly.  Reviewed side effects of retinoids including redness and irritation.  Discussed starting at 2-3 times per week use and increasing to nightly as tolerated.    # Benign nevus, left forearm  - Reassurance provided       Procedures Performed: None    Follow-up: 2 month(s) in-person, or earlier for new or changing lesions    Staff and Resident:  Patient seen and staffed with Dr. Guzmán.       Gema Ashby MD  Dermatology Resident PGY-2    I have seen and examined this patient and agree with the assessment and plan as documented in the  resident's note.    Lefty Guzmán MD  Dermatology Attending    ____________________________________________    CC: Acne (Lenard is here today for a mole on arm, acne on body and sun screening protection. )    HPI:  Mr. Miguel Angel Hernandez is a(n) 28 year old male who presents today as a new patient for acne. Patient reports that he has for years with acne, predominantly behind ears, hips, and inner thighs. He is currently using BPO wash, clindamycin, and Differin gel. He has also trialed doxycycline, but he didn't tolerate this well. He also wants for us to look at a mole on his arm that is newer.    Patient is otherwise feeling well, without additional skin concerns.    Labs Reviewed:  N/A    Physical Exam:  Vitals: There were no vitals taken for this visit.  SKIN: Focused examination of back of ears, armpits, trunk, and hips was performed.  - Hyperpigmented to erythematous papules and nodules to the back, bilateral hips, posterior auricular region   - Tan, homogenous papule to the left forearm   - Ice pick scars to the face   - Few erythematous pustules to the temples and forehead  - Few follicular based papules to the left arm  - No other lesions of concern on areas examined.     Medications:  Current Outpatient Medications   Medication Sig Dispense Refill     albuterol (PROAIR HFA/PROVENTIL HFA/VENTOLIN HFA) 108 (90 Base) MCG/ACT inhaler Inhale 1-2 puffs into the lungs every 6 hours as needed for shortness of breath or wheezing 18 g 3     clindamycin (CLEOCIN-T) 1 % external gel Apply topically 2 times daily 100 mL 1     fluticasone (FLOVENT HFA) 110 MCG/ACT inhaler INHALE 2 PUFFS TWICE A DAY FOR TWO WEEKS AT ONSET OF URI. RINSE AFTER USE. 1 g 3     guanFACINE (INTUNIV) 2 MG TB24 24 hr tablet Take 1 tablet (2 mg) by mouth at bedtime. 90 tablet 1     methylphenidate HCl ER, OSM, (CONCERTA) 36 MG CR tablet Take 2 tablets (72 mg) by mouth every morning. 60 tablet 0     pregabalin (LYRICA) 50 MG capsule Take 1 capsule  (50 mg) by mouth 2 times daily 60 capsule 0     sildenafil (REVATIO) 20 MG tablet Take 1-2 tablets (20-40 mg) by mouth daily as needed (take 1 hour before intercourse as needed.) May increase up to 100mg dose ( 5 tablets) if needed.  Use lowest effective dose. 30 tablet 3     No current facility-administered medications for this visit.      Past Medical History:   There is no problem list on file for this patient.    Past Medical History:   Diagnosis Date     ADHD      Asthma      Dyslipidemia      Obesity        CC Layo Gage MD  [address] [place] [address] on close of this encounter.      Again, thank you for allowing me to participate in the care of your patient.      Sincerely,    Lefty Guzmán MD

## 2024-08-29 NOTE — NURSING NOTE
Dermatology Rooming Note    Miguel Angel Hernandez's goals for this visit include:   Chief Complaint   Patient presents with    Acne     Lenard is here today for a mole on arm, acne on body and sun screening protection.      Jaiden CHAIDEZ, CMA

## 2024-08-29 NOTE — PROGRESS NOTES
[facility] Dermatology Note  Encounter Date: Aug 29, 2024  Office Visit     Dermatology Problem List:  #Acne vulgaris   -Current Tx: azelaic acid, tretinoin, BPO wash  ____________________________________________    Assessment & Plan:     #Acne vulgaris with possible overlap of HS, given presence in groin and axillae   - Reviewed diagnosis and overlap with HS   - Discussed treatment options including topicals, oral antibiotics, and oral isotretinoin   - Reviewed laboratory monitoring, iPledge system, side effects with Accutane including mood changes, GI side effects, dryness, arthralgias   - Discussed oral antibiotics including doxycyline, minocycline, and bactrim. Reviewed risk of drug reactions with bactrim.   - Discussed that Accutane could be helpful, however, if not seeing improvement with Accutane discussed that typical treatment for HS includes immunosuppressive medications such as humira.   - Patient elects at this time for topicals   - Continue BPO wash  -Start azelaic acid topically every morning  -Start tretinoin 0.25% cream nightly.  Reviewed side effects of retinoids including redness and irritation.  Discussed starting at 2-3 times per week use and increasing to nightly as tolerated.    # Benign nevus, left forearm  - Reassurance provided       Procedures Performed: None    Follow-up: 2 month(s) in-person, or earlier for new or changing lesions    Staff and Resident:  Patient seen and staffed with Dr. Guzmán.       Gema Ashby MD  Dermatology Resident PGY-2    I have seen and examined this patient and agree with the assessment and plan as documented in the resident's note.    Lefty Guzmán MD  Dermatology Attending    ____________________________________________    CC: Acne (Lenard is here today for a mole on arm, acne on body and sun screening protection. )    HPI:  Mr. Miguel Angel Hernandez is a(n) 28 year old male who presents today as a new patient for acne. Patient reports  that he has for years with acne, predominantly behind ears, hips, and inner thighs. He is currently using BPO wash, clindamycin, and Differin gel. He has also trialed doxycycline, but he didn't tolerate this well. He also wants for us to look at a mole on his arm that is newer.    Patient is otherwise feeling well, without additional skin concerns.    Labs Reviewed:  N/A    Physical Exam:  Vitals: There were no vitals taken for this visit.  SKIN: Focused examination of back of ears, armpits, trunk, and hips was performed.  - Hyperpigmented to erythematous papules and nodules to the back, bilateral hips, posterior auricular region   - Tan, homogenous papule to the left forearm   - Ice pick scars to the face   - Few erythematous pustules to the temples and forehead  - Few follicular based papules to the left arm  - No other lesions of concern on areas examined.     Medications:  Current Outpatient Medications   Medication Sig Dispense Refill    albuterol (PROAIR HFA/PROVENTIL HFA/VENTOLIN HFA) 108 (90 Base) MCG/ACT inhaler Inhale 1-2 puffs into the lungs every 6 hours as needed for shortness of breath or wheezing 18 g 3    clindamycin (CLEOCIN-T) 1 % external gel Apply topically 2 times daily 100 mL 1    fluticasone (FLOVENT HFA) 110 MCG/ACT inhaler INHALE 2 PUFFS TWICE A DAY FOR TWO WEEKS AT ONSET OF URI. RINSE AFTER USE. 1 g 3    guanFACINE (INTUNIV) 2 MG TB24 24 hr tablet Take 1 tablet (2 mg) by mouth at bedtime. 90 tablet 1    methylphenidate HCl ER, OSM, (CONCERTA) 36 MG CR tablet Take 2 tablets (72 mg) by mouth every morning. 60 tablet 0    pregabalin (LYRICA) 50 MG capsule Take 1 capsule (50 mg) by mouth 2 times daily 60 capsule 0    sildenafil (REVATIO) 20 MG tablet Take 1-2 tablets (20-40 mg) by mouth daily as needed (take 1 hour before intercourse as needed.) May increase up to 100mg dose ( 5 tablets) if needed.  Use lowest effective dose. 30 tablet 3     No current facility-administered medications for this  visit.      Past Medical History:   There is no problem list on file for this patient.    Past Medical History:   Diagnosis Date    ADHD     Asthma     Dyslipidemia     Obesity        CC Layo Gage MD  [address] [address] on close of this encounter.

## 2024-08-29 NOTE — PATIENT INSTRUCTIONS
Azeleic is in the morning. The tretinoin is in the evening. Take the oral minocycline with food and water. Stop taking this medication if you are experiencing headaches and take it with food and water. Please wear sunscreen while taking this as you are at increased risk of sun-reactions on this medication.

## 2024-09-05 RX ORDER — TRETINOIN 0.25 MG/G
CREAM TOPICAL
Qty: 45 G | Refills: 3 | Status: SHIPPED | OUTPATIENT
Start: 2024-09-05

## 2024-09-16 ENCOUNTER — MYC REFILL (OUTPATIENT)
Dept: INTERNAL MEDICINE | Facility: CLINIC | Age: 29
End: 2024-09-16
Payer: COMMERCIAL

## 2024-09-16 DIAGNOSIS — F90.9 ATTENTION DEFICIT HYPERACTIVITY DISORDER (ADHD), UNSPECIFIED ADHD TYPE: ICD-10-CM

## 2024-09-17 RX ORDER — METHYLPHENIDATE HYDROCHLORIDE 36 MG/1
72 TABLET ORAL EVERY MORNING
Qty: 60 TABLET | Refills: 0 | Status: SHIPPED | OUTPATIENT
Start: 2024-09-20

## 2024-09-18 ENCOUNTER — MYC MEDICAL ADVICE (OUTPATIENT)
Dept: INTERNAL MEDICINE | Facility: CLINIC | Age: 29
End: 2024-09-18
Payer: COMMERCIAL

## 2024-09-18 NOTE — TELEPHONE ENCOUNTER
[facility] Call Center    Phone Message    May a detailed message be left on voicemail: yes     Reason for Call: Other: Per pt is needing this medication release to him by today since he is traveling for work tomorrow @ 6 am and wont have enough tablets until his return. Per pt would like to know if this can be changed where is can be release to him before he travels every month for work? Like if he can get this medication release to him on time?? Per pt was told by the pharmacy that this being release is up to the provider. Per pt was also told by the pharmacy that the team can call them and approve the medication release. Per pt also have talk to his insurance about this and they told him that medication can be release to him in a faster time frame if the team follows his insurance rules for a medication release??  Please call pt back to discuss or to let him know the medication will be available for  alter today.       Action Taken: Message routed to:  [facility]    Travel Screening: Not Applicable     Date of Service:

## 2024-09-19 NOTE — TELEPHONE ENCOUNTER
"I don't disagree that he should get his medications in a timely manner    I'm sure insurance is strict on \"extra doses\" unless we change his prescription Currently #60/month. I guess we could try to keep the same dosing (which we have to) and put in for #64/month?    Thanks, GENE Gage  "

## 2024-09-22 PROBLEM — L70.0 ACNE VULGARIS: Status: ACTIVE | Noted: 2024-09-22

## 2024-10-07 ENCOUNTER — TELEPHONE (OUTPATIENT)
Dept: DERMATOLOGY | Facility: CLINIC | Age: 29
End: 2024-10-07
Payer: COMMERCIAL

## 2024-10-07 NOTE — TELEPHONE ENCOUNTER
Writer left message asking Lenard to call back to discuss a sooner appointment.    Patient is scheduled to see Dr. Guzmán on 10/24/24. Dr. Guzmán does not have any sooner appointments available.     DUY Arora

## 2024-10-08 NOTE — TELEPHONE ENCOUNTER
I reached out to Lenard to relay the information below in regards to there being no other opening before his already scheduled visit. Lenard was not comfortable with that answer and states that he has already waited a long time for the appointment be had in August and doesn't feel like his questions were answered and doesn't feel comfortable seeing Dr. Guzmán again. Lenard states that he needs another appointment in the next 2 weeks with another MD, I informed Lenard that I did not have an opening. Lenard began stating that if we can not find him an appointment in 2-4 weeks with a MD he is going to reach out his health care representative  because he is advocating for his health and if someone doesn't help him he going to escalate this issue past patient relations and speak with people higher up. I informed Lenard that I can not just add him to a providers schedule I have to get approve to do so since he already has an appointment coming up. Lenard would like a call back from Ashly Parham (supervisor ) if she is able to get him added on with a different provider. Lenard would like a call back before the end of this week. I asked Lenard if he would like me to cancel the appointment he has with Dr. Guzmán and he declined.

## 2024-10-10 NOTE — TELEPHONE ENCOUNTER
Writer talked to the patient and scheduled him to see Dr. Yobany Mitchell on 10/15/24 at 9:00 AM.    DUY Arora

## 2024-10-15 ENCOUNTER — OFFICE VISIT (OUTPATIENT)
Dept: DERMATOLOGY | Facility: CLINIC | Age: 29
End: 2024-10-15
Payer: COMMERCIAL

## 2024-10-15 ENCOUNTER — LAB (OUTPATIENT)
Dept: LAB | Facility: CLINIC | Age: 29
End: 2024-10-15
Attending: STUDENT IN AN ORGANIZED HEALTH CARE EDUCATION/TRAINING PROGRAM
Payer: COMMERCIAL

## 2024-10-15 DIAGNOSIS — B35.3 TINEA PEDIS OF RIGHT FOOT: ICD-10-CM

## 2024-10-15 DIAGNOSIS — L70.0 ACNE VULGARIS: ICD-10-CM

## 2024-10-15 DIAGNOSIS — L70.0 ACNE VULGARIS: Primary | ICD-10-CM

## 2024-10-15 LAB
ALBUMIN SERPL BCG-MCNC: 4.6 G/DL (ref 3.5–5.2)
ALP SERPL-CCNC: 90 U/L (ref 40–150)
ALT SERPL W P-5'-P-CCNC: 115 U/L (ref 0–70)
ANION GAP SERPL CALCULATED.3IONS-SCNC: 11 MMOL/L (ref 7–15)
AST SERPL W P-5'-P-CCNC: 63 U/L (ref 0–45)
BILIRUB SERPL-MCNC: 0.3 MG/DL
BUN SERPL-MCNC: 19.8 MG/DL (ref 6–20)
CALCIUM SERPL-MCNC: 9.4 MG/DL (ref 8.8–10.4)
CHLORIDE SERPL-SCNC: 106 MMOL/L (ref 98–107)
CHOLEST SERPL-MCNC: 219 MG/DL
CREAT SERPL-MCNC: 1.07 MG/DL (ref 0.67–1.17)
EGFRCR SERPLBLD CKD-EPI 2021: >90 ML/MIN/1.73M2
FASTING STATUS PATIENT QL REPORTED: NO
FASTING STATUS PATIENT QL REPORTED: NO
GLUCOSE SERPL-MCNC: 126 MG/DL (ref 70–99)
HCO3 SERPL-SCNC: 24 MMOL/L (ref 22–29)
HDLC SERPL-MCNC: 44 MG/DL
LDLC SERPL CALC-MCNC: 144 MG/DL
NONHDLC SERPL-MCNC: 175 MG/DL
POTASSIUM SERPL-SCNC: 4.1 MMOL/L (ref 3.4–5.3)
PROT SERPL-MCNC: 6.9 G/DL (ref 6.4–8.3)
SODIUM SERPL-SCNC: 141 MMOL/L (ref 135–145)
TRIGL SERPL-MCNC: 155 MG/DL

## 2024-10-15 PROCEDURE — 80053 COMPREHEN METABOLIC PANEL: CPT | Performed by: STUDENT IN AN ORGANIZED HEALTH CARE EDUCATION/TRAINING PROGRAM

## 2024-10-15 PROCEDURE — 82465 ASSAY BLD/SERUM CHOLESTEROL: CPT | Performed by: STUDENT IN AN ORGANIZED HEALTH CARE EDUCATION/TRAINING PROGRAM

## 2024-10-15 PROCEDURE — 99214 OFFICE O/P EST MOD 30 MIN: CPT | Mod: GC | Performed by: STUDENT IN AN ORGANIZED HEALTH CARE EDUCATION/TRAINING PROGRAM

## 2024-10-15 PROCEDURE — 99000 SPECIMEN HANDLING OFFICE-LAB: CPT | Performed by: PATHOLOGY

## 2024-10-15 PROCEDURE — 36415 COLL VENOUS BLD VENIPUNCTURE: CPT | Performed by: STUDENT IN AN ORGANIZED HEALTH CARE EDUCATION/TRAINING PROGRAM

## 2024-10-15 RX ORDER — ISOTRETINOIN 20 MG/1
CAPSULE ORAL
Qty: 30 CAPSULE | Refills: 0 | Status: SHIPPED | OUTPATIENT
Start: 2024-10-15

## 2024-10-15 RX ORDER — KETOCONAZOLE 20 MG/G
CREAM TOPICAL
Qty: 60 G | Refills: 3 | Status: SHIPPED | OUTPATIENT
Start: 2024-10-15

## 2024-10-15 ASSESSMENT — PAIN SCALES - GENERAL: PAINLEVEL: NO PAIN (0)

## 2024-10-15 NOTE — PATIENT INSTRUCTIONS
Instructions:   - take isotretinoin 20 mg daily with fatty foods (nut butters, avocado) to increase abosorption  - do not use any other prescription acne products (oral or topical), gentle skin care only   - sunscreen to the face every day (SPF 30 or higher)  - do not donate blood or share this medication with anyone else   - do not take antibiotics from the tetracycline family while on isotretinoin (ex: tetracycline, doxycycline, minocycline)     Isotretinoin Helpful Tips:  Accutane is the best known name brand for isotretinoin. It is actually no longer made under this Accutane name brand. Other name brands are available (i.e. Amnesteem, Sotret). Generic isotretinoin is available and is usually what will be prescribed for you. It is a very safe and effective medication, but there are side effects that you should be aware of.     - What you will notice on an everyday basis: Dry eyes, lips, and skin.    - Help for dry lips: Vaseline, Aquaphor healing ointment, Vaniply, Hydrocortisone ointment  - Help for dry skin, all the following brands have good moisturizers that won't clog pores: Vanicream, Cervave, La - Roche Posay  - Help for dry eyes: lubricating eye drops (Systane Ultra Lubricant Eye Drops), use preservative-free eye drops if using more than 3x/day. Let your doctor know if you have any vision changes!     Other possible side effects include:   - Redder face and increased sensitivity to the sun. If you are taking a trip and be in the sun a lot, then consider stopping your isotretinoin 2 days prior to leaving.  (ask your doctor).   - Muscle and joint pain. If you are going to be doing heavy physical activity or a contact sport, you are at higher risk for muscle breakdown. Please discuss this with your provider (i.e. football and hockey players). General exercise (shoveling snow, long runs, weightlifting) should be fine, but if you are more sore than usual after, you may need to adjust your dose.     Uncommon  but important side effects to be aware of:  - Lab abnormalities (this is why we check labs while you are taking this medication).  - Severe birth defects if you become pregnant while on this medication.  - Possible depressed mood/suicidal thoughts. Please be sure to bring up any changes in mood with your doctor.   - Possible association with inflammatory disease. Please let your doctor know if you are having bloody bowel movements.   - Possible severe headache with vision changes. This head would feel different from your usual headaches and would have vision changes at the same time. If this happens, stop taking isotretinoin and immediately go the nearest ED for evaluation.       If you forget your number or password, please call the iPLEDGE program: [phone]

## 2024-10-15 NOTE — PROGRESS NOTES
[facility] Dermatology Note  Encounter Date: Oct 15, 2024  Office Visit     Dermatology Problem List:  # Acne/folliculitis on chest and back with possible HS (Wiseman Stage I) in groin  - Current txt: isotretinoin 20 mg daily (started 10/15/24)  - CMP and lipids ordered 10/15/24  - Prior txt: BPO wash, clinda lotion, differin gel, doxycyline (didn't tolerate 2/2 GI upset), Rx'd minocycline but never took this  # Tinea pedis vs erythrasma   - ketoconazole cream BID  ____________________________________________    Assessment & Plan:     # Acne/folliculitis on chest and back with possible HS (Wiseman Stage I) in groin (Continuing focal point for medical care services related to serious/complex condition)  Discussed natural history of acne/folliculitis today. Reviewed treatment options including topical therapies vs oral tetracyclines vs. Isotretinoin. Patient not achieving adqeuate improvement with topicals ad reports history of GI intolerance with doxycyline. He was previously prescribed minocycline but never started this medication. Discussion of risks and possible side effects of isotretinoin including but not limited to mucocutaneous dryness, arthralgias, mylalgias, reviewed. Discussed need for monthly visits to obtain refills. The patient was counseled they cannot give blood while on isotretinoin. Ipledge program consent was obtained.  - Start isotretinoin 20 mg daily  - Baseline CMP an lipids obtained today    # Scaling in interdigital webspaces between 3rd and 4th toes  Ddx tinea vs erythrasma  - Start ketoconazole cream BID for at least 2 weeks, if not improving can switch to clindamycin lotion    # Therapeutic drug monitoring  - CMP, lipids and again in 2 mo     Procedures Performed: None    [facility]    Staff and Resident:    Matt BELLA MD, discussed and evaluated the patient with Dr. Mitchell.    ____________________________________________    CC:  "Derm Problem (Acne on body and uses BPO wash, redness on nose, dryness posterior sskin issues on feet. He states that he has been on doxycyline in the past.)    HPI:  Mr. Miguel Angel Hernandez is a(n) 29 year old male who presents today for acne/folliculitis. Endorses multiple acne lesions on the back, hips, buttocks and inguinal folds. Reports intermittent \"boils\" in groin that are painful and drain off and on which prevent him from leaving the house. He uses BPO wash and differin gel which help lesions in the groin but still has flares. He previously tried doxycyline but didn't tolerate this due to GI upset.     Labs Reviewed:  N/A    Physical Exam:  Vitals: There were no vitals taken for this visit.  SKIN: Focused examination of back, chest, face, armpits, ingunial folds was performed.  - Acneiform papules, pustules and hyperpigmented macules on back, axillae, buttocks, inguinal folds   - Ice pick scarring on face   - No other lesions of concern on areas examined.     Medications:  Current Outpatient Medications   Medication Sig Dispense Refill    albuterol (PROAIR HFA/PROVENTIL HFA/VENTOLIN HFA) 108 (90 Base) MCG/ACT inhaler Inhale 1-2 puffs into the lungs every 6 hours as needed for shortness of breath or wheezing 18 g 3    clindamycin (CLEOCIN-T) 1 % external gel Apply topically 2 times daily 100 mL 1    fluticasone (FLOVENT HFA) 110 MCG/ACT inhaler INHALE 2 PUFFS TWICE A DAY FOR TWO WEEKS AT ONSET OF URI. RINSE AFTER USE. 1 g 3    guanFACINE (INTUNIV) 2 MG TB24 24 hr tablet Take 1 tablet (2 mg) by mouth at bedtime. 90 tablet 1    methylphenidate HCl ER, OSM, (CONCERTA) 36 MG CR tablet Take 2 tablets (72 mg) by mouth every morning. 60 tablet 0    pregabalin (LYRICA) 50 MG capsule Take 1 capsule (50 mg) by mouth 2 times daily 60 capsule 0    sildenafil (REVATIO) 20 MG tablet Take 1-2 tablets (20-40 mg) by mouth daily as needed (take 1 hour before intercourse as needed.) May increase up to 100mg dose ( 5 tablets) if " needed.  Use lowest effective dose. 30 tablet 3    azelaic acid (FINACIA) 15 % external gel Apply topically daily. (Patient not taking: Reported on 10/15/2024) 50 g 3    minocycline (MINOCIN) 100 MG capsule Take 1 capsule (100 mg) by mouth 2 times daily. (Patient not taking: Reported on 10/15/2024) 60 capsule 2    tretinoin (RETIN-A) 0.025 % external cream USE EVERY NIGHT (Patient not taking: Reported on 10/15/2024) 45 g 3     No current facility-administered medications for this visit.      Past Medical History:   Patient Active Problem List   Diagnosis    Acne vulgaris     Past Medical History:   Diagnosis Date    ADHD     Asthma     Dyslipidemia     Obesity        CC Layo Gage MD  [address] [address] on close of this encounter.

## 2024-10-15 NOTE — NURSING NOTE
Dermatology Rooming Note    Miguel Angel Hernandez's goals for this visit include:   Chief Complaint   Patient presents with    Derm Problem     Acne on body and uses BPO wash, redness on nose, dryness posterior ears and skin issues on feet. He states that he has been on doxycyline in the past.       Jaiden CHAIDEZ CMA

## 2024-10-15 NOTE — LETTER
10/15/2024       RE: Miguel Angel Hernandez  [address] [facility] [id number]     Dear Colleague,    Thank you for referring your patient, Miguel Angel Hernandez, to the [facility] DERMATOLOGY [facility] [place] at [facility]. Please see a copy of my visit note below.    [facility] Dermatology Note  Encounter Date: Oct 15, 2024  Office Visit     Dermatology Problem List:  # Acne/folliculitis on chest and back with possible HS (Wiseman Stage I) in groin  - Current txt: isotretinoin 20 mg daily (started 10/15/24)  - CMP and lipids ordered 10/15/24  - Prior txt: BPO wash, clinda lotion, differin gel, doxycyline (didn't tolerate 2/2 GI upset), Rx'd minocycline but never took this  # Tinea pedis vs erythrasma   - ketoconazole cream BID  ____________________________________________    Assessment & Plan:     # Acne/folliculitis on chest and back with possible HS (Wiseman Stage I) in groin (Continuing focal point for medical care services related to serious/complex condition)  Discussed natural history of acne/folliculitis today. Reviewed treatment options including topical therapies vs oral tetracyclines vs. Isotretinoin. Patient not achieving adqeuate improvement with topicals ad reports history of GI intolerance with doxycyline. He was previously prescribed minocycline but never started this medication. Discussion of risks and possible side effects of isotretinoin including but not limited to mucocutaneous dryness, arthralgias, mylalgias, reviewed. Discussed need for monthly visits to obtain refills. The patient was counseled they cannot give blood while on isotretinoin. Ipledge program consent was obtained.  - Start isotretinoin 20 mg daily  - Baseline CMP an lipids obtained today    # Scaling in interdigital webspaces between 3rd and 4th toes  Ddx tinea vs erythrasma  - Start ketoconazole cream BID for at least 2 weeks, if not improving can switch to  "clindamycin lotion    # Therapeutic drug monitoring  - CMP, lipids and again in 2 mo     Procedures Performed: None    [facility]    Staff and Resident:    Matt BELLA MD, discussed and evaluated the patient with Dr. Mitchell.    ____________________________________________    CC: Derm Problem (Acne on body and uses BPO wash, redness on nose, dryness posterior sskin issues on feet. He states that he has been on doxycyline in the past.)    HPI:  Mr. Miguel Angel Hernandez is a(n) 29 year old male who presents today for acne/folliculitis. Endorses multiple acne lesions on the back, hips, buttocks and inguinal folds. Reports intermittent \"boils\" in groin that are painful and drain off and on which prevent him from leaving the house. He uses BPO wash and differin gel which help lesions in the groin but still has flares. He previously tried doxycyline but didn't tolerate this due to GI upset.     Labs Reviewed:  N/A    Physical Exam:  Vitals: There were no vitals taken for this visit.  SKIN: Focused examination of back, chest, face, armpits, ingunial folds was performed.  - Acneiform papules, pustules and hyperpigmented macules on back, axillae, buttocks, inguinal folds   - Ice pick scarring on face   - No other lesions of concern on areas examined.     Medications:  Current Outpatient Medications   Medication Sig Dispense Refill     albuterol (PROAIR HFA/PROVENTIL HFA/VENTOLIN HFA) 108 (90 Base) MCG/ACT inhaler Inhale 1-2 puffs into the lungs every 6 hours as needed for shortness of breath or wheezing 18 g 3     clindamycin (CLEOCIN-T) 1 % external gel Apply topically 2 times daily 100 mL 1     fluticasone (FLOVENT HFA) 110 MCG/ACT inhaler INHALE 2 PUFFS TWICE A DAY FOR TWO WEEKS AT ONSET OF URI. RINSE AFTER USE. 1 g 3     guanFACINE (INTUNIV) 2 MG TB24 24 hr tablet Take 1 tablet (2 mg) by mouth at bedtime. 90 tablet 1     methylphenidate HCl ER, OSM, (CONCERTA) 36 MG CR tablet Take 2 " tablets (72 mg) by mouth every morning. 60 tablet 0     pregabalin (LYRICA) 50 MG capsule Take 1 capsule (50 mg) by mouth 2 times daily 60 capsule 0     sildenafil (REVATIO) 20 MG tablet Take 1-2 tablets (20-40 mg) by mouth daily as needed (take 1 hour before intercourse as needed.) May increase up to 100mg dose ( 5 tablets) if needed.  Use lowest effective dose. 30 tablet 3     azelaic acid (FINACIA) 15 % external gel Apply topically daily. (Patient not taking: Reported on 10/15/2024) 50 g 3     minocycline (MINOCIN) 100 MG capsule Take 1 capsule (100 mg) by mouth 2 times daily. (Patient not taking: Reported on 10/15/2024) 60 capsule 2     tretinoin (RETIN-A) 0.025 % external cream USE EVERY NIGHT (Patient not taking: Reported on 10/15/2024) 45 g 3     No current facility-administered medications for this visit.      Past Medical History:   Patient Active Problem List   Diagnosis     Acne vulgaris     Past Medical History:   Diagnosis Date     ADHD      Asthma      Dyslipidemia      Obesity        CC Layo Gage MD  [address] [place] [address] on close of this encounter.      Attestation with edits by Yobany Mitchell MD at 10/15/2024 10:49 AM:  I have personally examined this patient and agree with the resident doctor's documentation and plan of care. I have reviewed and amended the resident's note. The documentation accurately reflects my clinical observations, diagnoses, treatment and follow-up plans.     Yobany Mitchell MD  Dermatology Staff      Again, thank you for allowing me to participate in the care of your patient.      Sincerely,    Yobany Mitchell MD

## 2024-10-21 ENCOUNTER — MYC REFILL (OUTPATIENT)
Dept: INTERNAL MEDICINE | Facility: CLINIC | Age: 29
End: 2024-10-21
Payer: COMMERCIAL

## 2024-10-21 DIAGNOSIS — F90.9 ATTENTION DEFICIT HYPERACTIVITY DISORDER (ADHD), UNSPECIFIED ADHD TYPE: ICD-10-CM

## 2024-10-22 RX ORDER — METHYLPHENIDATE HYDROCHLORIDE 36 MG/1
72 TABLET ORAL EVERY MORNING
Qty: 60 TABLET | Refills: 0 | Status: SHIPPED | OUTPATIENT
Start: 2024-10-22

## 2024-10-29 NOTE — PROGRESS NOTES
HPI;    He states a few weeks of R ear pain and fullness. He did take a Z-pack and overall feels better. Less pain. He has had several episode of hemoptysis with cough, URI's and at high altitude He is a snow board instructor. He has mild asthma. He does not smoke cigarettes. No SOB, no chest pain. He has had hemoptysis for about one year. Possibly increased at higher altitude. No other HEENT, cardiopulmonary, abdominal, , neurological, systemic, psychiatric, lymphatic, endocrine, vascular complaints.      Past Medical History:   Diagnosis Date    ADHD     Asthma     Dyslipidemia     Obesity      Past Surgical History:   Procedure Laterality Date    TONSILLECTOMY Bilateral     childhood     PE:    Vitals noted, gen, nad, cooperative, alert neck supple nl rom, R ear with some erythema, no drainage, He localizes the tuning fork to R ear,  lungs with good air movement and clear, RRR, S1, S2, no MRG, abdomen, no acute findings. Grossly normal neurological exam.     Recent Results (from the past 720 hours)   Comprehensive metabolic panel    Collection Time: 10/15/24 10:49 AM   Result Value Ref Range    Sodium 141 135 - 145 mmol/L    Potassium 4.1 3.4 - 5.3 mmol/L    Carbon Dioxide (CO2) 24 22 - 29 mmol/L    Anion Gap 11 7 - 15 mmol/L    Urea Nitrogen 19.8 6.0 - 20.0 mg/dL    Creatinine 1.07 0.67 - 1.17 mg/dL    GFR Estimate >90 >60 mL/min/1.73m2    Calcium 9.4 8.8 - 10.4 mg/dL    Chloride 106 98 - 107 mmol/L    Glucose 126 (H) 70 - 99 mg/dL    Alkaline Phosphatase 90 40 - 150 U/L    AST 63 (H) 0 - 45 U/L     (H) 0 - 70 U/L    Protein Total 6.9 6.4 - 8.3 g/dL    Albumin 4.6 3.5 - 5.2 g/dL    Bilirubin Total 0.3 <=1.2 mg/dL    Patient Fasting > 8hrs? No    Lipid panel reflex to direct LDL Fasting    Collection Time: 10/15/24 10:49 AM   Result Value Ref Range    Cholesterol 219 (H) <200 mg/dL    Triglycerides 155 (H) <150 mg/dL    Direct Measure HDL 44 >=40 mg/dL    LDL Cholesterol Calculated 144 (H) <100 mg/dL     Non HDL Cholesterol 175 (H) <130 mg/dL    Patient Fasting > 8hrs? No            A/P:    1. New DVT L LE and he now off  Xarelto (he completed 3 months of treatment) and he saw Kailash Montaño, Hematology 4/29/2024. Recommend prevention anti-coagulation before travel   2. ADHD on Methylphenidate. He had a psychiatry appt 7/11/2024.    3. Reactive airway disease. He uses Flovent and Albuterol MDI's  4. Elevated ALT (90 on 1/31/2024). He has a h/o hepatic steatosis (last abdominal U/S 8/1/2020). He had a Hepatology appt. With Dr. Cotto 6/10/2024.  and AST 63 on 10/15/2024.   5. STD testing 8/22/2024.   6. Dermatology; he had a 10/15/2024 appt.. Next appt. 11/12/2024 with Dr. Mitchell.   7. R knee pain; X-ray 2/7/2024. He had an Sports Medicine appt. 3/19/2024.   8. Pelvic pain; see imaging from 11/4/2022, 10/4/2022, and 9/8/2022. He was seen 4/11/2024 by Dr. De Oliveira for long-standing scrotal pain. He had a 5/23/2024 Pain Clinic appt. With Dr. Marinleli. He had a testicular U/S done 3/20/2024 that showed varicocele and he may consider surgery  9. Immunizations; Td/Tdap 7/13/2015  10. Lipids 10/15/2024; , HDL 44 and TG's 155  11. R ear eustachian tube dysfunction. He is using flonase and has taken a Z-pack and is doing better. Sent in another RX for Z-pack. If worse he will need to see ENT.   12. Hemoptysis, bronchial? Ordered CXR and PFT's. He does a fair amount of higher altitude snow boarding (10,000 ft). Will get contrast Chest CT and pulmonary referral   13. A1c to evaluate for increased glucose.         40 minutes spent on the date of the encounter doing chart review, history and exam, documentation and further activities as noted above exclusive of procedures and other billable interpretations   81 y/o AA M  with history of obstructive sleep apnoea on CPAP, essential HTN, CAD, severely impaired EF% with 12% in 2019, severe mitral regurgitation, past MI with PCI and LAD stent, PAD with past stents in , history of DVT on Xarelto, chronic kidney disease stage 3-4 with recent admission on 19 for decompensated HF.  Patient with mitral clip x 2 on 19, with AICD placement  now admitted with decompensated chf, sob and ASYA on ckd with hx of gout       1-  Ckd  III with ASYA  2-  chf  3- hx gout  4- hyperlipidemia           cr steady at this level   chf compensated  cont with torsemide 80 mg bid   to cont  5 mcg/kg/min   aldactone to 50 mg daily   cont allopurinol 100 mg qd   Feso4- tabs   strict I/O  cont lipitor   to follow up outpt

## 2024-10-30 ENCOUNTER — ANCILLARY PROCEDURE (OUTPATIENT)
Dept: GENERAL RADIOLOGY | Facility: CLINIC | Age: 29
End: 2024-10-30
Attending: INTERNAL MEDICINE
Payer: COMMERCIAL

## 2024-10-30 ENCOUNTER — OFFICE VISIT (OUTPATIENT)
Dept: INTERNAL MEDICINE | Facility: CLINIC | Age: 29
End: 2024-10-30
Payer: COMMERCIAL

## 2024-10-30 ENCOUNTER — MYC MEDICAL ADVICE (OUTPATIENT)
Dept: INTERNAL MEDICINE | Facility: CLINIC | Age: 29
End: 2024-10-30

## 2024-10-30 ENCOUNTER — LAB (OUTPATIENT)
Dept: LAB | Facility: CLINIC | Age: 29
End: 2024-10-30
Payer: COMMERCIAL

## 2024-10-30 VITALS
DIASTOLIC BLOOD PRESSURE: 87 MMHG | SYSTOLIC BLOOD PRESSURE: 139 MMHG | BODY MASS INDEX: 39.06 KG/M2 | WEIGHT: 264.5 LBS | HEART RATE: 100 BPM | OXYGEN SATURATION: 96 %

## 2024-10-30 DIAGNOSIS — R05.9 COUGH, UNSPECIFIED TYPE: ICD-10-CM

## 2024-10-30 DIAGNOSIS — R05.9 COUGH, UNSPECIFIED TYPE: Primary | ICD-10-CM

## 2024-10-30 DIAGNOSIS — R73.09 INCREASED GLUCOSE LEVEL: ICD-10-CM

## 2024-10-30 DIAGNOSIS — H92.09 OTALGIA, UNSPECIFIED LATERALITY: Primary | ICD-10-CM

## 2024-10-30 LAB
EST. AVERAGE GLUCOSE BLD GHB EST-MCNC: 123 MG/DL
HBA1C MFR BLD: 5.9 %

## 2024-10-30 PROCEDURE — 83036 HEMOGLOBIN GLYCOSYLATED A1C: CPT | Performed by: INTERNAL MEDICINE

## 2024-10-30 PROCEDURE — 99000 SPECIMEN HANDLING OFFICE-LAB: CPT | Performed by: PATHOLOGY

## 2024-10-30 PROCEDURE — 36415 COLL VENOUS BLD VENIPUNCTURE: CPT | Performed by: PATHOLOGY

## 2024-10-30 PROCEDURE — 99215 OFFICE O/P EST HI 40 MIN: CPT | Performed by: INTERNAL MEDICINE

## 2024-10-30 PROCEDURE — 71046 X-RAY EXAM CHEST 2 VIEWS: CPT | Performed by: RADIOLOGY

## 2024-10-30 RX ORDER — AZITHROMYCIN 250 MG/1
TABLET, FILM COATED ORAL
Qty: 6 TABLET | Refills: 2 | Status: SHIPPED | OUTPATIENT
Start: 2024-10-30 | End: 2024-11-04

## 2024-10-30 ASSESSMENT — ASTHMA QUESTIONNAIRES
QUESTION_2 LAST FOUR WEEKS HOW OFTEN HAVE YOU HAD SHORTNESS OF BREATH: NOT AT ALL
QUESTION_1 LAST FOUR WEEKS HOW MUCH OF THE TIME DID YOUR ASTHMA KEEP YOU FROM GETTING AS MUCH DONE AT WORK, SCHOOL OR AT HOME: NONE OF THE TIME
QUESTION_3 LAST FOUR WEEKS HOW OFTEN DID YOUR ASTHMA SYMPTOMS (WHEEZING, COUGHING, SHORTNESS OF BREATH, CHEST TIGHTNESS OR PAIN) WAKE YOU UP AT NIGHT OR EARLIER THAN USUAL IN THE MORNING: NOT AT ALL
QUESTION_5 LAST FOUR WEEKS HOW WOULD YOU RATE YOUR ASTHMA CONTROL: COMPLETELY CONTROLLED
ACT_TOTALSCORE: 23
QUESTION_4 LAST FOUR WEEKS HOW OFTEN HAVE YOU USED YOUR RESCUE INHALER OR NEBULIZER MEDICATION (SUCH AS ALBUTEROL): TWO OR THREE TIMES PER WEEK
ACT_TOTALSCORE: 23

## 2024-10-30 NOTE — PROGRESS NOTES
Lenard is a 29 year old that presents in clinic today for the following:     Chief Complaint   Patient presents with    Follow Up     Following up on previous concerns  New symptoms: 1. He has found blood on his cough twice in the past year. 2. He was sick last week and his ears feel clogged.             10/30/2024     7:16 AM   Additional Questions   Roomed by Mehul VITALE     Screenings as of today     ACT TOTAL SCORE (Goal Greater than or Equal to 20)  23        VICENTE Anne at 7:18 AM on 10/30/2024

## 2024-11-06 ENCOUNTER — OFFICE VISIT (OUTPATIENT)
Dept: PULMONOLOGY | Facility: CLINIC | Age: 29
End: 2024-11-06
Attending: INTERNAL MEDICINE
Payer: COMMERCIAL

## 2024-11-06 DIAGNOSIS — R05.9 COUGH, UNSPECIFIED TYPE: ICD-10-CM

## 2024-11-06 LAB
DLCOUNC-%PRED-PRE: 115 %
DLCOUNC-PRE: 37.2 ML/MIN/MMHG
DLCOUNC-PRED: 32.33 ML/MIN/MMHG
ERV-%PRED-PRE: 37 %
ERV-PRE: 0.66 L
ERV-PRED: 1.77 L
EXPTIME-PRE: 7.01 SEC
FEF2575-%PRED-POST: 90 %
FEF2575-%PRED-PRE: 60 %
FEF2575-POST: 3.9 L/SEC
FEF2575-PRE: 2.6 L/SEC
FEF2575-PRED: 4.33 L/SEC
FEFMAX-%PRED-PRE: 97 %
FEFMAX-PRE: 9.82 L/SEC
FEFMAX-PRED: 10.04 L/SEC
FEV1-%PRED-PRE: 84 %
FEV1-PRE: 3.49 L
FEV1FEV6-PRE: 76 %
FEV1FEV6-PRED: 83 %
FEV1FVC-PRE: 76 %
FEV1FVC-PRED: 84 %
FEV1SVC-PRE: 74 %
FEV1SVC-PRED: 78 %
FIFMAX-PRE: 6.7 L/SEC
FRCPLETH-%PRED-PRE: 82 %
FRCPLETH-PRE: 2.69 L
FRCPLETH-PRED: 3.27 L
FVC-%PRED-PRE: 94 %
FVC-PRE: 4.6 L
FVC-PRED: 4.89 L
IC-%PRED-PRE: 114 %
IC-PRE: 4.03 L
IC-PRED: 3.53 L
RVPLETH-%PRED-PRE: 120 %
RVPLETH-PRE: 2.03 L
RVPLETH-PRED: 1.69 L
TLCPLETH-%PRED-PRE: 96 %
TLCPLETH-PRE: 6.72 L
TLCPLETH-PRED: 6.94 L
VA-%PRED-PRE: 95 %
VA-PRE: 6.12 L
VC-%PRED-PRE: 88 %
VC-PRE: 4.69 L
VC-PRED: 5.28 L

## 2024-11-06 PROCEDURE — 94726 PLETHYSMOGRAPHY LUNG VOLUMES: CPT | Performed by: INTERNAL MEDICINE

## 2024-11-06 PROCEDURE — 94729 DIFFUSING CAPACITY: CPT | Performed by: INTERNAL MEDICINE

## 2024-11-06 PROCEDURE — 94060 EVALUATION OF WHEEZING: CPT | Performed by: INTERNAL MEDICINE

## 2024-11-06 NOTE — PROGRESS NOTES
Miguel Angel Hernandez comes into clinic today at the request of Dr. Layo Gage Ordering Provider for PFT      Wayne Padron, RRT

## 2024-11-12 ENCOUNTER — VIRTUAL VISIT (OUTPATIENT)
Dept: DERMATOLOGY | Facility: CLINIC | Age: 29
End: 2024-11-12
Attending: STUDENT IN AN ORGANIZED HEALTH CARE EDUCATION/TRAINING PROGRAM
Payer: COMMERCIAL

## 2024-11-12 DIAGNOSIS — L70.0 ACNE VULGARIS: ICD-10-CM

## 2024-11-12 DIAGNOSIS — Z51.81 THERAPEUTIC DRUG MONITORING: Primary | ICD-10-CM

## 2024-11-12 NOTE — PROGRESS NOTES
[facility] Dermatology Note  Encounter Date: Nov 12, 2024  Store-and-Forward and Telephone ([phone] ). Location of teledermatologist: [facility] [place].  Start time: 2:01. End time: 2:10.        [facility] Dermatology Note    Encounter Date: Nov 12, 2024    Dermatology Problem List:  #AV  - 20  - Cumulative dose 600mg (11/12/24)    Major PMHx  #DVT  ______________________________________    Impression/Plan:  Lenard was seen today for derm problem.    Diagnoses and all orders for this visit:    Therapeutic drug monitoring  -     Comprehensive metabolic panel; Future  -     Lipid Profile; Future    Acne vulgaris (Continuing focal point for medical care services related to serious/complex condition)    -Doing well on 20 mg, photos that are mainly of postinflammatory pigment change  - Is noticing a little bit of dryness but it is very tolerable, he is a little bit of anticipatory anxiety about the dryness getting worse  - Has a history of metabolic associated fatty liver disease for which she follows with gastroenterology, he has had longstanding elevations in his liver enzymes  - Will recheck labs prior to going up on the dose and if everything looks okay proceed with a dose increase to 40 mg    Other orders  -     Adult Dermatology Clinic Follow-Up Order (Blank)        Follow-up in 1 mo.       Staff Involved:  Staff Only    Yobany Mitchell MD   of Dermatology  Department of Dermatology  [facility]      CC:   Chief Complaint   Patient presents with    Derm Problem     Acne recheck       History of Present Illness:  Mr. Miguel Angel Hernandez is a 29 year old male who presents as a return patient.    Has been taking isotretinoin for about 3 weeks. Doing well. Is concerned that it is prolonging sensation of ear being clogged after viral illness.     Has questions about dry lips     Labs:      Physical  exam:  Vitals: There were no vitals taken for this visit.  GEN: well developed, well-nourished, in no acute distress, in a pleasant mood.     SKIN: Frank phototype 3  - Telemedicine photographs reviewed (Date of images: 11/12/24. Image quality and interpretability: acceptable. Location of teledermatologist: [facility] [place].   - pigment alteration at sites of previous inflammation  - pink papules and pustules on groin and back  - No other lesions of concern on areas examined.     Past Medical History:   Past Medical History:   Diagnosis Date    ADHD     Asthma     Dyslipidemia     Obesity      Past Surgical History:   Procedure Laterality Date    TONSILLECTOMY Bilateral     childhood       Social History:   reports that he has quit smoking. His smoking use included other. He has never used smokeless tobacco. He reports current alcohol use of about 2.0 standard drinks of alcohol per week. He reports that he does not currently use drugs.    Family History:  Family History   Problem Relation Age of Onset    Snoring Mother     Hypertension Mother     Diabetes Maternal Grandmother     Liver Disease No family hx of     Colon Cancer No family hx of        Medications:  Current Outpatient Medications   Medication Sig Dispense Refill    albuterol (PROAIR HFA/PROVENTIL HFA/VENTOLIN HFA) 108 (90 Base) MCG/ACT inhaler Inhale 1-2 puffs into the lungs every 6 hours as needed for shortness of breath or wheezing 18 g 3    azelaic acid (FINACIA) 15 % external gel Apply topically daily. 50 g 3    clindamycin (CLEOCIN-T) 1 % external gel Apply topically 2 times daily 100 mL 1    fluticasone (FLOVENT HFA) 110 MCG/ACT inhaler INHALE 2 PUFFS TWICE A DAY FOR TWO WEEKS AT ONSET OF URI. RINSE AFTER USE. 1 g 3    guanFACINE (INTUNIV) 2 MG TB24 24 hr tablet Take 1 tablet (2 mg) by mouth at bedtime. 90 tablet 1    ISOtretinoin (ACCUTANE) 20 MG capsule Take 20 mg daily with fatty meal 30  capsule 0    ketoconazole (NIZORAL) 2 % external cream Apply to scaly areas between toes twice daily for at least 2 weeks 60 g 3    methylphenidate HCl ER, OSM, (CONCERTA) 36 MG CR tablet Take 2 tablets (72 mg) by mouth every morning. 60 tablet 0    pregabalin (LYRICA) 50 MG capsule Take 1 capsule (50 mg) by mouth 2 times daily 60 capsule 0    sildenafil (REVATIO) 20 MG tablet Take 1-2 tablets (20-40 mg) by mouth daily as needed (take 1 hour before intercourse as needed.) May increase up to 100mg dose ( 5 tablets) if needed.  Use lowest effective dose. 30 tablet 3    tretinoin (RETIN-A) 0.025 % external cream USE EVERY NIGHT (Patient not taking: Reported on 10/15/2024) 45 g 3     Allergies   Allergen Reactions    Seasonal Allergies

## 2024-11-12 NOTE — LETTER
11/12/2024       RE: Miguel Angel Hernandez  [address] [id number]     Dear Colleague,    Thank you for referring your patient, Miguel Angel Hernandez, to the [facility] [place] at [facility]. Please see a copy of my visit note below.    [facility] Dermatology Note  Encounter Date: Nov 12, 2024  Store-and-Forward and Telephone ([phone] ). Location of teledermatologist: [facility] [place].  Start time: 2:01. End time: 2:10.        [facility] Dermatology Note    Encounter Date: Nov 12, 2024    Dermatology Problem List:  #AV  - 20  - Cumulative dose 600mg (11/12/24)    Major PMHx  #DVT  ______________________________________    Impression/Plan:  Lenard was seen today for derm problem.    Diagnoses and all orders for this visit:    Therapeutic drug monitoring  -     Comprehensive metabolic panel; Future  -     Lipid Profile; Future    Acne vulgaris (Continuing focal point for medical care services related to serious/complex condition)    -Doing well on 20 mg, photos that are mainly of postinflammatory pigment change  - Is noticing a little bit of dryness but it is very tolerable, he is a little bit of anticipatory anxiety about the dryness getting worse  - Has a history of metabolic associated fatty liver disease for which she follows with gastroenterology, he has had longstanding elevations in his liver enzymes  - Will recheck labs prior to going up on the dose and if everything looks okay proceed with a dose increase to 40 mg    Other orders  -     Adult Dermatology Clinic Follow-Up Order (Blank)        Follow-up in 1 mo.       Staff Involved:  Staff Only    Yobany Mitchell MD   of Dermatology  Department of Dermatology  [facility]      CC:   Chief Complaint   Patient presents with     Derm Problem     Acne  recheck       History of Present Illness:  Mr. Miguel Angel Hernandez is a 29 year old male who presents as a return patient.    Has been taking isotretinoin for about 3 weeks. Doing well. Is concerned that it is prolonging sensation of ear being clogged after viral illness.     Has questions about dry lips     Labs:      Physical exam:  Vitals: There were no vitals taken for this visit.  GEN: well developed, well-nourished, in no acute distress, in a pleasant mood.     SKIN: Frank phototype 3  - Telemedicine photographs reviewed (Date of images: 11/12/24. Image quality and interpretability: acceptable. Location of teledermatologist: [facility] [place].   - pigment alteration at sites of previous inflammation  - pink papules and pustules on groin and back  - No other lesions of concern on areas examined.     Past Medical History:   Past Medical History:   Diagnosis Date     ADHD      Asthma      Dyslipidemia      Obesity      Past Surgical History:   Procedure Laterality Date     TONSILLECTOMY Bilateral     childhood       Social History:   reports that he has quit smoking. His smoking use included other. He has never used smokeless tobacco. He reports current alcohol use of about 2.0 standard drinks of alcohol per week. He reports that he does not currently use drugs.    Family History:  Family History   Problem Relation Age of Onset     Snoring Mother      Hypertension Mother      Diabetes Maternal Grandmother      Liver Disease No family hx of      Colon Cancer No family hx of        Medications:  Current Outpatient Medications   Medication Sig Dispense Refill     albuterol (PROAIR HFA/PROVENTIL HFA/VENTOLIN HFA) 108 (90 Base) MCG/ACT inhaler Inhale 1-2 puffs into the lungs every 6 hours as needed for shortness of breath or wheezing 18 g 3     azelaic acid (FINACIA) 15 % external gel Apply topically daily. 50 g 3     clindamycin (CLEOCIN-T) 1 % external gel Apply  topically 2 times daily 100 mL 1     fluticasone (FLOVENT HFA) 110 MCG/ACT inhaler INHALE 2 PUFFS TWICE A DAY FOR TWO WEEKS AT ONSET OF URI. RINSE AFTER USE. 1 g 3     guanFACINE (INTUNIV) 2 MG TB24 24 hr tablet Take 1 tablet (2 mg) by mouth at bedtime. 90 tablet 1     ISOtretinoin (ACCUTANE) 20 MG capsule Take 20 mg daily with fatty meal 30 capsule 0     ketoconazole (NIZORAL) 2 % external cream Apply to scaly areas between toes twice daily for at least 2 weeks 60 g 3     methylphenidate HCl ER, OSM, (CONCERTA) 36 MG CR tablet Take 2 tablets (72 mg) by mouth every morning. 60 tablet 0     pregabalin (LYRICA) 50 MG capsule Take 1 capsule (50 mg) by mouth 2 times daily 60 capsule 0     sildenafil (REVATIO) 20 MG tablet Take 1-2 tablets (20-40 mg) by mouth daily as needed (take 1 hour before intercourse as needed.) May increase up to 100mg dose ( 5 tablets) if needed.  Use lowest effective dose. 30 tablet 3     tretinoin (RETIN-A) 0.025 % external cream USE EVERY NIGHT (Patient not taking: Reported on 10/15/2024) 45 g 3     Allergies   Allergen Reactions     Seasonal Allergies                Again, thank you for allowing me to participate in the care of your patient.      Sincerely,    Yobany Mitchell MD

## 2024-11-12 NOTE — NURSING NOTE
Dermatology Rooming Note    Miguel Angel Hernandez's goals for this visit include:   Chief Complaint   Patient presents with    Derm Problem     Acne recheck     Jaiden CHAIDEZ, CMA

## 2024-11-14 ENCOUNTER — TELEPHONE (OUTPATIENT)
Dept: DERMATOLOGY | Facility: CLINIC | Age: 29
End: 2024-11-14
Payer: COMMERCIAL

## 2024-11-14 NOTE — TELEPHONE ENCOUNTER
[facility] Call Center    Phone Message    May a detailed message be left on voicemail: yes     Reason for Call: Other: Pt can not make the appts scheduled dates of 01/10/2025 and 03/13/2025. Pt needs morning appts please. Please call pt to coordinate.      Action Taken: TE SENT    Travel Screening: Not Applicable     Date of Service:                     Thank you!  [facility]                                                   English

## 2024-11-15 ENCOUNTER — TRANSFERRED RECORDS (OUTPATIENT)
Dept: HEALTH INFORMATION MANAGEMENT | Facility: CLINIC | Age: 29
End: 2024-11-15
Payer: COMMERCIAL

## 2024-11-15 NOTE — TELEPHONE ENCOUNTER
Called pt to reschedule appts due to template change and pt request.   Left vm.     Guerita Clement, Complex  11/15/2024 1:53 PM

## 2024-11-19 ENCOUNTER — MYC REFILL (OUTPATIENT)
Dept: INTERNAL MEDICINE | Facility: CLINIC | Age: 29
End: 2024-11-19

## 2024-11-19 ENCOUNTER — LAB (OUTPATIENT)
Dept: LAB | Facility: CLINIC | Age: 29
End: 2024-11-19
Payer: COMMERCIAL

## 2024-11-19 DIAGNOSIS — Z51.81 THERAPEUTIC DRUG MONITORING: ICD-10-CM

## 2024-11-19 DIAGNOSIS — F90.9 ATTENTION DEFICIT HYPERACTIVITY DISORDER (ADHD), UNSPECIFIED ADHD TYPE: ICD-10-CM

## 2024-11-19 LAB
ALBUMIN SERPL BCG-MCNC: 4.5 G/DL (ref 3.5–5.2)
ALP SERPL-CCNC: 84 U/L (ref 40–150)
ALT SERPL W P-5'-P-CCNC: 63 U/L (ref 0–70)
ANION GAP SERPL CALCULATED.3IONS-SCNC: 13 MMOL/L (ref 7–15)
AST SERPL W P-5'-P-CCNC: 35 U/L (ref 0–45)
BILIRUB SERPL-MCNC: 0.3 MG/DL
BUN SERPL-MCNC: 18.3 MG/DL (ref 6–20)
CALCIUM SERPL-MCNC: 8.9 MG/DL (ref 8.8–10.4)
CHLORIDE SERPL-SCNC: 107 MMOL/L (ref 98–107)
CHOLEST SERPL-MCNC: 236 MG/DL
CREAT SERPL-MCNC: 1.1 MG/DL (ref 0.67–1.17)
EGFRCR SERPLBLD CKD-EPI 2021: >90 ML/MIN/1.73M2
FASTING STATUS PATIENT QL REPORTED: NO
FASTING STATUS PATIENT QL REPORTED: NO
GLUCOSE SERPL-MCNC: 125 MG/DL (ref 70–99)
HCO3 SERPL-SCNC: 23 MMOL/L (ref 22–29)
HDLC SERPL-MCNC: 53 MG/DL
LDLC SERPL CALC-MCNC: 172 MG/DL
NONHDLC SERPL-MCNC: 183 MG/DL
POTASSIUM SERPL-SCNC: 3.7 MMOL/L (ref 3.4–5.3)
PROT SERPL-MCNC: 6.8 G/DL (ref 6.4–8.3)
SODIUM SERPL-SCNC: 143 MMOL/L (ref 135–145)
TRIGL SERPL-MCNC: 56 MG/DL

## 2024-11-19 PROCEDURE — 80061 LIPID PANEL: CPT

## 2024-11-19 PROCEDURE — 80053 COMPREHEN METABOLIC PANEL: CPT

## 2024-11-19 PROCEDURE — 36415 COLL VENOUS BLD VENIPUNCTURE: CPT

## 2024-11-19 RX ORDER — METHYLPHENIDATE HYDROCHLORIDE 36 MG/1
72 TABLET ORAL EVERY MORNING
Qty: 60 TABLET | Refills: 0 | Status: SHIPPED | OUTPATIENT
Start: 2024-11-21

## 2024-11-19 NOTE — TELEPHONE ENCOUNTER
Refill request received for:  Methylphenidate Er 36 Mg Tab  [place]  data reviewed:  Medication last refill: 60 tab, 30 day supply, filled/sold to patient on 10/22/2024  Date of LOV r/t Medication: 10/30/2024  Future appt scheduled? Yes: 12/12/2024     Pended order: Methylphenidate Er 36 Mg Tab, 60 tab, 30 day supply, fill on or after 11/21/2024     Refill request forwarded to provider for review.     Tammy HOU LPN  [facility]

## 2024-11-21 DIAGNOSIS — L70.0 ACNE VULGARIS: ICD-10-CM

## 2024-11-21 RX ORDER — ISOTRETINOIN 40 MG/1
40 CAPSULE ORAL DAILY
Qty: 30 CAPSULE | Refills: 0 | Status: SHIPPED | OUTPATIENT
Start: 2024-11-21

## 2024-12-10 ENCOUNTER — VIRTUAL VISIT (OUTPATIENT)
Dept: DERMATOLOGY | Facility: CLINIC | Age: 29
End: 2024-12-10
Payer: COMMERCIAL

## 2024-12-10 DIAGNOSIS — Z72.51 UNPROTECTED SEX: ICD-10-CM

## 2024-12-10 DIAGNOSIS — L70.0 ACNE VULGARIS: Primary | ICD-10-CM

## 2024-12-10 DIAGNOSIS — L73.2 HIDRADENITIS SUPPURATIVA: ICD-10-CM

## 2024-12-10 DIAGNOSIS — L21.9 DERMATITIS, SEBORRHEIC: ICD-10-CM

## 2024-12-10 DIAGNOSIS — Z51.81 THERAPEUTIC DRUG MONITORING: ICD-10-CM

## 2024-12-10 RX ORDER — ISOTRETINOIN 40 MG/1
40 CAPSULE ORAL DAILY
Qty: 30 CAPSULE | Refills: 0 | Status: SHIPPED | OUTPATIENT
Start: 2024-12-10

## 2024-12-10 ASSESSMENT — PAIN SCALES - GENERAL: PAINLEVEL_OUTOF10: NO PAIN (0)

## 2024-12-10 NOTE — PROGRESS NOTES
[facility] Dermatology Note  Encounter Date: Dec 10, 2024  Store-and-Forward and Telephone ([phone] ). Location of teledermatologist: [facility] [place].  Start time: 11:01. End time: 11:11.    [facility] Dermatology Note    Encounter Date: Dec 10, 2024    Dermatology Problem List:  # Acne/folliculitis on chest and back with possible HS (Wiseman Stage I) in groin  - Current txt: isotretinoin 20 mg daily (started 10/15/24)  - CMP and lipids ordered 10/15/24  - Prior txt: BPO wash, clinda lotion, differin gel, doxycyline (didn't tolerate 2/2 GI upset), Rx'd minocycline but never took this  - 20/40  - Cumulative dose 1800mg (12/10/24)  - Goal dose 25,000    # Tinea pedis vs erythrasma   - ketoconazole cream BID  Major PMHx  #DVT    Major PMHx  -   ______________________________________    Impression/Plan:  Lenard was seen today for accutane.    Diagnoses and all orders for this visit:    Hidradenitis Suppuritiva   Acne vulgaris (Continuing focal point for medical care services related to serious/complex condition)  - doing well on 40mg, prefers to continue on this dose  - continue 40mg/d     Dermatitis, seborrheic  - OTC shampoos    Therapeutic drug monitoring  - cmp and lipids in context of MAFLD    Unprotected sex  - pt requests HIV, syphilis GC      Follow-up in 1 mo.       Staff Involved:  Staff Only    Yobany Mitchell MD   of Dermatology  Department of Dermatology  [facility]      CC:   Chief Complaint   Patient presents with    Accutane     Lenard is being called for an accutane follow-up and condition has remained the same since last seen.       History of Present Illness:  Mr. Miguel Angel Hernandez is a 29 year old male who presents as a return patient.    Last seen 11/12/24, continued on 40 mg/d due to MAFLD will keep dose and increase next visit.     Labs:      Physical exam:  Vitals: There were  no vitals taken for this visit.  GEN: well developed, well-nourished, in no acute distress, in a pleasant mood.     SKIN: Frank phototype 1  - Full skin, which includes the head/face, both arms, chest, back, abdomen,both legs, genitalia and/or groin buttocks, digits and/or nails, was examined.  - pink papules and pustules on face and back  - No other lesions of concern on areas examined.     Past Medical History:   Past Medical History:   Diagnosis Date    ADHD     Asthma     Dyslipidemia     Obesity      Past Surgical History:   Procedure Laterality Date    TONSILLECTOMY Bilateral     childhood       Social History:   reports that he has quit smoking. His smoking use included other. He has never used smokeless tobacco. He reports current alcohol use of about 2.0 standard drinks of alcohol per week. He reports that he does not currently use drugs.    Family History:  Family History   Problem Relation Age of Onset    Snoring Mother     Hypertension Mother     Diabetes Maternal Grandmother     Liver Disease No family hx of     Colon Cancer No family hx of        Medications:  Current Outpatient Medications   Medication Sig Dispense Refill    albuterol (PROAIR HFA/PROVENTIL HFA/VENTOLIN HFA) 108 (90 Base) MCG/ACT inhaler Inhale 1-2 puffs into the lungs every 6 hours as needed for shortness of breath or wheezing 18 g 3    azelaic acid (FINACIA) 15 % external gel Apply topically daily. 50 g 3    clindamycin (CLEOCIN-T) 1 % external gel Apply topically 2 times daily 100 mL 1    fluticasone (FLOVENT HFA) 110 MCG/ACT inhaler INHALE 2 PUFFS TWICE A DAY FOR TWO WEEKS AT ONSET OF URI. RINSE AFTER USE. 1 g 3    guanFACINE (INTUNIV) 2 MG TB24 24 hr tablet Take 1 tablet (2 mg) by mouth at bedtime. 90 tablet 1    ISOtretinoin (ACCUTANE) 20 MG capsule Take 20 mg daily with fatty meal 30 capsule 0    ISOtretinoin (ACCUTANE) 40 MG capsule Take 1 capsule (40 mg) by mouth daily. 30 capsule 0    ketoconazole (NIZORAL) 2 % external  cream Apply to scaly areas between toes twice daily for at least 2 weeks 60 g 3    methylphenidate HCl ER, OSM, (CONCERTA) 36 MG CR tablet Take 2 tablets (72 mg) by mouth every morning. 60 tablet 0    pregabalin (LYRICA) 50 MG capsule Take 1 capsule (50 mg) by mouth 2 times daily 60 capsule 0    sildenafil (REVATIO) 20 MG tablet Take 1-2 tablets (20-40 mg) by mouth daily as needed (take 1 hour before intercourse as needed.) May increase up to 100mg dose ( 5 tablets) if needed.  Use lowest effective dose. 30 tablet 3    tretinoin (RETIN-A) 0.025 % external cream USE EVERY NIGHT (Patient not taking: Reported on 12/10/2024) 45 g 3     Allergies   Allergen Reactions    Seasonal Allergies

## 2024-12-10 NOTE — LETTER
12/10/2024       RE: Miguel Angel Hernandez  [address] [address]     Dear Colleague,    Thank you for referring your patient, Miguel Angel Hernandez, to the [facility]ERMAT[facility] [place] at [facility]. Please see a copy of my visit note below.    [facility] Dermatology Note  Encounter Date: Dec 10, 2024  [employer]-and-Forward and Telephone ([phone] ). Location of teledermatologist: [facility] [place].  Start time: 11:01. End time: 11:11.    [facility] Dermatology Note    Encounter Date: Dec 10, 2024    Dermatology Problem List:  # Acne/folliculitis on chest and back with possible HS (Wiseman Stage I) in groin  - Current txt: isotretinoin 20 mg daily (started 10/15/24)  - CMP and lipids ordered 10/15/24  - Prior txt: BPO wash, clinda lotion, differin gel, doxycyline (didn't tolerate 2/2 GI upset), Rx'd minocycline but never took this  - 20/40  - Cumulative dose 1800mg (12/10/24)  - Goal dose 25,000    # Tinea pedis vs erythrasma   - ketoconazole cream BID  Major PMHx  #DVT    Major PMHx  -   ______________________________________    Impression/Plan:  Lenard was seen today for accutane.    Diagnoses and all orders for this visit:    Hidradenitis Suppuritiva   Acne vulgaris (Continuing focal point for medical care services related to serious/complex condition)  - doing well on 40mg, prefers to continue on this dose  - continue 40mg/d     Dermatitis, seborrheic  - OTC shampoos    Therapeutic drug monitoring  - cmp and lipids in context of MAFLD    Unprotected sex  - pt requests HIV, syphilis GC      Follow-up in 1 mo.       Staff Involved:  Staff Only    Yobany Mitchell MD   of Dermatology  Department of Dermatology  [facility]      CC:   Chief Complaint   Patient presents with     Accutane     Lenard is being called for an  accutane follow-up and condition has remained the same since last seen.       History of Present Illness:  Mr. Miguel Angel Hernandez is a 29 year old male who presents as a return patient.    Last seen 11/12/24, continued on 40 mg/d due to MAFLD will keep dose and increase next visit.     Labs:      Physical exam:  Vitals: There were no vitals taken for this visit.  GEN: well developed, well-nourished, in no acute distress, in a pleasant mood.     SKIN: Frank phototype 1  - Full skin, which includes the head/face, both arms, chest, back, abdomen,both legs, genitalia and/or groin buttocks, digits and/or nails, was examined.  - pink papules and pustules on face and back  - No other lesions of concern on areas examined.     Past Medical History:   Past Medical History:   Diagnosis Date     ADHD      Asthma      Dyslipidemia      Obesity      Past Surgical History:   Procedure Laterality Date     TONSILLECTOMY Bilateral     childhood       Social History:   reports that he has quit smoking. His smoking use included other. He has never used smokeless tobacco. He reports current alcohol use of about 2.0 standard drinks of alcohol per week. He reports that he does not currently use drugs.    Family History:  Family History   Problem Relation Age of Onset     Snoring Mother      Hypertension Mother      Diabetes Maternal Grandmother      Liver Disease No family hx of      Colon Cancer No family hx of        Medications:  Current Outpatient Medications   Medication Sig Dispense Refill     albuterol (PROAIR HFA/PROVENTIL HFA/VENTOLIN HFA) 108 (90 Base) MCG/ACT inhaler Inhale 1-2 puffs into the lungs every 6 hours as needed for shortness of breath or wheezing 18 g 3     azelaic acid (FINACIA) 15 % external gel Apply topically daily. 50 g 3     clindamycin (CLEOCIN-T) 1 % external gel Apply topically 2 times daily 100 mL 1     fluticasone (FLOVENT HFA) 110 MCG/ACT inhaler INHALE 2 PUFFS TWICE A DAY FOR TWO WEEKS AT ONSET OF  URI. RINSE AFTER USE. 1 g 3     guanFACINE (INTUNIV) 2 MG TB24 24 hr tablet Take 1 tablet (2 mg) by mouth at bedtime. 90 tablet 1     ISOtretinoin (ACCUTANE) 20 MG capsule Take 20 mg daily with fatty meal 30 capsule 0     ISOtretinoin (ACCUTANE) 40 MG capsule Take 1 capsule (40 mg) by mouth daily. 30 capsule 0     ketoconazole (NIZORAL) 2 % external cream Apply to scaly areas between toes twice daily for at least 2 weeks 60 g 3     methylphenidate HCl ER, OSM, (CONCERTA) 36 MG CR tablet Take 2 tablets (72 mg) by mouth every morning. 60 tablet 0     pregabalin (LYRICA) 50 MG capsule Take 1 capsule (50 mg) by mouth 2 times daily 60 capsule 0     sildenafil (REVATIO) 20 MG tablet Take 1-2 tablets (20-40 mg) by mouth daily as needed (take 1 hour before intercourse as needed.) May increase up to 100mg dose ( 5 tablets) if needed.  Use lowest effective dose. 30 tablet 3     tretinoin (RETIN-A) 0.025 % external cream USE EVERY NIGHT (Patient not taking: Reported on 12/10/2024) 45 g 3     Allergies   Allergen Reactions     Seasonal Allergies                     Again, thank you for allowing me to participate in the care of your patient.      Sincerely,    Yobany Mitchell MD

## 2024-12-10 NOTE — NURSING NOTE
Dermatology Rooming Note    Miguel Angel Hernandez's goals for this visit include:   Chief Complaint   Patient presents with    Accutane     Lenard is being called for an accutane follow-up and condition has remained the same since last seen.     Leo High, EMT

## 2024-12-12 ENCOUNTER — OFFICE VISIT (OUTPATIENT)
Dept: INTERNAL MEDICINE | Facility: CLINIC | Age: 29
End: 2024-12-12
Payer: COMMERCIAL

## 2024-12-12 DIAGNOSIS — Z91.199 NO-SHOW FOR APPOINTMENT: Primary | ICD-10-CM

## 2024-12-14 DIAGNOSIS — L70.0 ACNE VULGARIS: ICD-10-CM

## 2024-12-17 ENCOUNTER — LAB (OUTPATIENT)
Dept: LAB | Facility: CLINIC | Age: 29
End: 2024-12-17
Payer: COMMERCIAL

## 2024-12-17 DIAGNOSIS — Z72.51 UNPROTECTED SEX: ICD-10-CM

## 2024-12-17 DIAGNOSIS — Z51.81 THERAPEUTIC DRUG MONITORING: ICD-10-CM

## 2024-12-17 LAB
ALBUMIN SERPL BCG-MCNC: 4.4 G/DL (ref 3.5–5.2)
ALP SERPL-CCNC: 97 U/L (ref 40–150)
ALT SERPL W P-5'-P-CCNC: 96 U/L (ref 0–70)
ANION GAP SERPL CALCULATED.3IONS-SCNC: 11 MMOL/L (ref 7–15)
AST SERPL W P-5'-P-CCNC: 60 U/L (ref 0–45)
BILIRUB SERPL-MCNC: 0.2 MG/DL
BUN SERPL-MCNC: 17.7 MG/DL (ref 6–20)
C TRACH DNA SPEC QL PROBE+SIG AMP: NEGATIVE
CALCIUM SERPL-MCNC: 8.8 MG/DL (ref 8.8–10.4)
CHLORIDE SERPL-SCNC: 106 MMOL/L (ref 98–107)
CHOLEST SERPL-MCNC: 201 MG/DL
CREAT SERPL-MCNC: 1.13 MG/DL (ref 0.67–1.17)
EGFRCR SERPLBLD CKD-EPI 2021: >90 ML/MIN/1.73M2
FASTING STATUS PATIENT QL REPORTED: YES
FASTING STATUS PATIENT QL REPORTED: YES
GLUCOSE SERPL-MCNC: 104 MG/DL (ref 70–99)
HCO3 SERPL-SCNC: 24 MMOL/L (ref 22–29)
HDLC SERPL-MCNC: 34 MG/DL
HIV 1+2 AB+HIV1 P24 AG SERPL QL IA: NONREACTIVE
LDLC SERPL CALC-MCNC: 121 MG/DL
N GONORRHOEA DNA SPEC QL NAA+PROBE: NEGATIVE
NONHDLC SERPL-MCNC: 167 MG/DL
POTASSIUM SERPL-SCNC: 3.9 MMOL/L (ref 3.4–5.3)
PROT SERPL-MCNC: 6.5 G/DL (ref 6.4–8.3)
SODIUM SERPL-SCNC: 141 MMOL/L (ref 135–145)
T PALLIDUM AB SER QL: NONREACTIVE
TRIGL SERPL-MCNC: 232 MG/DL

## 2024-12-17 PROCEDURE — 86780 TREPONEMA PALLIDUM: CPT

## 2024-12-17 PROCEDURE — 36415 COLL VENOUS BLD VENIPUNCTURE: CPT

## 2024-12-17 PROCEDURE — 87591 N.GONORRHOEAE DNA AMP PROB: CPT

## 2024-12-17 PROCEDURE — 80053 COMPREHEN METABOLIC PANEL: CPT | Performed by: STUDENT IN AN ORGANIZED HEALTH CARE EDUCATION/TRAINING PROGRAM

## 2024-12-17 PROCEDURE — 87491 CHLMYD TRACH DNA AMP PROBE: CPT

## 2024-12-17 PROCEDURE — 80061 LIPID PANEL: CPT | Performed by: STUDENT IN AN ORGANIZED HEALTH CARE EDUCATION/TRAINING PROGRAM

## 2024-12-17 PROCEDURE — 87389 HIV-1 AG W/HIV-1&-2 AB AG IA: CPT | Performed by: STUDENT IN AN ORGANIZED HEALTH CARE EDUCATION/TRAINING PROGRAM

## 2024-12-17 PROCEDURE — 99000 SPECIMEN HANDLING OFFICE-LAB: CPT | Performed by: PATHOLOGY

## 2024-12-17 PROCEDURE — 82465 ASSAY BLD/SERUM CHOLESTEROL: CPT | Performed by: STUDENT IN AN ORGANIZED HEALTH CARE EDUCATION/TRAINING PROGRAM

## 2024-12-19 ENCOUNTER — MYC REFILL (OUTPATIENT)
Dept: INTERNAL MEDICINE | Facility: CLINIC | Age: 29
End: 2024-12-19
Payer: COMMERCIAL

## 2024-12-19 DIAGNOSIS — F90.9 ATTENTION DEFICIT HYPERACTIVITY DISORDER (ADHD), UNSPECIFIED ADHD TYPE: ICD-10-CM

## 2024-12-19 RX ORDER — METHYLPHENIDATE HYDROCHLORIDE 36 MG/1
72 TABLET ORAL EVERY MORNING
Qty: 60 TABLET | Refills: 0 | Status: SHIPPED | OUTPATIENT
Start: 2024-12-19

## 2024-12-19 RX ORDER — ISOTRETINOIN 40 MG/1
CAPSULE, LIQUID FILLED ORAL
Qty: 30 CAPSULE | Refills: 0 | OUTPATIENT
Start: 2024-12-19

## 2024-12-23 ENCOUNTER — MYC REFILL (OUTPATIENT)
Dept: INTERNAL MEDICINE | Facility: CLINIC | Age: 29
End: 2024-12-23
Payer: COMMERCIAL

## 2024-12-23 DIAGNOSIS — R05.9 COUGH, UNSPECIFIED TYPE: ICD-10-CM

## 2024-12-23 DIAGNOSIS — M25.511 CHRONIC RIGHT SHOULDER PAIN: ICD-10-CM

## 2024-12-23 DIAGNOSIS — R10.2 PELVIC PAIN IN MALE: ICD-10-CM

## 2024-12-23 DIAGNOSIS — J45.909 ASTHMA: ICD-10-CM

## 2024-12-23 DIAGNOSIS — Z11.3 SCREEN FOR STD (SEXUALLY TRANSMITTED DISEASE): ICD-10-CM

## 2024-12-23 DIAGNOSIS — G89.29 CHRONIC RIGHT SHOULDER PAIN: ICD-10-CM

## 2024-12-30 RX ORDER — FLUTICASONE PROPIONATE 110 UG/1
AEROSOL, METERED RESPIRATORY (INHALATION)
Qty: 1 G | Refills: 0 | Status: SHIPPED | OUTPATIENT
Start: 2024-12-30

## 2024-12-30 RX ORDER — ALBUTEROL SULFATE 90 UG/1
1-2 INHALANT RESPIRATORY (INHALATION) EVERY 6 HOURS PRN
Qty: 18 G | Refills: 0 | Status: SHIPPED | OUTPATIENT
Start: 2024-12-30

## 2024-12-30 NOTE — TELEPHONE ENCOUNTER
LVD:  10/30/2024  [facility] Internal Medicine Layo Vaughn MD  Internal Medicine     Refilled per protocol.

## 2025-01-13 ENCOUNTER — MYC MEDICAL ADVICE (OUTPATIENT)
Dept: DERMATOLOGY | Facility: CLINIC | Age: 30
End: 2025-01-13
Payer: COMMERCIAL

## 2025-01-14 NOTE — TELEPHONE ENCOUNTER
Impression/Plan:  Lenard was seen today for derm problem.     Diagnoses and all orders for this visit:     Post-inflammatory pigmentary changes  Acne vulgaris (Continuing focal point for medical care services related to serious/complex condition)  -     ISOtretinoin (ABSORICA) 30 MG capsule; Take 1 capsule (30 mg) by mouth 2 times daily.  - increase to 60mg/d  - has NAFLD  - lft elevations have been <3x ULN

## 2025-01-19 ENCOUNTER — MYC REFILL (OUTPATIENT)
Dept: INTERNAL MEDICINE | Facility: CLINIC | Age: 30
End: 2025-01-19
Payer: COMMERCIAL

## 2025-01-19 DIAGNOSIS — F90.9 ATTENTION DEFICIT HYPERACTIVITY DISORDER (ADHD), UNSPECIFIED ADHD TYPE: ICD-10-CM

## 2025-01-20 RX ORDER — METHYLPHENIDATE HYDROCHLORIDE 36 MG/1
72 TABLET ORAL EVERY MORNING
Qty: 60 TABLET | Refills: 0 | Status: SHIPPED | OUTPATIENT
Start: 2025-01-22 | End: 2025-01-23

## 2025-01-20 NOTE — TELEPHONE ENCOUNTER
Controlled substance refill request notes    Refill request received for: Methylphenidate Er 36 Mg Tab  [place]  data reviewed 01/20/25:  Medication last refill: 60 tab, 30 day supply, filled/sold to patient on 12/23/2024  Pended order: Methylphenidate Er 36 Mg Tab, 60 tab, 30 day supply, fill on or after 01/22/2025     Primary care provider: Layo Gage  Last office visit this department: 12/12/2024  Last virtual visit this department: Visit date not found  Next appointment with PCP: 01/22/2025    Refill request forwarded to provider for review.     Tammy HOU LPN  [facility]

## 2025-01-20 NOTE — TELEPHONE ENCOUNTER
Please call patient to discuss 1-3:30 pm today or tomorrow 10:30 am or 1:30 pm if you don't reach him please call him back he only has 2 days left of this med he is upset and wanting to reach out to patient relations if his issue is not solved. Patient called in because he is only aloud to renew med when he has 1 pill left patient does not understand why he can only do this when he has one pill left. Patient states insurance lets him pick it up when he has 5 days left please call him.

## 2025-01-21 NOTE — TELEPHONE ENCOUNTER
"Called patient- he is amicable to try an increased quantity on the rx and still fill every 30 days. Dr. Gage and him will discuss more tomorrow. He also may talk to Dr. Gage about having advance rx on file for \"refills\", but mainly wants to focus on fill time and quantity.     Lenard Meeks RN on 1/21/2025 at 1:34 PM    "

## 2025-01-22 ENCOUNTER — OFFICE VISIT (OUTPATIENT)
Dept: INTERNAL MEDICINE | Facility: CLINIC | Age: 30
End: 2025-01-22
Payer: COMMERCIAL

## 2025-01-22 ENCOUNTER — TELEPHONE (OUTPATIENT)
Dept: INTERNAL MEDICINE | Facility: CLINIC | Age: 30
End: 2025-01-22

## 2025-01-22 DIAGNOSIS — R74.8 ELEVATED LIVER ENZYMES: Primary | ICD-10-CM

## 2025-01-22 PROCEDURE — 99207 PR NO CHARGE LOS: CPT | Performed by: INTERNAL MEDICINE

## 2025-01-23 DIAGNOSIS — F90.9 ATTENTION DEFICIT HYPERACTIVITY DISORDER (ADHD), UNSPECIFIED ADHD TYPE: ICD-10-CM

## 2025-01-23 RX ORDER — METHYLPHENIDATE HYDROCHLORIDE 36 MG/1
72 TABLET ORAL EVERY MORNING
Qty: 64 TABLET | Refills: 0 | Status: SHIPPED | OUTPATIENT
Start: 2025-01-23

## 2025-02-07 DIAGNOSIS — J45.909 ASTHMA: ICD-10-CM

## 2025-02-11 ENCOUNTER — VIRTUAL VISIT (OUTPATIENT)
Dept: DERMATOLOGY | Facility: CLINIC | Age: 30
End: 2025-02-11
Payer: COMMERCIAL

## 2025-02-11 DIAGNOSIS — Z51.81 THERAPEUTIC DRUG MONITORING: ICD-10-CM

## 2025-02-11 DIAGNOSIS — L70.0 ACNE VULGARIS: ICD-10-CM

## 2025-02-11 DIAGNOSIS — L70.8 OTHER ACNE: Primary | ICD-10-CM

## 2025-02-11 RX ORDER — ISOTRETINOIN 30 MG/1
30 CAPSULE ORAL 2 TIMES DAILY
Qty: 60 CAPSULE | Refills: 0 | Status: SHIPPED | OUTPATIENT
Start: 2025-02-11

## 2025-02-11 ASSESSMENT — PAIN SCALES - GENERAL: PAINLEVEL_OUTOF10: MILD PAIN (2)

## 2025-02-11 NOTE — NURSING NOTE
Dermatology Rooming Note    Miguel Angel Hernandez's goals for this visit include:   Chief Complaint   Patient presents with    Accutane     Pt reports that his shoulder have gotten worse than ever before. All other areas are unchanged since last visit. Reports increased dryness on nose and ears that may be due to the medication.     Shelly Matamoros CA

## 2025-02-11 NOTE — LETTER
2/11/2025       RE: Miguel Angel Hernandez  [address] [address]     Dear Colleague,    Thank you for referring your patient, Miguel Angel Hernandez, to the [facility] DERMATOLOGY CLINIC [place] at [facility]. Please see a copy of my visit note below.    Virtual Visit Details    Type of service:  Telephone Visit   Phone call duration: 15 minutes   Originating Location (pt. Location): Home    Distant Location (provider location):  On-site  Telephone visit completed due to the patient did not consent to a video visit.    [facility] Dermatology Note    Encounter Date: Feb 11, 2025    Dermatology Problem List:  # Acne/folliculitis on chest and back with possible HS (Wiseman Stage I) in groin  - Current txt: isotretinoin 20 mg daily (started 10/15/24)  - CMP and lipids ordered 10/15/24  - Prior txt: BPO wash, clinda lotion, differin gel, doxycyline (didn't tolerate 2/2 GI upset), Rx'd minocycline but never took this  - 20/40/40/60  - Cumulative dose 4800mg (01/10/25)  - Goal dose 25,000     # Tinea pedis vs erythrasma   - ketoconazole cream BID  Major PMHx  #DVT    Major PMHx  -   ______________________________________    Impression/Plan:  Lenard was seen today for accutane.    Diagnoses and all orders for this visit:    Other acne (Continuing focal point for medical care services related to serious/complex condition)  - continue 60mjg/d  - back worse, flare coincides w/ prednisone  - sweating from snow boarding may be playing a role   - plans to make healthy changes to diet, discussed skim milk and high GI     Therapeutic drug monitoring  - check CMP, CBC, lipids next visit     Describes dryness on ears and nose (perniosis?) did not send pics    Follow-up in 1 mo.       Staff Involved:  Staff Only    Yobany Mitchell MD   of Dermatology  Department of Dermatology  [facility]      CC:    Chief Complaint   Patient presents with     Accutane     Pt reports that his shoulder have gotten worse than ever before. All other areas are unchanged since last visit. Reports increased dryness on nose and ears that may be due to the medication.       History of Present Illness:  Mr. Miguel Angel Hernandez is a 29 year old male who presents as a new patient.    Lenard was last seen by me for acne been treating with isotretinoin on January 10.  At that time we decided to increase to 60 mg a day.  Keeping in mind that he does have nonalcoholic fatty liver disease and he has had intermittent LFT elevations usually less than 2 times the upper limit of normal.  He follows with gastroenterology for management of this condition.    He states that his shoulders have gotten worse than ever before and all other areas are unchanged since the last visit.  He reports increased dryness on his nose and ears that he suspects could possibly be due to the medication.     Shoulders flaring 2-3 weeks ago. Maybe aroudn time of pred. Is snowboarding 6-7 days a week.     Labs:      Physical exam:  Vitals: There were no vitals taken for this visit.  GEN: well developed, well-nourished, in no acute distress, in a pleasant mood.     SKIN: Frank phototype 3  - Telemedicine photographs reviewed (Date of images: 02/11/25. Image quality and interpretability: acceptable. Location of teledermatologist: [facility] [place].   - pink papules and pustules on face and back  - No other lesions of concern on areas examined.     Past Medical History:   Past Medical History:   Diagnosis Date     ADHD      Asthma      Dyslipidemia      Obesity      Past Surgical History:   Procedure Laterality Date     TONSILLECTOMY Bilateral     childhood       Social History:   reports that he has quit smoking. His smoking use included other. He has never used smokeless tobacco. He reports current alcohol use of about  2.0 standard drinks of alcohol per week. He reports that he does not currently use drugs.    Family History:  Family History   Problem Relation Age of Onset     Snoring Mother      Hypertension Mother      Diabetes Maternal Grandmother      Liver Disease No family hx of      Colon Cancer No family hx of      Melanoma No family hx of      Skin Cancer No family hx of        Medications:  Current Outpatient Medications   Medication Sig Dispense Refill     albuterol (PROAIR HFA/PROVENTIL HFA/VENTOLIN HFA) 108 (90 Base) MCG/ACT inhaler Inhale 1-2 puffs into the lungs every 6 hours as needed for shortness of breath or wheezing. 18 g 0     azelaic acid (FINACIA) 15 % external gel Apply topically daily. 50 g 3     clindamycin (CLEOCIN-T) 1 % external gel Apply topically 2 times daily 100 mL 1     fluticasone (FLOVENT HFA) 110 MCG/ACT inhaler INHALE 2 PUFFS TWICE A DAY FOR TWO WEEKS AT ONSET OF URI. RINSE AFTER USE. 1 g 0     guanFACINE (INTUNIV) 2 MG TB24 24 hr tablet Take 1 tablet (2 mg) by mouth at bedtime. 90 tablet 1     ISOtretinoin (ABSORICA) 30 MG capsule Take 1 capsule (30 mg) by mouth 2 times daily. 60 capsule 0     ISOtretinoin (ACCUTANE) 20 MG capsule Take 20 mg daily with fatty meal 30 capsule 0     ISOtretinoin (ACCUTANE) 40 MG capsule Take 1 capsule (40 mg) by mouth daily. 30 capsule 0     ketoconazole (NIZORAL) 2 % external cream Apply to scaly areas between toes twice daily for at least 2 weeks 60 g 3     methylphenidate HCl ER, OSM, (CONCERTA) 36 MG CR tablet Take 2 tablets (72 mg) by mouth every morning. 64 tablet 0     pregabalin (LYRICA) 50 MG capsule Take 1 capsule (50 mg) by mouth 2 times daily 60 capsule 0     sildenafil (REVATIO) 20 MG tablet Take 1-2 tablets (20-40 mg) by mouth daily as needed (take 1 hour before intercourse as needed.) May increase up to 100mg dose ( 5 tablets) if needed.  Use lowest effective dose. 30 tablet 3     tretinoin (RETIN-A) 0.025 % external cream USE EVERY NIGHT 45 g 3      Allergies   Allergen Reactions     Seasonal Allergies                Again, thank you for allowing me to participate in the care of your patient.      Sincerely,    Yobany Mitchell MD

## 2025-02-11 NOTE — PROGRESS NOTES
Virtual Visit Details    Type of service:  Telephone Visit   Phone call duration: 15 minutes   Originating Location (pt. Location): Home    Distant Location (provider location):  On-site  Telephone visit completed due to the patient did not consent to a video visit.    [facility] Dermatology Note    Encounter Date: Feb 11, 2025    Dermatology Problem List:  # Acne/folliculitis on chest and back with possible HS (Wiseman Stage I) in groin  - Current txt: isotretinoin 20 mg daily (started 10/15/24)  - CMP and lipids ordered 10/15/24  - Prior txt: BPO wash, clinda lotion, differin gel, doxycyline (didn't tolerate 2/2 GI upset), Rx'd minocycline but never took this  - 20/40/40/60  - Cumulative dose 4800mg (01/10/25)  - Goal dose 25,000     # Tinea pedis vs erythrasma   - ketoconazole cream BID  Major PMHx  #DVT    Major PMHx  -   ______________________________________    Impression/Plan:  Lenard was seen today for accutane.    Diagnoses and all orders for this visit:    Other acne (Continuing focal point for medical care services related to serious/complex condition)  - continue 60mjg/d  - back worse, flare coincides w/ prednisone  - sweating from snow boarding may be playing a role   - plans to make healthy changes to diet, discussed skim milk and high GI     Therapeutic drug monitoring  - check CMP, CBC, lipids next visit     Describes dryness on ears and nose (perniosis?) did not send pics    Follow-up in 1 mo.       Staff Involved:  Staff Only    Yobany Mitchell MD   of Dermatology  Department of Dermatology  [facility]      CC:   Chief Complaint   Patient presents with    Accutane     Pt reports that his shoulder have gotten worse than ever before. All other areas are unchanged since last visit. Reports increased dryness on nose and ears that may be due to the medication.       History of Present Illness:  Mr. Miguel Angel Hernandez is a 29 year old male  who presents as a new patient.    Lenard was last seen by me for acne been treating with isotretinoin on January 10.  At that time we decided to increase to 60 mg a day.  Keeping in mind that he does have nonalcoholic fatty liver disease and he has had intermittent LFT elevations usually less than 2 times the upper limit of normal.  He follows with gastroenterology for management of this condition.    He states that his shoulders have gotten worse than ever before and all other areas are unchanged since the last visit.  He reports increased dryness on his nose and ears that he suspects could possibly be due to the medication.     Shoulders flaring 2-3 weeks ago. Maybe aroudn time of pred. Is snowboarding 6-7 days a week.     Labs:      Physical exam:  Vitals: There were no vitals taken for this visit.  GEN: well developed, well-nourished, in no acute distress, in a pleasant mood.     SKIN: Frank phototype 3  - Telemedicine photographs reviewed (Date of images: 02/11/25. Image quality and interpretability: acceptable. Location of teledermatologist: [facility] [place].   - pink papules and pustules on face and back  - No other lesions of concern on areas examined.     Past Medical History:   Past Medical History:   Diagnosis Date    ADHD     Asthma     Dyslipidemia     Obesity      Past Surgical History:   Procedure Laterality Date    TONSILLECTOMY Bilateral     childhood       Social History:   reports that he has quit smoking. His smoking use included other. He has never used smokeless tobacco. He reports current alcohol use of about 2.0 standard drinks of alcohol per week. He reports that he does not currently use drugs.    Family History:  Family History   Problem Relation Age of Onset    Snoring Mother     Hypertension Mother     Diabetes Maternal Grandmother     Liver Disease No family hx of     Colon Cancer No family hx of     Melanoma No family hx of      Skin Cancer No family hx of        Medications:  Current Outpatient Medications   Medication Sig Dispense Refill    albuterol (PROAIR HFA/PROVENTIL HFA/VENTOLIN HFA) 108 (90 Base) MCG/ACT inhaler Inhale 1-2 puffs into the lungs every 6 hours as needed for shortness of breath or wheezing. 18 g 0    azelaic acid (FINACIA) 15 % external gel Apply topically daily. 50 g 3    clindamycin (CLEOCIN-T) 1 % external gel Apply topically 2 times daily 100 mL 1    fluticasone (FLOVENT HFA) 110 MCG/ACT inhaler INHALE 2 PUFFS TWICE A DAY FOR TWO WEEKS AT ONSET OF URI. RINSE AFTER USE. 1 g 0    guanFACINE (INTUNIV) 2 MG TB24 24 hr tablet Take 1 tablet (2 mg) by mouth at bedtime. 90 tablet 1    ISOtretinoin (ABSORICA) 30 MG capsule Take 1 capsule (30 mg) by mouth 2 times daily. 60 capsule 0    ISOtretinoin (ACCUTANE) 20 MG capsule Take 20 mg daily with fatty meal 30 capsule 0    ISOtretinoin (ACCUTANE) 40 MG capsule Take 1 capsule (40 mg) by mouth daily. 30 capsule 0    ketoconazole (NIZORAL) 2 % external cream Apply to scaly areas between toes twice daily for at least 2 weeks 60 g 3    methylphenidate HCl ER, OSM, (CONCERTA) 36 MG CR tablet Take 2 tablets (72 mg) by mouth every morning. 64 tablet 0    pregabalin (LYRICA) 50 MG capsule Take 1 capsule (50 mg) by mouth 2 times daily 60 capsule 0    sildenafil (REVATIO) 20 MG tablet Take 1-2 tablets (20-40 mg) by mouth daily as needed (take 1 hour before intercourse as needed.) May increase up to 100mg dose ( 5 tablets) if needed.  Use lowest effective dose. 30 tablet 3    tretinoin (RETIN-A) 0.025 % external cream USE EVERY NIGHT 45 g 3     Allergies   Allergen Reactions    Seasonal Allergies

## 2025-02-12 RX ORDER — FLUTICASONE PROPIONATE 110 UG/1
AEROSOL, METERED RESPIRATORY (INHALATION)
Qty: 1 G | Refills: 0 | Status: SHIPPED | OUTPATIENT
Start: 2025-02-12

## 2025-02-12 NOTE — TELEPHONE ENCOUNTER
Last Written Prescription:  fluticasone (FLOVENT HFA) 110 MCG/ACT inhaler 1 g 0 12/30/2024 -- No   Sig: INHALE 2 PUFFS TWICE A DAY FOR TWO WEEKS AT ONSET OF URI. RINSE AFTER USE.     ----------------------  Last Visit Date: 10-30-24  Future Visit Date: 3-4-25  ----------------------  REFILL per protocol    Request from pharmacy:  Requested Prescriptions   Pending Prescriptions Disp Refills    fluticasone (FLOVENT HFA) 110 MCG/ACT inhaler [Pharmacy Med Name: FLUTICASONE PROP  MCG]       Sig: INHALE 2 PUFFS TWICE A DAY FOR TWO WEEKS AT ONSET OF URI. RINSE AFTER USE.       Inhaled Steroids Protocol Passed - 2/12/2025  8:11 AM        Passed - Patient is age 12 or older        Passed - Asthma control assessment score within normal limits in last 6 months     Please review ACT score.           Passed - Medication is active on med list        Passed - Recent (6 mo) or future (90 days) visit within the authorizing provider's specialty     The patient must have completed an in-person or virtual visit within the past 6 months or has a future visit scheduled within the next 90 days with the authorizing provider s specialty.  Urgent care and e-visits do not quality as an office visit for this protocol.          Passed - Medication indicated for associated diagnosis     Medication is associated with one or more of the following diagnoses:    Allergies   Asthma   COPD   Nasal Congestion   Nasal Polyps   Rhinitis   Cystic Fibrosis   Bronchiectasis

## 2025-02-15 DIAGNOSIS — F90.9 ATTENTION DEFICIT HYPERACTIVITY DISORDER (ADHD), UNSPECIFIED ADHD TYPE: ICD-10-CM

## 2025-02-18 ENCOUNTER — OFFICE VISIT (OUTPATIENT)
Dept: GASTROENTEROLOGY | Facility: CLINIC | Age: 30
End: 2025-02-18
Attending: INTERNAL MEDICINE
Payer: COMMERCIAL

## 2025-02-18 ENCOUNTER — MYC REFILL (OUTPATIENT)
Dept: INTERNAL MEDICINE | Facility: CLINIC | Age: 30
End: 2025-02-18
Payer: COMMERCIAL

## 2025-02-18 ENCOUNTER — MYC MEDICAL ADVICE (OUTPATIENT)
Dept: INTERNAL MEDICINE | Facility: CLINIC | Age: 30
End: 2025-02-18
Payer: COMMERCIAL

## 2025-02-18 DIAGNOSIS — F90.9 ATTENTION DEFICIT HYPERACTIVITY DISORDER (ADHD), UNSPECIFIED ADHD TYPE: ICD-10-CM

## 2025-02-18 DIAGNOSIS — R74.8 ELEVATED LIVER ENZYMES: ICD-10-CM

## 2025-02-18 PROCEDURE — 91200 LIVER ELASTOGRAPHY: CPT | Mod: 26 | Performed by: PHYSICIAN ASSISTANT

## 2025-02-18 RX ORDER — METHYLPHENIDATE HYDROCHLORIDE 36 MG/1
72 TABLET ORAL EVERY MORNING
Qty: 64 TABLET | Refills: 0 | Status: SHIPPED | OUTPATIENT
Start: 2025-02-23

## 2025-02-18 NOTE — LETTER
March 3, 2025      Lenard Hernandez  [address] [address]        Dear ,    We are writing to inform you of your test results.    I placed a hepatology referral for you yesterday and you can call [phone] to schedule this appointment.    Resulted Orders   Fibrosis Scan    Impression    Patient: Miguel Angel Hernandez  YOB: 1995  Medical Record Number: [id number]    ORDERING PROVIDER: Layo Gage MD    EXAMINATION:     Liver FibroScan     DATE OF EXAM:   2/18/25    INDICATION:     Liver fibrosis assessment     HISTORY:     MASH/MASLD     A series of at least 10 Vibration Controlled Transient Elastography (VCTE) measurements were performed by placing the XL probe over the center of the liver parenchyma and mechanically inducing a 50 Hertz shear wave.     Each resulting VCTE measurement was analyzed to determine shear wave propagation speed and calculate the equivalent liver stiffness.     All measurements were reviewed by the  and physician for technical accuracy. Data variability across the acquired measurements was quantified with IQR/Median Percentage.     FINDINGS:     The median liver stiffness was 8.4 kPa with IQR/Median percentage of 14 %.     The measure CAP ultrasound attenuation rate value was 364 dB/m.     IMPRESSION:       1. Estimated liver fibrosis is Stage 2  2. Steatosis Grade S3  3. Results likely falsely elevated due to increased skin-to-capsule distance. Consider alternative modality for fibrosis assessment or repeat FibroScan after period of weight loss.     The results of the FibroScan examination were assessed by taking into account the quality of the measurement thumbnails, number of measurements, and IQR/Median ratio. I have personally reviewed the examination and initial interpretation, and I agree with the findings.      Delaney Cooley PA-C             If you have any questions or concerns, please call the clinic at the number listed above.        Sincerely,      Layo Gage MD    Electronically signed

## 2025-02-18 NOTE — Clinical Note
2/18/2025      Miguel Angel Hernandez  [address] [address]      Dear Colleague,    Thank you for referring your patient, Miguel Angel Hernandez, to the [facility] [place]. Please see a copy of my visit note below.    No notes on file    Again, thank you for allowing me to participate in the care of your patient.        Sincerely,        Fibrosis Scan    Electronically signed

## 2025-02-18 NOTE — LETTER
March 3, 2025      Lenard SADIA Quesadasanchez  [address] [facility] [address]        Dear ,    We are writing to inform you of your test results.    The results of your liver scans are attached and I recommend you visit with our hepatology providers. I placed a referral for this today and you can call [phone] to schedule this appointment. I also recommend you keep your 3/12/2025 follow up appointment with me to go over all your results     If you have any questions or concerns, please call the clinic at the number listed above.       Sincerely,      Dr. Gage

## 2025-02-18 NOTE — TELEPHONE ENCOUNTER
Controlled substance refill request notes    Refill request received for: Methylphenidate Er 36 Mg Tab  Last prescription: Methylphenidate Er 36 Mg Tab, 64 tab, 30 day supply  [place]  data reviewed 02/18/25:  Medication last refill: 60 tab, 30 day supply, filled/sold to patient on 01/24/2025  Pended order: Methylphenidate Er 36 Mg Tab, 64 tab, 30 day supply, fill on or after 02/23/2025     Primary care provider: Layo Gage  Last office visit this department: 1/22/2025  Last virtual visit this department: Visit date not found  Next appointment with PCP: 03/12/2025    Refill request forwarded to provider for review.     Tammy HOU LPN  [facility]

## 2025-02-18 NOTE — TELEPHONE ENCOUNTER
Patient confirmed scheduled appointment:  Date: 3/12/2025  Time: 700am  Visit type: In person  Provider: Dr. Gage  Location: [address] [place] [address]  Testing/imaging: -  Additional notes: patient confirmed new date/time

## 2025-02-20 RX ORDER — GUANFACINE 2 MG/1
2 TABLET, EXTENDED RELEASE ORAL AT BEDTIME
Qty: 90 TABLET | Refills: 1 | Status: SHIPPED | OUTPATIENT
Start: 2025-02-20

## 2025-02-20 NOTE — TELEPHONE ENCOUNTER
Last Written Prescription: GUANFACINE HCL ER 2 MG TABLET      guanFACINE (INTUNIV) 2 MG TB24 24 hr tablet 90 tablet 1 8/21/2024 -- No   Sig - Route: Take 1 tablet (2 mg) by mouth at bedtime. - Oral   REVIEWED- no recent changes in dosage  ----------------------   Last Visit Date: 10-30-24  Future Visit Date: 3-4-25  ----------------------      [x]  Refill decision: Medication refilled per  Medication Refill in Ambulatory Care  policy.           Request from pharmacy:  Requested Prescriptions   Pending Prescriptions Disp Refills    guanFACINE (INTUNIV) 2 MG TB24 24 hr tablet [Pharmacy Med Name: GUANFACINE HCL ER 2 MG TABLET] 90 tablet 1     Sig: TAKE 1 TABLET BY MOUTH AT BEDTIME       Attention Deficit/Hyperactivity Disorder (ADHD) Agents Protocol Failed - 2/20/2025  7:43 AM        Failed - RN Chart Review: If there has been a dose change or started on the medication since the previous refill, do not authorize. Send refill request to the provider.     Please review patient refills to confirm   This refill request isn't the 1st refill following initial prescription   OR   This refill request is not the 1st refill following a dose change.  If either of the above 2 are TRUE, patient must have had a recent visit within the past 30 days with the authorizing provider or a provider within his/her clinic AND specialty.           Passed - Most recent blood pressure under 140/90 in past 12 months     BP Readings from Last 3 Encounters:   10/30/24 139/87   06/10/24 130/83   05/23/24 125/82       No data recorded            Passed - Patient is age 6 or older        Passed - Medication is active on med list and the sig matches. RN to manually verify dose and sig if red X/fail.     If the protocol passes (green check), you do not need to verify med dose and sig.    A prescription matches if they are the same clinical intention.    For Example: once daily and every morning are the same.    For all fails (red x), verify dose and  sig.    If the refill does match what is on file, the RN can still proceed to approve the refill request.     If they do not match, route to the appropriate provider.             Passed - Recent (6 mo) or future (90 days)  visit within the authorizing provider's specialty     The patient must have completed an in-person or virtual visit within the past 6 months or has a future visit scheduled within the next 90 days with the authorizing provider s specialty.  Urgent care and e-visits do not quality as an office visit for this protocol.          Passed - Medicationindicatedfor associated diagnosis     The medication is prescribed for one or more of the following conditions:    Attention deficit hyperactive disorder   Attention deficit hyperactive disorder - Social phobia

## 2025-03-02 NOTE — PROGRESS NOTES
Patient: Miguel Angel Hernandez   YOB: 1995   Medical Record Number: [id number]     ORDERING PROVIDER: Layo Gage MD     EXAMINATION:     Liver FibroScan     DATE OF EXAM:   2/18/25     INDICATION:    Liver fibrosis assessment     HISTORY:     MASH/MASLD     A series of at least 10 Vibration Controlled Transient Elastography (VCTE) measurements were performed by placing the XL probe over the center of the liver parenchyma and mechanically inducing a 50 Hertz shear wave.     Each resulting VCTE measurement was analyzed to determine shear wave propagation speed and calculate the equivalent liver stiffness.     All measurements were reviewed by the  and physician for technical accuracy. Data variability across the acquired measurements was quantified with IQR/Median Percentage.     FINDINGS:     The median liver stiffness was 8.4 kPa with IQR/Median percentage of 14 %.     The measure CAP ultrasound attenuation rate value was 364 dB/m.     IMPRESSION:       1. Estimated liver fibrosis is Stage 2   2. Steatosis Grade S3   3. Results likely falsely elevated due to increased skin-to-capsule distance. Consider alternative modality for fibrosis assessment or repeat FibroScan after period of weight loss.     The results of the FibroScan examination were assessed by taking into account the quality of the measurement thumbnails, number of measurements, and IQR/Median ratio. I have personally reviewed the examination and initial interpretation, and I agree with the findings.      Delaney Cooley PA-C     Non-billable documentation. Refer to FibroScan imaging results.

## 2025-03-10 ENCOUNTER — TRANSFERRED RECORDS (OUTPATIENT)
Dept: HEALTH INFORMATION MANAGEMENT | Facility: CLINIC | Age: 30
End: 2025-03-10

## 2025-03-10 ENCOUNTER — APPOINTMENT (OUTPATIENT)
Dept: LAB | Facility: CLINIC | Age: 30
End: 2025-03-10
Payer: COMMERCIAL

## 2025-03-10 ENCOUNTER — DOCUMENTATION ONLY (OUTPATIENT)
Dept: LAB | Facility: CLINIC | Age: 30
End: 2025-03-10

## 2025-03-10 DIAGNOSIS — K76.0 NAFLD (NONALCOHOLIC FATTY LIVER DISEASE): Primary | ICD-10-CM

## 2025-03-10 NOTE — PROGRESS NOTES
Patient has a lab appointment today, requesting orders from Dr. Hugo, with no orders.    Please add orders, if needed.    Thank you.

## 2025-03-12 ENCOUNTER — OFFICE VISIT (OUTPATIENT)
Dept: INTERNAL MEDICINE | Facility: CLINIC | Age: 30
End: 2025-03-12
Payer: COMMERCIAL

## 2025-03-12 ENCOUNTER — LAB (OUTPATIENT)
Dept: LAB | Facility: CLINIC | Age: 30
End: 2025-03-12
Payer: COMMERCIAL

## 2025-03-12 VITALS
WEIGHT: 252.5 LBS | BODY MASS INDEX: 37.29 KG/M2 | OXYGEN SATURATION: 97 % | DIASTOLIC BLOOD PRESSURE: 74 MMHG | HEART RATE: 91 BPM | SYSTOLIC BLOOD PRESSURE: 120 MMHG

## 2025-03-12 DIAGNOSIS — K76.0 HEPATIC STEATOSIS: Primary | ICD-10-CM

## 2025-03-12 DIAGNOSIS — K76.0 NAFLD (NONALCOHOLIC FATTY LIVER DISEASE): ICD-10-CM

## 2025-03-12 LAB
ALBUMIN SERPL BCG-MCNC: 4.2 G/DL (ref 3.5–5.2)
ALP SERPL-CCNC: 90 U/L (ref 40–150)
ALT SERPL W P-5'-P-CCNC: 66 U/L (ref 0–70)
ANION GAP SERPL CALCULATED.3IONS-SCNC: 9 MMOL/L (ref 7–15)
AST SERPL W P-5'-P-CCNC: 41 U/L (ref 0–45)
BILIRUB DIRECT SERPL-MCNC: 0.1 MG/DL (ref 0–0.3)
BILIRUB SERPL-MCNC: 0.2 MG/DL
BUN SERPL-MCNC: 17 MG/DL (ref 6–20)
CALCIUM SERPL-MCNC: 8.9 MG/DL (ref 8.8–10.4)
CHLORIDE SERPL-SCNC: 110 MMOL/L (ref 98–107)
CREAT SERPL-MCNC: 0.98 MG/DL (ref 0.67–1.17)
EGFRCR SERPLBLD CKD-EPI 2021: >90 ML/MIN/1.73M2
ERYTHROCYTE [DISTWIDTH] IN BLOOD BY AUTOMATED COUNT: 12.7 % (ref 10–15)
GLUCOSE SERPL-MCNC: 127 MG/DL (ref 70–99)
HCO3 SERPL-SCNC: 25 MMOL/L (ref 22–29)
HCT VFR BLD AUTO: 44.7 % (ref 40–53)
HGB BLD-MCNC: 15.5 G/DL (ref 13.3–17.7)
INR PPP: 0.84 (ref 0.85–1.15)
MCH RBC QN AUTO: 29.4 PG (ref 26.5–33)
MCHC RBC AUTO-ENTMCNC: 34.7 G/DL (ref 31.5–36.5)
MCV RBC AUTO: 85 FL (ref 78–100)
PLATELET # BLD AUTO: 242 10E3/UL (ref 150–450)
POTASSIUM SERPL-SCNC: 4.1 MMOL/L (ref 3.4–5.3)
PROT SERPL-MCNC: 6.5 G/DL (ref 6.4–8.3)
RBC # BLD AUTO: 5.27 10E6/UL (ref 4.4–5.9)
SODIUM SERPL-SCNC: 144 MMOL/L (ref 135–145)
WBC # BLD AUTO: 7.5 10E3/UL (ref 4–11)

## 2025-03-12 PROCEDURE — 99215 OFFICE O/P EST HI 40 MIN: CPT | Performed by: INTERNAL MEDICINE

## 2025-03-12 PROCEDURE — 85027 COMPLETE CBC AUTOMATED: CPT | Performed by: PATHOLOGY

## 2025-03-12 PROCEDURE — 3078F DIAST BP <80 MM HG: CPT | Performed by: INTERNAL MEDICINE

## 2025-03-12 PROCEDURE — 85610 PROTHROMBIN TIME: CPT | Performed by: PATHOLOGY

## 2025-03-12 PROCEDURE — 82248 BILIRUBIN DIRECT: CPT | Performed by: PATHOLOGY

## 2025-03-12 PROCEDURE — 3074F SYST BP LT 130 MM HG: CPT | Performed by: INTERNAL MEDICINE

## 2025-03-12 PROCEDURE — 36415 COLL VENOUS BLD VENIPUNCTURE: CPT | Performed by: PATHOLOGY

## 2025-03-12 PROCEDURE — 80053 COMPREHEN METABOLIC PANEL: CPT | Performed by: PATHOLOGY

## 2025-03-12 NOTE — PROGRESS NOTES
Miguel Angel Hernandez is a 29 year old male that presents in clinic today for the following:     Chief Complaint   Patient presents with    Follow Up     1. Review liver test results   2. B/l impacted cerumen      The patient's allergies and medications were reviewed. The patient's vitals were obtained without incident.     [place], [place]   Primary Care Clinic

## 2025-03-13 ENCOUNTER — TRANSFERRED RECORDS (OUTPATIENT)
Dept: HEALTH INFORMATION MANAGEMENT | Facility: CLINIC | Age: 30
End: 2025-03-13

## 2025-03-15 ENCOUNTER — MYC REFILL (OUTPATIENT)
Dept: INTERNAL MEDICINE | Facility: CLINIC | Age: 30
End: 2025-03-15
Payer: COMMERCIAL

## 2025-03-15 DIAGNOSIS — F90.9 ATTENTION DEFICIT HYPERACTIVITY DISORDER (ADHD), UNSPECIFIED ADHD TYPE: ICD-10-CM

## 2025-03-17 RX ORDER — METHYLPHENIDATE HYDROCHLORIDE 36 MG/1
72 TABLET ORAL EVERY MORNING
Qty: 64 TABLET | Refills: 0 | Status: SHIPPED | OUTPATIENT
Start: 2025-03-17

## 2025-03-18 ENCOUNTER — VIRTUAL VISIT (OUTPATIENT)
Dept: DERMATOLOGY | Facility: CLINIC | Age: 30
End: 2025-03-18
Payer: COMMERCIAL

## 2025-03-18 DIAGNOSIS — L73.2 HIDRADENITIS SUPPURATIVA: ICD-10-CM

## 2025-03-18 DIAGNOSIS — L81.9 POST-INFLAMMATORY PIGMENTARY CHANGES: Primary | ICD-10-CM

## 2025-03-18 DIAGNOSIS — L70.0 ACNE VULGARIS: ICD-10-CM

## 2025-03-18 ASSESSMENT — PAIN SCALES - GENERAL: PAINLEVEL_OUTOF10: NO PAIN (0)

## 2025-03-18 NOTE — LETTER
3/18/2025       RE: Miguel Angel Hernandez  [address] [facility] [address]     Dear Colleague,    Thank you for referring your patient, Miguel Angel Hernandez, to the [facility] [place] at [facility]. Please see a copy of my visit note below.    Telemedicine Visit: The patient's condition can be safely assessed and treated via synchronous audio and visual telemedicine encounter.      Reason for Telemedicine Visit: Patient unable to travel    Originating Site (Patient Location): Patient's home      Distant Location (provider location):  On-site    Consent:  The patient/guardian has verbally consented to: the potential risks and benefits of telemedicine (video visit) versus in person care; bill my insurance or make self-payment for services provided; and responsibility for payment of non-covered services.     Mode of Communication:  Video Conference via [employer]    As the provider I attest to compliance with applicable laws and regulations related to telemedicine.    Encounter Date: Mar 18, 2025    Dermatology Problem List:  # Acne/folliculitis on chest and back with possible HS ([place] Stage I) in groin  - Current txt: isotretinoin 20 mg daily (started 10/15/24)  - CMP and lipids ordered 10/15/24  - Prior txt: BPO wash, clinda lotion, differin gel, doxycyline (didn't tolerate 2/2 GI upset), Rx'd minocycline but never took this  - 20/40/40/60/60  - Cumulative dose 6600mg (03/18/25)  - Goal dose 25,000     # Tinea pedis vs erythrasma   - ketoconazole cream BID  Major PMHx  #DVT    Major PMHx  -   ______________________________________    Impression/Plan:  Lenadr was seen today for acne.    Diagnoses and all orders for this visit:    Post-inflammatory pigmentary changes  Acne vulgaris (Continuing focal point for medical care services related to serious/complex condition)  Hidradenitis suppurativa  - improving w/ less flares  - requests  decrease to 40mg  - LFTs normalized         Follow-up in 1 mo.       Staff Involved:  Staff Only    Yobany Mitchell MD   of Dermatology  Department of Dermatology  [facility]      CC:   Chief Complaint   Patient presents with     Acne     Lenard is here today for an Accutane recheck. He reports being sick and taking a break on the Accutane about 10 days ago. He says that his flares have improved since stopping the Accutane 10 days ago.       History of Present Illness:  Mr. Miguel Angel Hernandez is a 29 year old male who presents as a return patient.    Following up for accutane. Took a break on accutane. Flares are improving     Labs:      Physical exam:  Vitals: There were no vitals taken for this visit.  GEN: well developed, well-nourished, in no acute distress, in a pleasant mood.     SKIN: Frank phototype 1  - Telemedicine photographs reviewed (Date of images: 03/18/25. Image quality and interpretability: acceptable. Location of teledermatologist: [facility] [place].   - pigment alteration at sites of previous inflammation  - pink papules and pustules on face and back  - inflammatory lesions on inner thighs   - No other lesions of concern on areas examined.     Past Medical History:   Past Medical History:   Diagnosis Date     ADHD      Asthma      Dyslipidemia      Obesity      Past Surgical History:   Procedure Laterality Date     TONSILLECTOMY Bilateral     childhood       Social History:   reports that he has quit smoking. His smoking use included other. He has never used smokeless tobacco. He reports current alcohol use of about 2.0 standard drinks of alcohol per week. He reports that he does not currently use drugs.    Family History:  Family History   Problem Relation Age of Onset     Snoring Mother      Hypertension Mother      Diabetes Maternal Grandmother      Liver Disease No family hx of      Colon  Cancer No family hx of      Melanoma No family hx of      Skin Cancer No family hx of        Medications:  Current Outpatient Medications   Medication Sig Dispense Refill     albuterol (PROAIR HFA/PROVENTIL HFA/VENTOLIN HFA) 108 (90 Base) MCG/ACT inhaler Inhale 1-2 puffs into the lungs every 6 hours as needed for shortness of breath or wheezing. 18 g 0     azelaic acid (FINACIA) 15 % external gel Apply topically daily. 50 g 3     fluticasone (FLOVENT HFA) 110 MCG/ACT inhaler INHALE 2 PUFFS TWICE A DAY FOR TWO WEEKS AT ONSET OF URI. RINSE AFTER USE. 1 g 0     guanFACINE (INTUNIV) 2 MG TB24 24 hr tablet TAKE 1 TABLET BY MOUTH AT BEDTIME 90 tablet 1     ISOtretinoin (ABSORICA) 30 MG capsule Take 1 capsule (30 mg) by mouth 2 times daily. 60 capsule 0     ISOtretinoin (ACCUTANE) 20 MG capsule Take 20 mg daily with fatty meal 30 capsule 0     ISOtretinoin (ACCUTANE) 40 MG capsule Take 1 capsule (40 mg) by mouth daily. 30 capsule 0     ketoconazole (NIZORAL) 2 % external cream Apply to scaly areas between toes twice daily for at least 2 weeks 60 g 3     methylphenidate HCl ER, OSM, (CONCERTA) 36 MG CR tablet Take 2 tablets (72 mg) by mouth every morning. 64 tablet 0     pregabalin (LYRICA) 50 MG capsule Take 1 capsule (50 mg) by mouth 2 times daily 60 capsule 0     sildenafil (REVATIO) 20 MG tablet Take 1-2 tablets (20-40 mg) by mouth daily as needed (take 1 hour before intercourse as needed.) May increase up to 100mg dose ( 5 tablets) if needed.  Use lowest effective dose. 30 tablet 3     tretinoin (RETIN-A) 0.025 % external cream USE EVERY NIGHT 45 g 3     clindamycin (CLEOCIN-T) 1 % external gel Apply topically 2 times daily (Patient not taking: Reported on 3/18/2025) 100 mL 1     Allergies   Allergen Reactions     Seasonal Allergies                Again, thank you for allowing me to participate in the care of your patient.      Sincerely,    Yobany Mitchell MD

## 2025-03-18 NOTE — NURSING NOTE
Dermatology Rooming Note    Miguel Angel Hernandez's goals for this visit include:   Chief Complaint   Patient presents with    Acne     Lenard is here today for an Accutane recheck. He reports being sick and taking a break on the Accutane about 10 days ago. He says that his flares have improved since stopping the Accutane 10 days ago.     Aki Augustin, Visit Facilitator

## 2025-03-18 NOTE — PROGRESS NOTES
Telemedicine Visit: The patient's condition can be safely assessed and treated via synchronous audio and visual telemedicine encounter.      Reason for Telemedicine Visit: Patient unable to travel    Originating Site (Patient Location): Patient's home      Distant Location (provider location):  On-site    Consent:  The patient/guardian has verbally consented to: the potential risks and benefits of telemedicine (video visit) versus in person care; bill my insurance or make self-payment for services provided; and responsibility for payment of non-covered services.     Mode of Communication:  Video Conference via [employer]    As the provider I attest to compliance with applicable laws and regulations related to telemedicine.    Encounter Date: Mar 18, 2025    Dermatology Problem List:  # Acne/folliculitis on chest and back with possible HS ([place] Stage I) in groin  - Current txt: isotretinoin 20 mg daily (started 10/15/24)  - CMP and lipids ordered 10/15/24  - Prior txt: BPO wash, clinda lotion, differin gel, doxycyline (didn't tolerate 2/2 GI upset), Rx'd minocycline but never took this  - 20/40/40/60/60  - Cumulative dose 6600mg (03/18/25)  - Goal dose 25,000     # Tinea pedis vs erythrasma   - ketoconazole cream BID  Major PMHx  #DVT    Major PMHx  -   ______________________________________    Impression/Plan:  Lenard was seen today for acne.    Diagnoses and all orders for this visit:    Post-inflammatory pigmentary changes  Acne vulgaris (Continuing focal point for medical care services related to serious/complex condition)  Hidradenitis suppurativa  - improving w/ less flares  - requests decrease to 40mg  - LFTs normalized         Follow-up in 1 mo.       Staff Involved:  Staff Only    Yobany Mitchell MD   of Dermatology  Department of Dermatology  [facility]      CC:   Chief Complaint   Patient presents with    Acne     Lenard is here today for an Accutane  recheck. He reports being sick and taking a break on the Accutane about 10 days ago. He says that his flares have improved since stopping the Accutane 10 days ago.       History of Present Illness:  Mr. Miguel Angel Hernandez is a 29 year old male who presents as a return patient.    Following up for accutane. Took a break on accutane. Flares are improving     Labs:      Physical exam:  Vitals: There were no vitals taken for this visit.  GEN: well developed, well-nourished, in no acute distress, in a pleasant mood.     SKIN: Frank phototype 1  - Telemedicine photographs reviewed (Date of images: 03/18/25. Image quality and interpretability: acceptable. Location of teledermatologist: [facility] [place].   - pigment alteration at sites of previous inflammation  - pink papules and pustules on face and back  - inflammatory lesions on inner thighs   - No other lesions of concern on areas examined.     Past Medical History:   Past Medical History:   Diagnosis Date    ADHD     Asthma     Dyslipidemia     Obesity      Past Surgical History:   Procedure Laterality Date    TONSILLECTOMY Bilateral     childhood       Social History:   reports that he has quit smoking. His smoking use included other. He has never used smokeless tobacco. He reports current alcohol use of about 2.0 standard drinks of alcohol per week. He reports that he does not currently use drugs.    Family History:  Family History   Problem Relation Age of Onset    Snoring Mother     Hypertension Mother     Diabetes Maternal Grandmother     Liver Disease No family hx of     Colon Cancer No family hx of     Melanoma No family hx of     Skin Cancer No family hx of        Medications:  Current Outpatient Medications   Medication Sig Dispense Refill    albuterol (PROAIR HFA/PROVENTIL HFA/VENTOLIN HFA) 108 (90 Base) MCG/ACT inhaler Inhale 1-2 puffs into the lungs every 6 hours as needed for shortness of breath or  wheezing. 18 g 0    azelaic acid (FINACIA) 15 % external gel Apply topically daily. 50 g 3    fluticasone (FLOVENT HFA) 110 MCG/ACT inhaler INHALE 2 PUFFS TWICE A DAY FOR TWO WEEKS AT ONSET OF URI. RINSE AFTER USE. 1 g 0    guanFACINE (INTUNIV) 2 MG TB24 24 hr tablet TAKE 1 TABLET BY MOUTH AT BEDTIME 90 tablet 1    ISOtretinoin (ABSORICA) 30 MG capsule Take 1 capsule (30 mg) by mouth 2 times daily. 60 capsule 0    ISOtretinoin (ACCUTANE) 20 MG capsule Take 20 mg daily with fatty meal 30 capsule 0    ISOtretinoin (ACCUTANE) 40 MG capsule Take 1 capsule (40 mg) by mouth daily. 30 capsule 0    ketoconazole (NIZORAL) 2 % external cream Apply to scaly areas between toes twice daily for at least 2 weeks 60 g 3    methylphenidate HCl ER, OSM, (CONCERTA) 36 MG CR tablet Take 2 tablets (72 mg) by mouth every morning. 64 tablet 0    pregabalin (LYRICA) 50 MG capsule Take 1 capsule (50 mg) by mouth 2 times daily 60 capsule 0    sildenafil (REVATIO) 20 MG tablet Take 1-2 tablets (20-40 mg) by mouth daily as needed (take 1 hour before intercourse as needed.) May increase up to 100mg dose ( 5 tablets) if needed.  Use lowest effective dose. 30 tablet 3    tretinoin (RETIN-A) 0.025 % external cream USE EVERY NIGHT 45 g 3    clindamycin (CLEOCIN-T) 1 % external gel Apply topically 2 times daily (Patient not taking: Reported on 3/18/2025) 100 mL 1     Allergies   Allergen Reactions    Seasonal Allergies

## 2025-03-21 DIAGNOSIS — J45.909 ASTHMA: ICD-10-CM

## 2025-03-23 DIAGNOSIS — L70.0 ACNE VULGARIS: ICD-10-CM

## 2025-03-27 RX ORDER — FLUTICASONE PROPIONATE 110 UG/1
AEROSOL, METERED RESPIRATORY (INHALATION)
Qty: 12 G | Refills: 1 | Status: SHIPPED | OUTPATIENT
Start: 2025-03-27

## 2025-03-27 NOTE — TELEPHONE ENCOUNTER
Last Written Prescription:     fluticasone (FLOVENT HFA) 110 MCG/ACT inhaler 1 g 0 2/12/2025 -- No   Sig: INHALE 2 PUFFS TWICE A DAY FOR TWO WEEKS AT ONSET OF URI. RINSE AFTER USE.     ----------------------  Last Visit Date: 3/12/25  Future Visit Date: 4/4/25  ----------------------      Refill decision: Medication refilled per  Medication Refill in Ambulatory Care  policy.       Request from pharmacy:  Requested Prescriptions   Pending Prescriptions Disp Refills    fluticasone (FLOVENT HFA) 110 MCG/ACT inhaler [Pharmacy Med Name: FLUTICASONE PROP  MCG]       Sig: INHALE 2 PUFFS TWICE A DAY FOR TWO WEEKS AT ONSET OF URI. RINSE AFTER USE.       Inhaled Steroids Protocol Passed - 3/27/2025 10:06 AM        Passed - Patient is age 12 or older        Passed - Asthma control assessment score within normal limits in last 6 months     Please review ACT score.           Passed - Medication is active on med list and the sig matches. RN to manually verify dose and sig if red X/fail.     If the protocol passes (green check), you do not need to verify med dose and sig.    A prescription matches if they are the same clinical intention.    For Example: once daily and every morning are the same.    The protocol can not identify upper and lower case letters as matching and will fail.     For Example: Take 1 tablet (50 mg) by mouth daily     TAKE 1 TABLET (50 MG) BY MOUTH DAILY    For all fails (red x), verify dose and sig.    If the refill does match what is on file, the RN can still proceed to approve the refill request.       If they do not match, route to the appropriate provider.             Passed - Recent (6 mo) or future (90 days) visit within the authorizing provider's specialty     The patient must have completed an in-person or virtual visit within the past 6 months or has a future visit scheduled within the next 90 days with the authorizing provider s specialty.  Urgent care and e-visits do not quality as an  office visit for this protocol.          Passed - Medication indicated for associated diagnosis     Medication is associated with one or more of the following diagnoses:    Allergies   Asthma   COPD   Nasal Congestion   Nasal Polyps   Rhinitis   Cystic Fibrosis   Bronchiectasis

## 2025-03-28 NOTE — TELEPHONE ENCOUNTER
Last Written Prescription:  ISOtretinoin (ACCUTANE) 40 MG capsule 30 capsule 0 12/10/2024     ----------------------  Last Visit Date: 3/18/25  Future Visit Date: 4/15/25  ----------------------        Refill decision: Medication unable to be refilled by RN due to  Not on protocol   Requesting claravis  last ordered Accutane        Request from pharmacy:  Requested Prescriptions   Pending Prescriptions Disp Refills    CLARAVIS 40 MG capsule [Pharmacy Med Name: CLARAVIS 40 MG CAPSULE] 30 capsule 0     Sig: TAKE ONE CAPSULE DAILY - PRESCRIBER CONFIRMATION       There is no refill protocol information for this order

## 2025-03-29 ENCOUNTER — HEALTH MAINTENANCE LETTER (OUTPATIENT)
Age: 30
End: 2025-03-29

## 2025-03-29 RX ORDER — ISOTRETINOIN 40 MG/1
CAPSULE, LIQUID FILLED ORAL
Qty: 30 CAPSULE | Refills: 0 | Status: SHIPPED | OUTPATIENT
Start: 2025-03-29

## 2025-04-08 ENCOUNTER — TELEPHONE (OUTPATIENT)
Dept: OPHTHALMOLOGY | Facility: CLINIC | Age: 30
End: 2025-04-08
Payer: COMMERCIAL

## 2025-04-08 NOTE — TELEPHONE ENCOUNTER
Referral for Vision Changes. -Per Layo Gage MD     --    Message above received by triage.    Routine referral in system.    Triage team to reach out and review in more details referral/scheduling.    Yuan Bejarano RN 7:05 AM 04/08/25

## 2025-04-08 NOTE — TELEPHONE ENCOUNTER
Spoke to pt at 0858    Pt seen for right eye stye about a week ago and some improvement in stye    Some 'puffier' looking appearance in right eye vs left eye.    No eye pain  No vision changes    No swelling of tissue around eye.    Pt has not had recent eye exam    Pt will be back in [place] on Friday.    Scheduled exam with Dr. Barrera on Monday/April 14th at 0930 at [facility] location.    Pt aware of date/time/location/duration/hospital based clinic/parking ramp.    Encouraged warm compresses 4/day for 10 minutes til visit (at least 2/da).    Yuan Bejarano RN 9:05 AM 04/08/25

## 2025-04-15 ENCOUNTER — VIRTUAL VISIT (OUTPATIENT)
Dept: DERMATOLOGY | Facility: CLINIC | Age: 30
End: 2025-04-15
Payer: COMMERCIAL

## 2025-04-15 DIAGNOSIS — L73.2 HIDRADENITIS SUPPURATIVA: ICD-10-CM

## 2025-04-15 DIAGNOSIS — L70.0 ACNE VULGARIS: Primary | ICD-10-CM

## 2025-04-15 ASSESSMENT — PAIN SCALES - GENERAL: PAINLEVEL_OUTOF10: NO PAIN (0)

## 2025-04-15 NOTE — NURSING NOTE
Dermatology Rooming Note    Miguel Angel Hernandez's goals for this visit include:   Chief Complaint   Patient presents with    Accutane     Lenard reports that he has not used Accutane since his last visit due to not getting his refill in time before his trip. He reports that his skin has been fine overall with no significant changes. He did state that he had a significant flare up on his shoulders.     Aki Augustin, Visit Facilitator

## 2025-04-15 NOTE — PROGRESS NOTES
Virtual Visit Details    Type of service:  Telephone Visit   Phone call duration: 12 minutes   Originating Location (pt. Location): Home    Distant Location (provider location):  On-site  Telephone visit completed due to the patient did not consent to a video visit.      [facility] Dermatology Note    Encounter Date: Apr 15, 2025    Dermatology Problem List:  # Acne/folliculitis on chest and back with possible HS (Wiseman Stage I) in groin  - Current txt: isotretinoin 20 mg daily (started 10/15/24)  - CMP and lipids ordered 10/15/24  - Prior txt: BPO wash, clinda lotion, differin gel, doxycyline (didn't tolerate 2/2 GI upset), Rx'd minocycline but never took this  - 20/40/40/60/60  - Cumulative dose 6600mg (03/18/25)  - Goal dose 25,000     # Tinea pedis vs erythrasma   - ketoconazole cream BID  Major PMHx  #DVT     Major PMHx  -     Major PMHx  -   ______________________________________    Impression/Plan:  Lenard was seen today for accutane.    Diagnoses and all orders for this visit:    Acne vulgaris  Hidradenitis suppurativa  -Went on a trip to [place] and did not take his medicine with him therefore has not had any more Accutane since our last visit  -  previously prescribed prescription for 40 mg daily and continue dryness is tolerable  - Did experience a flare with the lapse in the isotretinoin  - Continue benzyl peroxide wash in the shower using Differin as needed    The longitudinal plan of care for the diagnosis(es)/condition(s) as documented were addressed during this visit. Due to the added complexity in care, I will continue to support Lenard in the subsequent management and with ongoing continuity of care for acne vulgaris.      Follow-up in 1 mo.       Staff Involved:  Staff Only    Yobany Mitchell MD   of Dermatology  Department of Dermatology  [facility]      CC:   Chief Complaint   Patient presents with    Accutaeduar Weinberg  reports that he has not used Accutane due to not getting his refill in time before his trip. He reports that his skin has been fine overall with no significant changes. He did state that he had a significant flare up on his shoulders.       History of Present Illness:  Mr. Miguel Angel Hernandez is a 29 year old male who presents as a return patient.    Doing well on accutane. Had a flare w/ missing meds due to trip. Dryness is tolerable. Using BPO in shower. Using diferin gel PRN    Labs:      Physical exam:  Vitals: There were no vitals taken for this visit.  GEN: well developed, well-nourished, in no acute distress, in a pleasant mood.     SKIN: Frank phototype   - Telemedicine photographs reviewed (Date of images: 04/15/25. Image quality and interpretability: acceptable. Location of teledermatologist: [facility] [place].   - pink papules and pustules on face and back  - pigment alteration at sites of previous inflammation  - No other lesions of concern on areas examined.     Past Medical History:   Past Medical History:   Diagnosis Date    ADHD     Asthma     Dyslipidemia     Obesity      Past Surgical History:   Procedure Laterality Date    TONSILLECTOMY Bilateral     childhood       Social History:   reports that he has quit smoking. His smoking use included other. He has never used smokeless tobacco. He reports current alcohol use of about 2.0 standard drinks of alcohol per week. He reports that he does not currently use drugs.    Family History:  Family History   Problem Relation Age of Onset    Snoring Mother     Hypertension Mother     Diabetes Maternal Grandmother     Liver Disease No family hx of     Colon Cancer No family hx of     Melanoma No family hx of     Skin Cancer No family hx of        Medications:  Current Outpatient Medications   Medication Sig Dispense Refill    albuterol (PROAIR HFA/PROVENTIL HFA/VENTOLIN HFA) 108 (90 Base) MCG/ACT inhaler  Inhale 1-2 puffs into the lungs every 6 hours as needed for shortness of breath or wheezing. 18 g 0    azelaic acid (FINACIA) 15 % external gel Apply topically daily. 50 g 3    CLARAVIS 40 MG capsule TAKE ONE CAPSULE DAILY - PRESCRIBER CONFIRMATION 30 capsule 0    clindamycin (CLEOCIN-T) 1 % external gel Apply topically 2 times daily 100 mL 1    fluticasone (FLOVENT HFA) 110 MCG/ACT inhaler INHALE 2 PUFFS TWICE A DAY FOR TWO WEEKS AT ONSET OF URI. RINSE AFTER USE. 12 g 1    guanFACINE (INTUNIV) 2 MG TB24 24 hr tablet TAKE 1 TABLET BY MOUTH AT BEDTIME 90 tablet 1    ketoconazole (NIZORAL) 2 % external cream Apply to scaly areas between toes twice daily for at least 2 weeks 60 g 3    methylphenidate HCl ER, OSM, (CONCERTA) 36 MG CR tablet Take 2 tablets (72 mg) by mouth every morning. 64 tablet 0    pregabalin (LYRICA) 50 MG capsule Take 1 capsule (50 mg) by mouth 2 times daily 60 capsule 0    sildenafil (REVATIO) 20 MG tablet Take 1-2 tablets (20-40 mg) by mouth daily as needed (take 1 hour before intercourse as needed.) May increase up to 100mg dose ( 5 tablets) if needed.  Use lowest effective dose. 30 tablet 3    tretinoin (RETIN-A) 0.025 % external cream USE EVERY NIGHT 45 g 3    ISOtretinoin (ABSORICA) 30 MG capsule Take 1 capsule (30 mg) by mouth 2 times daily. (Patient not taking: Reported on 4/15/2025) 60 capsule 0    ISOtretinoin (ACCUTANE) 20 MG capsule Take 20 mg daily with fatty meal (Patient not taking: Reported on 4/15/2025) 30 capsule 0     Allergies   Allergen Reactions    Seasonal Allergies

## 2025-04-15 NOTE — LETTER
4/15/2025       RE: Miguel Angel Hernandez  [address] [address]     Dear Colleague,    Thank you for referring your patient, Miguel Angel Hernandez, to the [facility] DERMATOLOGY CLINIC [place] at [facility]. Please see a copy of my visit note below.    Virtual Visit Details    Type of service:  Telephone Visit   Phone call duration: 12 minutes   Originating Location (pt. Location): Home    Distant Location (provider location):  On-site  Telephone visit completed due to the patient did not consent to a video visit.      [facility] Dermatology Note    Encounter Date: Apr 15, 2025    Dermatology Problem List:  # Acne/folliculitis on chest and back with possible HS (Wiseman Stage I) in groin  - Current txt: isotretinoin 20 mg daily (started 10/15/24)  - CMP and lipids ordered 10/15/24  - Prior txt: BPO wash, clinda lotion, differin gel, doxycyline (didn't tolerate 2/2 GI upset), Rx'd minocycline but never took this  - 20/40/40/60/60  - Cumulative dose 6600mg (03/18/25)  - Goal dose 25,000     # Tinea pedis vs erythrasma   - ketoconazole cream BID  Major PMHx  #DVT     Major PMHx  -     Major PMHx  -   ______________________________________    Impression/Plan:  Lenard was seen today for accutane.    Diagnoses and all orders for this visit:    Acne vulgaris  Hidradenitis suppurativa  -Went on a trip to [place] and did not take his medicine with him therefore has not had any more Accutane since our last visit  -  previously prescribed prescription for 40 mg daily and continue dryness is tolerable  - Did experience a flare with the lapse in the isotretinoin  - Continue benzyl peroxide wash in the shower using Differin as needed    The longitudinal plan of care for the diagnosis(es)/condition(s) as documented were addressed during this visit. Due to the added complexity in care, I will continue to support Lenard in the subsequent  management and with ongoing continuity of care for acne vulgaris.      Follow-up in 1 mo.       Staff Involved:  Staff Only    Yobany Mitchell MD   of Dermatology  Department of Dermatology  [facility]      CC:   Chief Complaint   Patient presents with     Accleslie Weinberg reports that he has not used Accutane due to not getting his refill in time before his trip. He reports that his skin has been fine overall with no significant changes. He did state that he had a significant flare up on his shoulders.       History of Present Illness:  Mr. Miguel Angel Hernandez is a 29 year old male who presents as a return patient.    Doing well on accutane. Had a flare w/ missing meds due to trip. Dryness is tolerable. Using BPO in shower. Using diferin gel PRN    Labs:      Physical exam:  Vitals: There were no vitals taken for this visit.  GEN: well developed, well-nourished, in no acute distress, in a pleasant mood.     SKIN: Frank phototype   - Telemedicine photographs reviewed (Date of images: 04/15/25. Image quality and interpretability: acceptable. Location of teledermatologist: [facility] [place].   - pink papules and pustules on face and back  - pigment alteration at sites of previous inflammation  - No other lesions of concern on areas examined.     Past Medical History:   Past Medical History:   Diagnosis Date     ADHD      Asthma      Dyslipidemia      Obesity      Past Surgical History:   Procedure Laterality Date     TONSILLECTOMY Bilateral     childhood       Social History:   reports that he has quit smoking. His smoking use included other. He has never used smokeless tobacco. He reports current alcohol use of about 2.0 standard drinks of alcohol per week. He reports that he does not currently use drugs.    Family History:  Family History   Problem Relation Age of Onset     Snoring Mother      Hypertension Mother       Diabetes Maternal Grandmother      Liver Disease No family hx of      Colon Cancer No family hx of      Melanoma No family hx of      Skin Cancer No family hx of        Medications:  Current Outpatient Medications   Medication Sig Dispense Refill     albuterol (PROAIR HFA/PROVENTIL HFA/VENTOLIN HFA) 108 (90 Base) MCG/ACT inhaler Inhale 1-2 puffs into the lungs every 6 hours as needed for shortness of breath or wheezing. 18 g 0     azelaic acid (FINACIA) 15 % external gel Apply topically daily. 50 g 3     CLARAVIS 40 MG capsule TAKE ONE CAPSULE DAILY - PRESCRIBER CONFIRMATION 30 capsule 0     clindamycin (CLEOCIN-T) 1 % external gel Apply topically 2 times daily 100 mL 1     fluticasone (FLOVENT HFA) 110 MCG/ACT inhaler INHALE 2 PUFFS TWICE A DAY FOR TWO WEEKS AT ONSET OF URI. RINSE AFTER USE. 12 g 1     guanFACINE (INTUNIV) 2 MG TB24 24 hr tablet TAKE 1 TABLET BY MOUTH AT BEDTIME 90 tablet 1     ketoconazole (NIZORAL) 2 % external cream Apply to scaly areas between toes twice daily for at least 2 weeks 60 g 3     methylphenidate HCl ER, OSM, (CONCERTA) 36 MG CR tablet Take 2 tablets (72 mg) by mouth every morning. 64 tablet 0     pregabalin (LYRICA) 50 MG capsule Take 1 capsule (50 mg) by mouth 2 times daily 60 capsule 0     sildenafil (REVATIO) 20 MG tablet Take 1-2 tablets (20-40 mg) by mouth daily as needed (take 1 hour before intercourse as needed.) May increase up to 100mg dose ( 5 tablets) if needed.  Use lowest effective dose. 30 tablet 3     tretinoin (RETIN-A) 0.025 % external cream USE EVERY NIGHT 45 g 3     ISOtretinoin (ABSORICA) 30 MG capsule Take 1 capsule (30 mg) by mouth 2 times daily. (Patient not taking: Reported on 4/15/2025) 60 capsule 0     ISOtretinoin (ACCUTANE) 20 MG capsule Take 20 mg daily with fatty meal (Patient not taking: Reported on 4/15/2025) 30 capsule 0     Allergies   Allergen Reactions     Seasonal Allergies                Again, thank you for allowing me to participate in  the care of your patient.      Sincerely,    Yobany Mitchell MD

## 2025-04-26 ENCOUNTER — MYC REFILL (OUTPATIENT)
Dept: INTERNAL MEDICINE | Facility: CLINIC | Age: 30
End: 2025-04-26
Payer: COMMERCIAL

## 2025-04-26 DIAGNOSIS — F90.9 ATTENTION DEFICIT HYPERACTIVITY DISORDER (ADHD), UNSPECIFIED ADHD TYPE: ICD-10-CM

## 2025-04-28 RX ORDER — METHYLPHENIDATE HYDROCHLORIDE 36 MG/1
72 TABLET ORAL EVERY MORNING
Qty: 64 TABLET | Refills: 0 | Status: SHIPPED | OUTPATIENT
Start: 2025-04-28

## 2025-05-05 ENCOUNTER — TRANSFERRED RECORDS (OUTPATIENT)
Dept: HEALTH INFORMATION MANAGEMENT | Facility: CLINIC | Age: 30
End: 2025-05-05
Payer: COMMERCIAL

## 2025-05-20 ENCOUNTER — VIRTUAL VISIT (OUTPATIENT)
Dept: DERMATOLOGY | Facility: CLINIC | Age: 30
End: 2025-05-20
Payer: COMMERCIAL

## 2025-05-20 DIAGNOSIS — L81.9 POST-INFLAMMATORY PIGMENTARY CHANGES: ICD-10-CM

## 2025-05-20 DIAGNOSIS — K76.0 NONALCOHOLIC HEPATOSTEATOSIS: ICD-10-CM

## 2025-05-20 DIAGNOSIS — L70.0 ACNE VULGARIS: Primary | ICD-10-CM

## 2025-05-20 RX ORDER — ISOTRETINOIN 40 MG/1
40 CAPSULE ORAL DAILY
Qty: 30 CAPSULE | Refills: 0 | Status: SHIPPED | OUTPATIENT
Start: 2025-05-20

## 2025-05-20 ASSESSMENT — PAIN SCALES - GENERAL: PAINLEVEL_OUTOF10: MILD PAIN (2)

## 2025-05-20 NOTE — NURSING NOTE
Dermatology Rooming Note    Miguel Angel Hernandez's goals for this visit include:   Chief Complaint   Patient presents with    Accutane     Accutane- no change, chapped lips, pimples on buttocks causing pain     Jnaiya Hernandez [place]

## 2025-05-20 NOTE — PROGRESS NOTES
Virtual Visit Details    Type of service:  Telephone Visit   Phone call duration: 13 minutes   Originating Location (pt. Location): Home    Distant Location (provider location):  On-site  Telephone visit completed due to the patient did not consent to a video visit.    [facility] Dermatology Note    Encounter Date: May 20, 2025    Dermatology Problem List:  # Acne/folliculitis on chest and back with possible HS (Wiseman Stage I) in groin  - Current txt: isotretinoin 20 mg daily (started 10/15/24)  - CMP and lipids ordered 10/15/24  - Prior txt: BPO wash, clinda lotion, differin gel, doxycyline (didn't tolerate 2/2 GI upset), Rx'd minocycline but never took this  - 20/40/40/60/60/40  - Cumulative dose 7800mg (05/20/25)  - Goal dose 25,000     # Tinea pedis vs erythrasma   - ketoconazole cream BID    Major PMHx  #DVT  ______________________________________    Impression/Plan:  Lenard was seen today for accutane.    Diagnoses and all orders for this visit:    Post-inflammatory pigmentary changes  Acne vulgaris  -     ISOtretinoin (CLARAVIS) 40 MG capsule; Take 1 capsule (40 mg) by mouth daily.  - continue daily  - restart topicals for body foliculitis     The longitudinal plan of care for the diagnosis(es)/condition(s) as documented were addressed during this visit. Due to the added complexity in care, I will continue to support Lenard in the subsequent management and with ongoing continuity of care for acne vulgaris.      Nonalcoholic hepatosteatosis  - follows w/ GI      Follow-up in 1 year.       Staff Involved:  Staff Only    Yobany Mitchell MD   of Dermatology  Department of Dermatology  [facility]      CC:   Chief Complaint   Patient presents with    Accutane     Accutane- no change, chapped lips, pimples on buttocks causing pain       History of Present Illness:  Mr. Miguel Angel Hernandez is a 29 year old male who presents as a return patient.    Doing  well. Pimples on buttocks causing pain. Dryness better on face. Happy w/ results.     Labs:      Physical exam:  Vitals: There were no vitals taken for this visit.  GEN: well developed, well-nourished, in no acute distress, in a pleasant mood.     SKIN: Frank phototype 3  - Telemedicine photographs reviewed (Date of images: 05/20/25. Image quality and interpretability: acceptable. Location of teledermatologist: [facility] [place].   - clear skin on face  - No other lesions of concern on areas examined.     Past Medical History:   Past Medical History:   Diagnosis Date    ADHD     Asthma     Dyslipidemia     Obesity      Past Surgical History:   Procedure Laterality Date    TONSILLECTOMY Bilateral     childhood       Social History:   reports that he has quit smoking. His smoking use included other. He has never used smokeless tobacco. He reports current alcohol use of about 2.0 standard drinks of alcohol per week. He reports that he does not currently use drugs.    Family History:  Family History   Problem Relation Age of Onset    Snoring Mother     Hypertension Mother     Diabetes Maternal Grandmother     Liver Disease No family hx of     Colon Cancer No family hx of     Melanoma No family hx of     Skin Cancer No family hx of        Medications:  Current Outpatient Medications   Medication Sig Dispense Refill    albuterol (PROAIR HFA/PROVENTIL HFA/VENTOLIN HFA) 108 (90 Base) MCG/ACT inhaler Inhale 1-2 puffs into the lungs every 6 hours as needed for shortness of breath or wheezing. 18 g 0    clindamycin (CLEOCIN-T) 1 % external gel Apply topically 2 times daily 100 mL 1    fluticasone (FLOVENT HFA) 110 MCG/ACT inhaler INHALE 2 PUFFS TWICE A DAY FOR TWO WEEKS AT ONSET OF URI. RINSE AFTER USE. 12 g 1    ISOtretinoin (ABSORICA) 30 MG capsule Take 1 capsule (30 mg) by mouth 2 times daily. 60 capsule 0    ISOtretinoin (ACCUTANE) 20 MG capsule Take 20 mg daily with  fatty meal 30 capsule 0    ISOtretinoin (CLARAVIS) 40 MG capsule Take 1 capsule (40 mg) by mouth daily. 30 capsule 0    ketoconazole (NIZORAL) 2 % external cream Apply to scaly areas between toes twice daily for at least 2 weeks 60 g 3    methylphenidate HCl ER, OSM, (CONCERTA) 36 MG CR tablet Take 2 tablets (72 mg) by mouth every morning. 64 tablet 0    pregabalin (LYRICA) 50 MG capsule Take 1 capsule (50 mg) by mouth 2 times daily 60 capsule 0    sildenafil (REVATIO) 20 MG tablet Take 1-2 tablets (20-40 mg) by mouth daily as needed (take 1 hour before intercourse as needed.) May increase up to 100mg dose ( 5 tablets) if needed.  Use lowest effective dose. 30 tablet 3    azelaic acid (FINACIA) 15 % external gel Apply topically daily. (Patient not taking: Reported on 5/20/2025) 50 g 3    guanFACINE (INTUNIV) 2 MG TB24 24 hr tablet TAKE 1 TABLET BY MOUTH AT BEDTIME (Patient not taking: Reported on 5/20/2025) 90 tablet 1    tretinoin (RETIN-A) 0.025 % external cream USE EVERY NIGHT (Patient not taking: Reported on 5/20/2025) 45 g 3     Allergies   Allergen Reactions    Seasonal Allergies

## 2025-05-20 NOTE — LETTER
5/20/2025       RE: Miguel Angel Hernandez  [address] [address]     Dear Colleague,    Thank you for referring your patient, Miguel Angel Hernandez, to the [facility] DERMATOLOGY CLINIC [place] at [facility]. Please see a copy of my visit note below.    Virtual Visit Details    Type of service:  Telephone Visit   Phone call duration: 13 minutes   Originating Location (pt. Location): Home    Distant Location (provider location):  On-site  Telephone visit completed due to the patient did not consent to a video visit.    [facility] Dermatology Note    Encounter Date: May 20, 2025    Dermatology Problem List:  # Acne/folliculitis on chest and back with possible HS (Wiseman Stage I) in groin  - Current txt: isotretinoin 20 mg daily (started 10/15/24)  - CMP and lipids ordered 10/15/24  - Prior txt: BPO wash, clinda lotion, differin gel, doxycyline (didn't tolerate 2/2 GI upset), Rx'd minocycline but never took this  - 20/40/40/60/60/40  - Cumulative dose 7800mg (05/20/25)  - Goal dose 25,000     # Tinea pedis vs erythrasma   - ketoconazole cream BID    Major PMHx  #DVT  ______________________________________    Impression/Plan:  Lenard was seen today for accutane.    Diagnoses and all orders for this visit:    Post-inflammatory pigmentary changes  Acne vulgaris  -     ISOtretinoin (CLARAVIS) 40 MG capsule; Take 1 capsule (40 mg) by mouth daily.  - continue daily  - restart topicals for body foliculitis     The longitudinal plan of care for the diagnosis(es)/condition(s) as documented were addressed during this visit. Due to the added complexity in care, I will continue to support Lenard in the subsequent management and with ongoing continuity of care for acne vulgaris.      Nonalcoholic hepatosteatosis  - follows w/ GI      Follow-up in 1 year.       Staff Involved:  Staff Only    Yobany Mitchell MD   of  Dermatology  Department of Dermatology  [facility]      CC:   Chief Complaint   Patient presents with     Accutane     Accutane- no change, chapped lips, pimples on buttocks causing pain       History of Present Illness:  Mr. Miguel Angel Hernandez is a 29 year old male who presents as a return patient.    Doing well. Pimples on buttocks causing pain. Dryness better on face. Happy w/ results.     Labs:      Physical exam:  Vitals: There were no vitals taken for this visit.  GEN: well developed, well-nourished, in no acute distress, in a pleasant mood.     SKIN: Frank phototype 3  - Telemedicine photographs reviewed (Date of images: 05/20/25. Image quality and interpretability: acceptable. Location of teledermatologist: [facility] [place].   - clear skin on face  - No other lesions of concern on areas examined.     Past Medical History:   Past Medical History:   Diagnosis Date     ADHD      Asthma      Dyslipidemia      Obesity      Past Surgical History:   Procedure Laterality Date     TONSILLECTOMY Bilateral     childhood       Social History:   reports that he has quit smoking. His smoking use included other. He has never used smokeless tobacco. He reports current alcohol use of about 2.0 standard drinks of alcohol per week. He reports that he does not currently use drugs.    Family History:  Family History   Problem Relation Age of Onset     Snoring Mother      Hypertension Mother      Diabetes Maternal Grandmother      Liver Disease No family hx of      Colon Cancer No family hx of      Melanoma No family hx of      Skin Cancer No family hx of        Medications:  Current Outpatient Medications   Medication Sig Dispense Refill     albuterol (PROAIR HFA/PROVENTIL HFA/VENTOLIN HFA) 108 (90 Base) MCG/ACT inhaler Inhale 1-2 puffs into the lungs every 6 hours as needed for shortness of breath or wheezing. 18 g 0     clindamycin  (CLEOCIN-T) 1 % external gel Apply topically 2 times daily 100 mL 1     fluticasone (FLOVENT HFA) 110 MCG/ACT inhaler INHALE 2 PUFFS TWICE A DAY FOR TWO WEEKS AT ONSET OF URI. RINSE AFTER USE. 12 g 1     ISOtretinoin (ABSORICA) 30 MG capsule Take 1 capsule (30 mg) by mouth 2 times daily. 60 capsule 0     ISOtretinoin (ACCUTANE) 20 MG capsule Take 20 mg daily with fatty meal 30 capsule 0     ISOtretinoin (CLARAVIS) 40 MG capsule Take 1 capsule (40 mg) by mouth daily. 30 capsule 0     ketoconazole (NIZORAL) 2 % external cream Apply to scaly areas between toes twice daily for at least 2 weeks 60 g 3     methylphenidate HCl ER, OSM, (CONCERTA) 36 MG CR tablet Take 2 tablets (72 mg) by mouth every morning. 64 tablet 0     pregabalin (LYRICA) 50 MG capsule Take 1 capsule (50 mg) by mouth 2 times daily 60 capsule 0     sildenafil (REVATIO) 20 MG tablet Take 1-2 tablets (20-40 mg) by mouth daily as needed (take 1 hour before intercourse as needed.) May increase up to 100mg dose ( 5 tablets) if needed.  Use lowest effective dose. 30 tablet 3     azelaic acid (FINACIA) 15 % external gel Apply topically daily. (Patient not taking: Reported on 5/20/2025) 50 g 3     guanFACINE (INTUNIV) 2 MG TB24 24 hr tablet TAKE 1 TABLET BY MOUTH AT BEDTIME (Patient not taking: Reported on 5/20/2025) 90 tablet 1     tretinoin (RETIN-A) 0.025 % external cream USE EVERY NIGHT (Patient not taking: Reported on 5/20/2025) 45 g 3     Allergies   Allergen Reactions     Seasonal Allergies                Again, thank you for allowing me to participate in the care of your patient.      Sincerely,    Yobany Mitchell MD

## 2025-05-24 ENCOUNTER — MYC REFILL (OUTPATIENT)
Dept: INTERNAL MEDICINE | Facility: CLINIC | Age: 30
End: 2025-05-24
Payer: COMMERCIAL

## 2025-05-24 DIAGNOSIS — F90.9 ATTENTION DEFICIT HYPERACTIVITY DISORDER (ADHD), UNSPECIFIED ADHD TYPE: ICD-10-CM

## 2025-05-27 RX ORDER — METHYLPHENIDATE HYDROCHLORIDE 36 MG/1
72 TABLET ORAL EVERY MORNING
Qty: 64 TABLET | Refills: 0 | Status: SHIPPED | OUTPATIENT
Start: 2025-05-28

## 2025-05-27 NOTE — TELEPHONE ENCOUNTER
Controlled substance refill request notes    Refill request received for: Methylphenidate Er 36 Mg Tab  [place]  data reviewed 05/27/25:  Medication last refill: qty 60, 30 day supply, filled/sold to patient on 04/28/2025  Pended order: Methylphenidate Er 36 Mg Tab, qty 60, 30 day supply, fill on or after 05/28/2025     Primary care provider: Layo Gage  Last office visit with this department: 3/12/2025  Last virtual visit with this department: 4/4/2025  Next appointment with PCP: None     Refill request forwarded to provider for review.     Tammy HOU LPN  [facility]    
Alert-The patient is alert, awake and responds to voice. The patient is oriented to time, place, and person. The triage nurse is able to obtain subjective information.

## 2025-06-17 ENCOUNTER — VIRTUAL VISIT (OUTPATIENT)
Dept: DERMATOLOGY | Facility: CLINIC | Age: 30
End: 2025-06-17
Payer: COMMERCIAL

## 2025-06-17 ENCOUNTER — MYC MEDICAL ADVICE (OUTPATIENT)
Dept: DERMATOLOGY | Facility: CLINIC | Age: 30
End: 2025-06-17

## 2025-06-17 DIAGNOSIS — L81.9 POST-INFLAMMATORY PIGMENTARY CHANGES: Primary | ICD-10-CM

## 2025-06-17 DIAGNOSIS — L70.0 ACNE VULGARIS: ICD-10-CM

## 2025-06-17 RX ORDER — ISOTRETINOIN 40 MG/1
40 CAPSULE ORAL DAILY
Qty: 30 CAPSULE | Refills: 0 | Status: SHIPPED | OUTPATIENT
Start: 2025-06-17

## 2025-06-17 ASSESSMENT — PAIN SCALES - GENERAL: PAINLEVEL_OUTOF10: NO PAIN (0)

## 2025-06-17 NOTE — LETTER
6/17/2025       RE: Miguel Angel Hernandez  [address] [facility] [address]     Dear Colleague,    Thank you for referring your patient, Miguel Angel Hernandez, to the [facility] [place] at [facility]. Please see a copy of my visit note below.    Virtual Visit Details    Type of service:  Telephone Visit   Phone call duration: 13 minutes   Originating Location (pt. Location): Home    Distant Location (provider location):  On-site  Telephone visit completed due to the patient did not consent to a video visit.    [facility] Dermatology Note    Encounter Date: Jun 17, 2025    Dermatology Problem List:  # Acne/folliculitis on chest and back with possible HS (Wiseman Stage I) in groin  - Current txt: isotretinoin 20 mg daily (started 10/15/24)  - CMP and lipids ordered 10/15/24  - Prior txt: BPO wash, clinda lotion, differin gel, doxycyline (didn't tolerate 2/2 GI upset), Rx'd minocycline but never took this  - 20/40/40/60/60/40/  - Cumulative dose 7800mg (06/17/25)  - Goal dose 25,000     # Tinea pedis vs erythrasma   - ketoconazole cream BID    Major PMHx  #DVT  ______________________________________    Impression/Plan:  Lenard was seen today for accutane.    Diagnoses and all orders for this visit:    Post-inflammatory pigmentary changes  Acne vulgaris  -     ISOtretinoin (CLARAVIS) 40 MG capsule; Take 1 capsule (40 mg) by mouth daily.  - never picked last mo supply up  - wants to restart now  - continue BPO, clinda gel and diferin gel PRN per pt preference    The longitudinal plan of care for the diagnosis(es)/condition(s) as documented were addressed during this visit. Due to the added complexity in care, I will continue to support Lenard in the subsequent management and with ongoing continuity of care for acne vulgaris.      Nonalcoholic hepatosteatosis  - follows w/ GI      Follow-up in 1 year.       Staff Involved:  Staff  Only    Yobany Mitchell MD   of Dermatology  Department of Dermatology  [facility]      CC:   Chief Complaint   Patient presents with     Derm Problem     Hasn't been taking accutane since last month       History of Present Illness:  Mr. Miguel Angel Hernandez is a 29 year old male who presents as a return patient.    Doing well. Did not take isotretinoin because it wasn't ready. Using clinda and diferin gel 1-2x a month for a few times a day for flares. Also uses BPO wash     Labs:      Physical exam:  Vitals: There were no vitals taken for this visit.  GEN: well developed, well-nourished, in no acute distress, in a pleasant mood.     SKIN: Frank phototype 3  - Telemedicine photographs reviewed (Date of images: 05/20/25. Image quality and interpretability: acceptable. Location of teledermatologist: [facility] [place].   - clear skin on face axilla and waist  - No other lesions of concern on areas examined.     Past Medical History:   Past Medical History:   Diagnosis Date     ADHD      Asthma      Dyslipidemia      Obesity      Past Surgical History:   Procedure Laterality Date     TONSILLECTOMY Bilateral     childhood       Social History:   reports that he has quit smoking. His smoking use included other. He has never used smokeless tobacco. He reports current alcohol use of about 2.0 standard drinks of alcohol per week. He reports that he does not currently use drugs.    Family History:  Family History   Problem Relation Age of Onset     Snoring Mother      Hypertension Mother      Diabetes Maternal Grandmother      Liver Disease No family hx of      Colon Cancer No family hx of      Melanoma No family hx of      Skin Cancer No family hx of        Medications:  Current Outpatient Medications   Medication Sig Dispense Refill     azelaic acid (FINACIA) 15 % external gel Apply topically daily. 50 g 3     clindamycin  (CLEOCIN-T) 1 % external gel Apply topically 2 times daily 100 mL 1     fluticasone (FLOVENT HFA) 110 MCG/ACT inhaler INHALE 2 PUFFS TWICE A DAY FOR TWO WEEKS AT ONSET OF URI. RINSE AFTER USE. 12 g 1     ISOtretinoin (ABSORICA) 30 MG capsule Take 1 capsule (30 mg) by mouth 2 times daily. 60 capsule 0     ISOtretinoin (ACCUTANE) 20 MG capsule Take 20 mg daily with fatty meal 30 capsule 0     ISOtretinoin (CLARAVIS) 40 MG capsule Take 1 capsule (40 mg) by mouth daily. 30 capsule 0     ketoconazole (NIZORAL) 2 % external cream Apply to scaly areas between toes twice daily for at least 2 weeks 60 g 3     methylphenidate HCl ER, OSM, (CONCERTA) 36 MG CR tablet Take 2 tablets (72 mg) by mouth every morning. 64 tablet 0     pregabalin (LYRICA) 50 MG capsule Take 1 capsule (50 mg) by mouth 2 times daily 60 capsule 0     sildenafil (REVATIO) 20 MG tablet Take 1-2 tablets (20-40 mg) by mouth daily as needed (take 1 hour before intercourse as needed.) May increase up to 100mg dose ( 5 tablets) if needed.  Use lowest effective dose. 30 tablet 3     albuterol (PROAIR HFA/PROVENTIL HFA/VENTOLIN HFA) 108 (90 Base) MCG/ACT inhaler Inhale 1-2 puffs into the lungs every 6 hours as needed for shortness of breath or wheezing. (Patient not taking: Reported on 6/17/2025) 18 g 0     guanFACINE (INTUNIV) 2 MG TB24 24 hr tablet TAKE 1 TABLET BY MOUTH AT BEDTIME (Patient not taking: Reported on 6/17/2025) 90 tablet 1     tretinoin (RETIN-A) 0.025 % external cream USE EVERY NIGHT (Patient not taking: Reported on 6/17/2025) 45 g 3     Allergies   Allergen Reactions     Seasonal Allergies                Again, thank you for allowing me to participate in the care of your patient.      Sincerely,    Yobany Mitchell MD

## 2025-06-17 NOTE — PROGRESS NOTES
Virtual Visit Details    Type of service:  Telephone Visit   Phone call duration: 13 minutes   Originating Location (pt. Location): Home    Distant Location (provider location):  On-site  Telephone visit completed due to the patient did not consent to a video visit.    [facility] Dermatology Note    Encounter Date: Jun 17, 2025    Dermatology Problem List:  # Acne/folliculitis on chest and back with possible HS (Wiseman Stage I) in groin  - Current txt: isotretinoin 20 mg daily (started 10/15/24)  - CMP and lipids ordered 10/15/24  - Prior txt: BPO wash, clinda lotion, differin gel, doxycyline (didn't tolerate 2/2 GI upset), Rx'd minocycline but never took this  - 20/40/40/60/60/40/  - Cumulative dose 7800mg (06/17/25)  - Goal dose 25,000     # Tinea pedis vs erythrasma   - ketoconazole cream BID    Major PMHx  #DVT  ______________________________________    Impression/Plan:  Lenard was seen today for accutane.    Diagnoses and all orders for this visit:    Post-inflammatory pigmentary changes  Acne vulgaris  -     ISOtretinoin (CLARAVIS) 40 MG capsule; Take 1 capsule (40 mg) by mouth daily.  - never picked last mo supply up  - wants to restart now  - continue BPO, clinda gel and diferin gel PRN per pt preference    The longitudinal plan of care for the diagnosis(es)/condition(s) as documented were addressed during this visit. Due to the added complexity in care, I will continue to support Lenard in the subsequent management and with ongoing continuity of care for acne vulgaris.      Nonalcoholic hepatosteatosis  - follows w/ GI      Follow-up in 1 year.       Staff Involved:  Staff Only    Yobany Mitchell MD   of Dermatology  Department of Dermatology  [facility]      CC:   Chief Complaint   Patient presents with    Derm Problem     Hasn't been taking accutane since last month       History of Present Illness:  Mr. Miguel Angel Hernandez is a 29 year old  male who presents as a return patient.    Doing well. Did not take isotretinoin because it wasn't ready. Using clinda and diferin gel 1-2x a month for a few times a day for flares. Also uses BPO wash     Labs:      Physical exam:  Vitals: There were no vitals taken for this visit.  GEN: well developed, well-nourished, in no acute distress, in a pleasant mood.     SKIN: Frank phototype 3  - Telemedicine photographs reviewed (Date of images: 05/20/25. Image quality and interpretability: acceptable. Location of teledermatologist: [facility] [place].   - clear skin on face axilla and waist  - No other lesions of concern on areas examined.     Past Medical History:   Past Medical History:   Diagnosis Date    ADHD     Asthma     Dyslipidemia     Obesity      Past Surgical History:   Procedure Laterality Date    TONSILLECTOMY Bilateral     childhood       Social History:   reports that he has quit smoking. His smoking use included other. He has never used smokeless tobacco. He reports current alcohol use of about 2.0 standard drinks of alcohol per week. He reports that he does not currently use drugs.    Family History:  Family History   Problem Relation Age of Onset    Snoring Mother     Hypertension Mother     Diabetes Maternal Grandmother     Liver Disease No family hx of     Colon Cancer No family hx of     Melanoma No family hx of     Skin Cancer No family hx of        Medications:  Current Outpatient Medications   Medication Sig Dispense Refill    azelaic acid (FINACIA) 15 % external gel Apply topically daily. 50 g 3    clindamycin (CLEOCIN-T) 1 % external gel Apply topically 2 times daily 100 mL 1    fluticasone (FLOVENT HFA) 110 MCG/ACT inhaler INHALE 2 PUFFS TWICE A DAY FOR TWO WEEKS AT ONSET OF URI. RINSE AFTER USE. 12 g 1    ISOtretinoin (ABSORICA) 30 MG capsule Take 1 capsule (30 mg) by mouth 2 times daily. 60 capsule 0    ISOtretinoin (ACCUTANE) 20 MG  capsule Take 20 mg daily with fatty meal 30 capsule 0    ISOtretinoin (CLARAVIS) 40 MG capsule Take 1 capsule (40 mg) by mouth daily. 30 capsule 0    ketoconazole (NIZORAL) 2 % external cream Apply to scaly areas between toes twice daily for at least 2 weeks 60 g 3    methylphenidate HCl ER, OSM, (CONCERTA) 36 MG CR tablet Take 2 tablets (72 mg) by mouth every morning. 64 tablet 0    pregabalin (LYRICA) 50 MG capsule Take 1 capsule (50 mg) by mouth 2 times daily 60 capsule 0    sildenafil (REVATIO) 20 MG tablet Take 1-2 tablets (20-40 mg) by mouth daily as needed (take 1 hour before intercourse as needed.) May increase up to 100mg dose ( 5 tablets) if needed.  Use lowest effective dose. 30 tablet 3    albuterol (PROAIR HFA/PROVENTIL HFA/VENTOLIN HFA) 108 (90 Base) MCG/ACT inhaler Inhale 1-2 puffs into the lungs every 6 hours as needed for shortness of breath or wheezing. (Patient not taking: Reported on 6/17/2025) 18 g 0    guanFACINE (INTUNIV) 2 MG TB24 24 hr tablet TAKE 1 TABLET BY MOUTH AT BEDTIME (Patient not taking: Reported on 6/17/2025) 90 tablet 1    tretinoin (RETIN-A) 0.025 % external cream USE EVERY NIGHT (Patient not taking: Reported on 6/17/2025) 45 g 3     Allergies   Allergen Reactions    Seasonal Allergies

## 2025-06-17 NOTE — NURSING NOTE
Dermatology Rooming Note    Miguel Angel Hernandez's goals for this visit include:   Chief Complaint   Patient presents with    Derm Problem     Hasn't been taking accutane since last month     VICENTE Hawley

## 2025-06-22 ENCOUNTER — MYC REFILL (OUTPATIENT)
Dept: INTERNAL MEDICINE | Facility: CLINIC | Age: 30
End: 2025-06-22
Payer: COMMERCIAL

## 2025-06-22 DIAGNOSIS — F90.9 ATTENTION DEFICIT HYPERACTIVITY DISORDER (ADHD), UNSPECIFIED ADHD TYPE: ICD-10-CM

## 2025-06-23 RX ORDER — METHYLPHENIDATE HYDROCHLORIDE 36 MG/1
72 TABLET ORAL EVERY MORNING
Qty: 64 TABLET | Refills: 0 | Status: SHIPPED | OUTPATIENT
Start: 2025-06-28

## 2025-06-23 NOTE — TELEPHONE ENCOUNTER
Controlled substance refill request notes    Refill request received for: Methylphenidate Er 36 Mg Tab  Last Rx: Methylphenidate Er 36 Mg Tab, qty 64, 30 day supply  [place]  data reviewed 06/23/25:  Medication last refill: qty 60, 30 day supply, filled/sold to patient on 05/29/2025  Pended order: Methylphenidate Er 36 Mg Tab, qty 64, 30 day supply, fill on or after 06/28/2025     Primary care provider: Layo Gage  Last office visit with this department: 3/12/2025  Last virtual visit with this department: 4/4/2025  Next appointment with PCP: none    Refill request forwarded to provider for review.     Tammy HOU LPN  [facility]

## 2025-06-26 ENCOUNTER — MYC MEDICAL ADVICE (OUTPATIENT)
Dept: INTERNAL MEDICINE | Facility: CLINIC | Age: 30
End: 2025-06-26
Payer: COMMERCIAL

## 2025-06-26 DIAGNOSIS — F90.9 ATTENTION DEFICIT HYPERACTIVITY DISORDER (ADHD), UNSPECIFIED ADHD TYPE: Primary | ICD-10-CM

## 2025-06-26 RX ORDER — METHYLPHENIDATE HYDROCHLORIDE 36 MG/1
72 TABLET ORAL EVERY MORNING
Qty: 60 TABLET | Refills: 0 | Status: SHIPPED | OUTPATIENT
Start: 2025-08-20

## 2025-06-26 RX ORDER — METHYLPHENIDATE HYDROCHLORIDE 36 MG/1
72 TABLET ORAL EVERY MORNING
Qty: 60 TABLET | Refills: 0 | Status: SHIPPED | OUTPATIENT
Start: 2025-07-23

## 2025-06-26 NOTE — TELEPHONE ENCOUNTER
Called patient- he is frustrated that even though his insurance allows him to fill q 28 days he has to fill every 30. He is hoping to fill today.     He is requesting that we:    Send in 3 rx's at a time, for 60 tabs, to be filled every 27-28 days.     I will talk with Dr. Gage.    Lenard Meeks RN on 6/26/2025 at 1:11 PM

## 2025-07-14 ENCOUNTER — TELEPHONE (OUTPATIENT)
Dept: INTERNAL MEDICINE | Facility: CLINIC | Age: 30
End: 2025-07-14
Payer: COMMERCIAL

## 2025-07-14 DIAGNOSIS — R63.5 WEIGHT GAIN: Primary | ICD-10-CM

## 2025-07-14 NOTE — TELEPHONE ENCOUNTER
[facility] Call Center    Phone Message    May a detailed message be left on voicemail: yes     Reason for Call: Other: Per pt is wondering if he will be getting a referral for weight management clinic? Per pt thought he would already got one sent out from his last visit. Please call pt back to discuss.      Action Taken: Message routed to:  [facility] PCC    Travel Screening: Not Applicable     Date of Service:

## 2025-07-15 ENCOUNTER — PATIENT OUTREACH (OUTPATIENT)
Dept: CARE COORDINATION | Facility: CLINIC | Age: 30
End: 2025-07-15
Payer: COMMERCIAL

## 2025-07-17 ENCOUNTER — PATIENT OUTREACH (OUTPATIENT)
Dept: CARE COORDINATION | Facility: CLINIC | Age: 30
End: 2025-07-17
Payer: COMMERCIAL

## 2025-07-18 ENCOUNTER — TELEPHONE (OUTPATIENT)
Dept: DERMATOLOGY | Facility: CLINIC | Age: 30
End: 2025-07-18
Payer: COMMERCIAL

## 2025-07-18 NOTE — TELEPHONE ENCOUNTER
[facility] Call Center    Phone Message    May a detailed message be left on voicemail: yes     Reason for Call: Appointment Intake    Referring Provider Name: NA  Diagnosis and/or Symptoms: Accutane follow-up   Pt was told he would get a call. Last Appt was 06/17/25. Pt would like to set up several Appts. Thanks     Action Taken: Message routed to:  [facility] Derm    Travel Screening: Not Applicable     Date of Service:

## 2025-08-19 ENCOUNTER — VIRTUAL VISIT (OUTPATIENT)
Dept: DERMATOLOGY | Facility: CLINIC | Age: 30
End: 2025-08-19
Payer: COMMERCIAL

## 2025-08-19 DIAGNOSIS — L70.0 ACNE VULGARIS: ICD-10-CM

## 2025-08-19 DIAGNOSIS — Z51.81 THERAPEUTIC DRUG MONITORING: ICD-10-CM

## 2025-08-19 DIAGNOSIS — L73.2 HIDRADENITIS SUPPURATIVA: ICD-10-CM

## 2025-08-19 DIAGNOSIS — Z11.3 SCREENING EXAMINATION FOR STD (SEXUALLY TRANSMITTED DISEASE): Primary | ICD-10-CM

## 2025-08-19 PROCEDURE — G2211 COMPLEX E/M VISIT ADD ON: HCPCS | Performed by: STUDENT IN AN ORGANIZED HEALTH CARE EDUCATION/TRAINING PROGRAM

## 2025-08-19 PROCEDURE — 1126F AMNT PAIN NOTED NONE PRSNT: CPT | Performed by: STUDENT IN AN ORGANIZED HEALTH CARE EDUCATION/TRAINING PROGRAM

## 2025-08-19 PROCEDURE — 98014 SYNCH AUDIO-ONLY EST MOD 30: CPT | Performed by: STUDENT IN AN ORGANIZED HEALTH CARE EDUCATION/TRAINING PROGRAM

## 2025-08-19 RX ORDER — OMEGA-3S/DHA/EPA/FISH OIL/D3 300MG-1000
1 CAPSULE ORAL 2 TIMES DAILY
Qty: 60 CAPSULE | Refills: 3 | Status: SHIPPED | OUTPATIENT
Start: 2025-08-19

## 2025-08-19 RX ORDER — ISOTRETINOIN 40 MG/1
40 CAPSULE ORAL DAILY
Qty: 30 CAPSULE | Refills: 0 | Status: SHIPPED | OUTPATIENT
Start: 2025-08-19

## 2025-08-19 ASSESSMENT — PAIN SCALES - GENERAL: PAINLEVEL_OUTOF10: NO PAIN (0)

## 2025-08-23 DIAGNOSIS — F90.9 ATTENTION DEFICIT HYPERACTIVITY DISORDER (ADHD), UNSPECIFIED ADHD TYPE: ICD-10-CM

## 2025-08-26 RX ORDER — GUANFACINE 2 MG/1
2 TABLET, EXTENDED RELEASE ORAL AT BEDTIME
Qty: 90 TABLET | Refills: 2 | Status: SHIPPED | OUTPATIENT
Start: 2025-08-26

## 2025-08-27 ENCOUNTER — LAB (OUTPATIENT)
Dept: LAB | Facility: CLINIC | Age: 30
End: 2025-08-27
Payer: COMMERCIAL

## 2025-08-27 DIAGNOSIS — Z51.81 THERAPEUTIC DRUG MONITORING: ICD-10-CM

## 2025-08-27 DIAGNOSIS — Z11.3 SCREENING EXAMINATION FOR STD (SEXUALLY TRANSMITTED DISEASE): ICD-10-CM

## 2025-08-27 LAB
ALBUMIN SERPL BCG-MCNC: 4.3 G/DL (ref 3.5–5.2)
ALP SERPL-CCNC: 87 U/L (ref 40–150)
ALT SERPL W P-5'-P-CCNC: 111 U/L (ref 0–70)
ANION GAP SERPL CALCULATED.3IONS-SCNC: 11 MMOL/L (ref 7–15)
AST SERPL W P-5'-P-CCNC: 58 U/L (ref 0–45)
BILIRUB SERPL-MCNC: 0.2 MG/DL
BUN SERPL-MCNC: 19.9 MG/DL (ref 6–20)
C TRACH DNA SPEC QL PROBE+SIG AMP: NEGATIVE
CALCIUM SERPL-MCNC: 9.3 MG/DL (ref 8.8–10.4)
CHLORIDE SERPL-SCNC: 108 MMOL/L (ref 98–107)
CHOLEST SERPL-MCNC: 186 MG/DL
CREAT SERPL-MCNC: 1.07 MG/DL (ref 0.67–1.17)
EGFRCR SERPLBLD CKD-EPI 2021: >90 ML/MIN/1.73M2
FASTING STATUS PATIENT QL REPORTED: YES
FASTING STATUS PATIENT QL REPORTED: YES
GLUCOSE SERPL-MCNC: 148 MG/DL (ref 70–99)
HCO3 SERPL-SCNC: 23 MMOL/L (ref 22–29)
HDLC SERPL-MCNC: 41 MG/DL
HIV 1+2 AB+HIV1 P24 AG SERPL QL IA: NONREACTIVE
LDLC SERPL CALC-MCNC: 110 MG/DL
N GONORRHOEA DNA SPEC QL NAA+PROBE: NEGATIVE
NONHDLC SERPL-MCNC: 145 MG/DL
POTASSIUM SERPL-SCNC: 4 MMOL/L (ref 3.4–5.3)
PROT SERPL-MCNC: 6.4 G/DL (ref 6.4–8.3)
SODIUM SERPL-SCNC: 142 MMOL/L (ref 135–145)
SPECIMEN TYPE: NORMAL
T PALLIDUM AB SER QL: NONREACTIVE
TRIGL SERPL-MCNC: 177 MG/DL

## 2025-08-27 PROCEDURE — 80061 LIPID PANEL: CPT

## 2025-08-27 PROCEDURE — 36415 COLL VENOUS BLD VENIPUNCTURE: CPT

## 2025-08-27 PROCEDURE — 86780 TREPONEMA PALLIDUM: CPT

## 2025-08-27 PROCEDURE — 80053 COMPREHEN METABOLIC PANEL: CPT

## 2025-08-27 PROCEDURE — 87591 N.GONORRHOEAE DNA AMP PROB: CPT

## 2025-08-27 PROCEDURE — 87389 HIV-1 AG W/HIV-1&-2 AB AG IA: CPT

## 2025-08-27 PROCEDURE — 87491 CHLMYD TRACH DNA AMP PROBE: CPT

## (undated) RX ORDER — ALBUTEROL SULFATE 0.83 MG/ML
SOLUTION RESPIRATORY (INHALATION)
Status: DISPENSED
Start: 2024-11-06